# Patient Record
Sex: FEMALE | Race: WHITE | Employment: UNEMPLOYED
[De-identification: names, ages, dates, MRNs, and addresses within clinical notes are randomized per-mention and may not be internally consistent; named-entity substitution may affect disease eponyms.]

---

## 2017-01-20 RX ORDER — FLUCONAZOLE 100 MG/1
100 TABLET ORAL DAILY
Qty: 7 TABLET | Refills: 0 | Status: SHIPPED | OUTPATIENT
Start: 2017-01-20 | End: 2017-01-27

## 2017-01-20 RX ORDER — ESTRADIOL 1 MG/1
TABLET ORAL
Qty: 28 TABLET | Refills: 5 | Status: SHIPPED | OUTPATIENT
Start: 2017-01-20 | End: 2017-06-28 | Stop reason: SDUPTHER

## 2017-01-20 RX ORDER — METRONIDAZOLE 500 MG/1
500 TABLET ORAL 2 TIMES DAILY
Qty: 14 TABLET | Refills: 0 | Status: SHIPPED | OUTPATIENT
Start: 2017-01-20 | End: 2017-01-27

## 2017-02-02 ENCOUNTER — TELEPHONE (OUTPATIENT)
Dept: OBGYN | Facility: CLINIC | Age: 52
End: 2017-02-02

## 2017-02-15 RX ORDER — FLUCONAZOLE 100 MG/1
100 TABLET ORAL DAILY
Qty: 7 TABLET | Refills: 0 | Status: SHIPPED | OUTPATIENT
Start: 2017-02-15 | End: 2017-02-22

## 2017-02-15 RX ORDER — VALACYCLOVIR HYDROCHLORIDE 500 MG/1
500 TABLET, FILM COATED ORAL DAILY
Qty: 30 TABLET | Refills: 0 | Status: SHIPPED | OUTPATIENT
Start: 2017-02-15 | End: 2017-05-19 | Stop reason: SDUPTHER

## 2017-02-15 RX ORDER — METRONIDAZOLE 500 MG/1
500 TABLET ORAL 3 TIMES DAILY
Qty: 30 TABLET | Refills: 0 | Status: SHIPPED | OUTPATIENT
Start: 2017-02-15 | End: 2017-02-25

## 2017-05-05 RX ORDER — FLUCONAZOLE 100 MG/1
100 TABLET ORAL DAILY
Qty: 7 TABLET | Refills: 0 | Status: SHIPPED | OUTPATIENT
Start: 2017-05-05 | End: 2017-05-12

## 2017-05-05 RX ORDER — METRONIDAZOLE 500 MG/1
500 TABLET ORAL 2 TIMES DAILY
Qty: 14 TABLET | Refills: 0 | Status: SHIPPED | OUTPATIENT
Start: 2017-05-05 | End: 2017-05-12

## 2017-05-19 RX ORDER — FLUCONAZOLE 100 MG/1
100 TABLET ORAL DAILY
Qty: 7 TABLET | Refills: 0 | Status: SHIPPED | OUTPATIENT
Start: 2017-05-19 | End: 2017-05-26

## 2017-05-19 RX ORDER — VALACYCLOVIR HYDROCHLORIDE 500 MG/1
500 TABLET, FILM COATED ORAL DAILY
Qty: 30 TABLET | Refills: 0 | Status: SHIPPED | OUTPATIENT
Start: 2017-05-19 | End: 2017-08-24 | Stop reason: SDUPTHER

## 2017-06-07 ENCOUNTER — TELEPHONE (OUTPATIENT)
Dept: OBGYN CLINIC | Age: 52
End: 2017-06-07

## 2017-06-08 RX ORDER — FLUCONAZOLE 100 MG/1
100 TABLET ORAL DAILY
Qty: 7 TABLET | Refills: 0 | Status: SHIPPED | OUTPATIENT
Start: 2017-06-08 | End: 2017-06-15

## 2017-06-08 RX ORDER — METRONIDAZOLE 500 MG/1
500 TABLET ORAL 2 TIMES DAILY
Qty: 14 TABLET | Refills: 0 | Status: SHIPPED | OUTPATIENT
Start: 2017-06-08 | End: 2017-06-15

## 2017-06-09 ENCOUNTER — HOSPITAL ENCOUNTER (OUTPATIENT)
Age: 52
Setting detail: SPECIMEN
Discharge: HOME | End: 2017-06-09
Payer: COMMERCIAL

## 2017-06-09 ENCOUNTER — OFFICE VISIT (OUTPATIENT)
Dept: OBGYN CLINIC | Age: 52
End: 2017-06-09
Payer: COMMERCIAL

## 2017-06-09 VITALS
HEART RATE: 68 BPM | HEIGHT: 62 IN | SYSTOLIC BLOOD PRESSURE: 121 MMHG | RESPIRATION RATE: 18 BRPM | DIASTOLIC BLOOD PRESSURE: 73 MMHG | WEIGHT: 103 LBS | BODY MASS INDEX: 18.95 KG/M2

## 2017-06-09 DIAGNOSIS — N76.0 ACUTE VAGINITIS: ICD-10-CM

## 2017-06-09 DIAGNOSIS — Z12.31 VISIT FOR SCREENING MAMMOGRAM: ICD-10-CM

## 2017-06-09 DIAGNOSIS — Z01.419 WELL WOMAN EXAM: Primary | ICD-10-CM

## 2017-06-09 DIAGNOSIS — R35.0 FREQUENCY OF URINATION: ICD-10-CM

## 2017-06-09 DIAGNOSIS — Z12.4 PAP SMEAR FOR CERVICAL CANCER SCREENING: ICD-10-CM

## 2017-06-09 LAB
BILIRUBIN, POC: NEGATIVE
BLOOD URINE, POC: NEGATIVE
CLARITY, POC: CLEAR
COLOR, POC: ABNORMAL
GLUCOSE URINE, POC: NEGATIVE
KETONES, POC: NEGATIVE
LEUKOCYTE EST, POC: ABNORMAL
NITRITE, POC: NEGATIVE
PH, POC: 7
PROTEIN, POC: NEGATIVE
SPECIFIC GRAVITY, POC: 1
UROBILINOGEN, POC: 1

## 2017-06-09 PROCEDURE — G0101 CA SCREEN;PELVIC/BREAST EXAM: HCPCS | Performed by: SPECIALIST

## 2017-06-09 PROCEDURE — 81002 URINALYSIS NONAUTO W/O SCOPE: CPT | Performed by: SPECIALIST

## 2017-06-09 RX ORDER — PAROXETINE 30 MG/1
TABLET, FILM COATED ORAL
Refills: 0 | COMMUNITY
Start: 2017-05-18 | End: 2017-09-07

## 2017-06-09 RX ORDER — METRONIDAZOLE 500 MG/1
500 TABLET ORAL 2 TIMES DAILY
Qty: 14 TABLET | Refills: 0 | Status: SHIPPED | OUTPATIENT
Start: 2017-06-09 | End: 2017-06-16

## 2017-06-09 RX ORDER — FLUCONAZOLE 100 MG/1
100 TABLET ORAL DAILY
Qty: 7 TABLET | Refills: 0 | Status: SHIPPED | OUTPATIENT
Start: 2017-06-09 | End: 2017-06-16

## 2017-06-09 ASSESSMENT — ENCOUNTER SYMPTOMS
NAUSEA: 0
CONSTIPATION: 0
ABDOMINAL PAIN: 0
APNEA: 0
COUGH: 0
ABDOMINAL DISTENTION: 0
EYE PAIN: 0
VOMITING: 0
DIARRHEA: 0

## 2017-06-13 LAB — CYTOLOGY REPORT: NORMAL

## 2017-06-28 RX ORDER — ESTRADIOL 1 MG/1
TABLET ORAL
Qty: 28 TABLET | Refills: 5 | Status: SHIPPED | OUTPATIENT
Start: 2017-06-28 | End: 2017-12-13 | Stop reason: SDUPTHER

## 2017-07-11 ENCOUNTER — TELEPHONE (OUTPATIENT)
Dept: OBGYN CLINIC | Age: 52
End: 2017-07-11

## 2017-07-11 RX ORDER — METRONIDAZOLE 500 MG/1
500 TABLET ORAL 2 TIMES DAILY
Qty: 14 TABLET | Refills: 0 | Status: SHIPPED | OUTPATIENT
Start: 2017-07-11 | End: 2017-07-18

## 2017-07-11 RX ORDER — FLUCONAZOLE 100 MG/1
100 TABLET ORAL DAILY
Qty: 7 TABLET | Refills: 0 | Status: SHIPPED | OUTPATIENT
Start: 2017-07-11 | End: 2017-07-18

## 2017-08-14 RX ORDER — FLUCONAZOLE 100 MG/1
100 TABLET ORAL DAILY
Qty: 7 TABLET | Refills: 0 | Status: SHIPPED | OUTPATIENT
Start: 2017-08-14 | End: 2017-08-21

## 2017-08-14 RX ORDER — METRONIDAZOLE 500 MG/1
500 TABLET ORAL 2 TIMES DAILY
Qty: 14 TABLET | Refills: 0 | Status: SHIPPED | OUTPATIENT
Start: 2017-08-14 | End: 2017-08-21

## 2017-08-24 RX ORDER — VALACYCLOVIR HYDROCHLORIDE 500 MG/1
500 TABLET, FILM COATED ORAL DAILY
Qty: 30 TABLET | Refills: 0 | Status: SHIPPED | OUTPATIENT
Start: 2017-08-24 | End: 2017-09-07 | Stop reason: ALTCHOICE

## 2017-09-01 ENCOUNTER — APPOINTMENT (OUTPATIENT)
Dept: GENERAL RADIOLOGY | Age: 52
End: 2017-09-01
Payer: COMMERCIAL

## 2017-09-01 ENCOUNTER — HOSPITAL ENCOUNTER (EMERGENCY)
Age: 52
Discharge: HOME | End: 2017-09-01
Attending: EMERGENCY MEDICINE
Payer: COMMERCIAL

## 2017-09-01 VITALS
DIASTOLIC BLOOD PRESSURE: 86 MMHG | HEIGHT: 62 IN | OXYGEN SATURATION: 97 % | TEMPERATURE: 97.7 F | WEIGHT: 102 LBS | HEART RATE: 80 BPM | SYSTOLIC BLOOD PRESSURE: 124 MMHG | BODY MASS INDEX: 18.77 KG/M2 | RESPIRATION RATE: 16 BRPM

## 2017-09-01 DIAGNOSIS — M54.41 ACUTE RIGHT-SIDED LOW BACK PAIN WITH RIGHT-SIDED SCIATICA: Primary | ICD-10-CM

## 2017-09-01 LAB
-: ABNORMAL
AMORPHOUS: ABNORMAL
BACTERIA: ABNORMAL
BILIRUBIN URINE: NEGATIVE
CASTS UA: ABNORMAL /LPF
COLOR: YELLOW
COMMENT UA: ABNORMAL
CRYSTALS, UA: ABNORMAL /HPF
EPITHELIAL CELLS UA: ABNORMAL /HPF
GLUCOSE URINE: NEGATIVE
KETONES, URINE: NEGATIVE
LEUKOCYTE ESTERASE, URINE: NEGATIVE
MUCUS: ABNORMAL
NITRITE, URINE: NEGATIVE
OTHER OBSERVATIONS UA: ABNORMAL
PH UA: 8.5 (ref 5–8)
PROTEIN UA: NEGATIVE
RBC UA: ABNORMAL /HPF
RENAL EPITHELIAL, UA: ABNORMAL /HPF
SPECIFIC GRAVITY UA: 1.01 (ref 1–1.03)
TRICHOMONAS: ABNORMAL
TURBIDITY: ABNORMAL
URINE HGB: NEGATIVE
UROBILINOGEN, URINE: NORMAL
WBC UA: ABNORMAL /HPF
YEAST: ABNORMAL

## 2017-09-01 PROCEDURE — 72100 X-RAY EXAM L-S SPINE 2/3 VWS: CPT

## 2017-09-01 PROCEDURE — 81001 URINALYSIS AUTO W/SCOPE: CPT

## 2017-09-01 PROCEDURE — 99284 EMERGENCY DEPT VISIT MOD MDM: CPT

## 2017-09-01 PROCEDURE — 6370000000 HC RX 637 (ALT 250 FOR IP): Performed by: NURSE PRACTITIONER

## 2017-09-01 RX ORDER — OXYCODONE HYDROCHLORIDE AND ACETAMINOPHEN 5; 325 MG/1; MG/1
1 TABLET ORAL ONCE
Status: DISCONTINUED | OUTPATIENT
Start: 2017-09-01 | End: 2017-09-01

## 2017-09-01 RX ORDER — CYCLOBENZAPRINE HCL 10 MG
10 TABLET ORAL ONCE
Status: COMPLETED | OUTPATIENT
Start: 2017-09-01 | End: 2017-09-01

## 2017-09-01 RX ORDER — ACETAMINOPHEN AND CODEINE PHOSPHATE 300; 30 MG/1; MG/1
1 TABLET ORAL ONCE
Status: COMPLETED | OUTPATIENT
Start: 2017-09-01 | End: 2017-09-01

## 2017-09-01 RX ORDER — CYCLOBENZAPRINE HCL 10 MG
10 TABLET ORAL 3 TIMES DAILY PRN
Qty: 15 TABLET | Refills: 0 | Status: SHIPPED | OUTPATIENT
Start: 2017-09-01 | End: 2019-05-31 | Stop reason: ALTCHOICE

## 2017-09-01 RX ADMIN — CYCLOBENZAPRINE HYDROCHLORIDE 10 MG: 10 TABLET, FILM COATED ORAL at 15:12

## 2017-09-01 RX ADMIN — ACETAMINOPHEN AND CODEINE PHOSPHATE 1 TABLET: 300; 30 TABLET ORAL at 15:11

## 2017-09-01 ASSESSMENT — ENCOUNTER SYMPTOMS
SHORTNESS OF BREATH: 0
COUGH: 0
BOWEL INCONTINENCE: 0
VOMITING: 0
NAUSEA: 0
BACK PAIN: 1
ABDOMINAL PAIN: 0
TROUBLE SWALLOWING: 0

## 2017-09-01 ASSESSMENT — PAIN DESCRIPTION - LOCATION: LOCATION: BACK

## 2017-09-01 ASSESSMENT — PAIN DESCRIPTION - PAIN TYPE: TYPE: ACUTE PAIN

## 2017-09-01 ASSESSMENT — PAIN SCALES - GENERAL: PAINLEVEL_OUTOF10: 10

## 2017-09-07 ENCOUNTER — APPOINTMENT (OUTPATIENT)
Dept: GENERAL RADIOLOGY | Age: 52
End: 2017-09-07
Payer: COMMERCIAL

## 2017-09-07 ENCOUNTER — HOSPITAL ENCOUNTER (EMERGENCY)
Age: 52
Discharge: ANOTHER HOSPITAL | End: 2017-09-07
Attending: EMERGENCY MEDICINE
Payer: COMMERCIAL

## 2017-09-07 ENCOUNTER — APPOINTMENT (OUTPATIENT)
Dept: CT IMAGING | Age: 52
End: 2017-09-07
Payer: COMMERCIAL

## 2017-09-07 VITALS
TEMPERATURE: 98 F | HEIGHT: 62 IN | BODY MASS INDEX: 18.95 KG/M2 | RESPIRATION RATE: 16 BRPM | HEART RATE: 77 BPM | WEIGHT: 103 LBS | OXYGEN SATURATION: 99 % | DIASTOLIC BLOOD PRESSURE: 67 MMHG | SYSTOLIC BLOOD PRESSURE: 97 MMHG

## 2017-09-07 DIAGNOSIS — R29.898 BILATERAL LEG WEAKNESS: Primary | ICD-10-CM

## 2017-09-07 DIAGNOSIS — R47.1 DYSARTHRIA: ICD-10-CM

## 2017-09-07 LAB
% CKMB: 1.2 % (ref 0–3)
ABSOLUTE EOS #: 0.4 K/UL (ref 0–0.4)
ABSOLUTE LYMPH #: 1 K/UL (ref 1–4.8)
ABSOLUTE MONO #: 0.2 K/UL (ref 0.1–1.3)
AMMONIA: 35 UMOL/L (ref 11–51)
AMPHETAMINE SCREEN URINE: NEGATIVE
ANION GAP SERPL CALCULATED.3IONS-SCNC: 9 MMOL/L (ref 9–17)
BARBITURATE SCREEN URINE: NEGATIVE
BASOPHILS # BLD: 0 %
BASOPHILS ABSOLUTE: 0 K/UL (ref 0–0.2)
BENZODIAZEPINE SCREEN, URINE: NEGATIVE
BILIRUBIN URINE: NEGATIVE
BUN BLDV-MCNC: 11 MG/DL (ref 6–20)
BUN/CREAT BLD: ABNORMAL (ref 9–20)
BUPRENORPHINE URINE: ABNORMAL
CALCIUM SERPL-MCNC: 8.6 MG/DL (ref 8.6–10.4)
CANNABINOID SCREEN URINE: NEGATIVE
CHLORIDE BLD-SCNC: 108 MMOL/L (ref 98–107)
CK MB: 1.3 NG/ML
CKMB INTERPRETATION: ABNORMAL
CO2: 26 MMOL/L (ref 20–31)
COCAINE METABOLITE, URINE: NEGATIVE
COLOR: YELLOW
COMMENT UA: NORMAL
CREAT SERPL-MCNC: 0.69 MG/DL (ref 0.5–0.9)
DIFFERENTIAL TYPE: ABNORMAL
EOSINOPHILS RELATIVE PERCENT: 8 %
GFR AFRICAN AMERICAN: >60 ML/MIN
GFR NON-AFRICAN AMERICAN: >60 ML/MIN
GFR SERPL CREATININE-BSD FRML MDRD: ABNORMAL ML/MIN/{1.73_M2}
GFR SERPL CREATININE-BSD FRML MDRD: ABNORMAL ML/MIN/{1.73_M2}
GLUCOSE BLD-MCNC: 91 MG/DL (ref 70–99)
GLUCOSE URINE: NEGATIVE
HCT VFR BLD CALC: 32.5 % (ref 36–46)
HEMOGLOBIN: 10.8 G/DL (ref 12–16)
INR BLD: 1
KETONES, URINE: NEGATIVE
LAMOTRIGINE LEVEL: 10.5 UG/ML (ref 3–15)
LEUKOCYTE ESTERASE, URINE: NEGATIVE
LYMPHOCYTES # BLD: 21 %
MCH RBC QN AUTO: 34.9 PG (ref 26–34)
MCHC RBC AUTO-ENTMCNC: 33.3 G/DL (ref 31–37)
MCV RBC AUTO: 104.7 FL (ref 80–100)
MDMA URINE: ABNORMAL
METHADONE SCREEN, URINE: NEGATIVE
METHAMPHETAMINE, URINE: ABNORMAL
MONOCYTES # BLD: 4 %
MYOGLOBIN: 34 NG/ML (ref 25–58)
NITRITE, URINE: NEGATIVE
OPIATES, URINE: POSITIVE
OXYCODONE SCREEN URINE: POSITIVE
PARTIAL THROMBOPLASTIN TIME: 28.5 SEC (ref 23–31)
PDW BLD-RTO: 14 % (ref 11.5–14.9)
PH UA: 7.5 (ref 5–8)
PHENCYCLIDINE, URINE: NEGATIVE
PLATELET # BLD: 155 K/UL (ref 150–450)
PLATELET ESTIMATE: ABNORMAL
PMV BLD AUTO: 8.2 FL (ref 6–12)
POTASSIUM SERPL-SCNC: 4 MMOL/L (ref 3.7–5.3)
PROPOXYPHENE, URINE: ABNORMAL
PROTEIN UA: NEGATIVE
PROTHROMBIN TIME: 10.3 SEC (ref 9.7–12)
RBC # BLD: 3.1 M/UL (ref 4–5.2)
RBC # BLD: ABNORMAL 10*6/UL
SEG NEUTROPHILS: 67 %
SEGMENTED NEUTROPHILS ABSOLUTE COUNT: 3.2 K/UL (ref 1.3–9.1)
SODIUM BLD-SCNC: 143 MMOL/L (ref 135–144)
SPECIFIC GRAVITY UA: 1.01 (ref 1–1.03)
TEST INFORMATION: ABNORMAL
TOTAL CK: 107 U/L (ref 26–192)
TRICYCLIC ANTIDEPRESSANTS, UR: ABNORMAL
TROPONIN INTERP: ABNORMAL
TROPONIN T: <0.03 NG/ML
TURBIDITY: CLEAR
URINE HGB: NEGATIVE
UROBILINOGEN, URINE: NORMAL
WBC # BLD: 4.8 K/UL (ref 3.5–11)
WBC # BLD: ABNORMAL 10*3/UL

## 2017-09-07 PROCEDURE — 80201 ASSAY OF TOPIRAMATE: CPT

## 2017-09-07 PROCEDURE — 70450 CT HEAD/BRAIN W/O DYE: CPT

## 2017-09-07 PROCEDURE — 84484 ASSAY OF TROPONIN QUANT: CPT

## 2017-09-07 PROCEDURE — 85025 COMPLETE CBC W/AUTO DIFF WBC: CPT

## 2017-09-07 PROCEDURE — 83874 ASSAY OF MYOGLOBIN: CPT

## 2017-09-07 PROCEDURE — 82550 ASSAY OF CK (CPK): CPT

## 2017-09-07 PROCEDURE — 80175 DRUG SCREEN QUAN LAMOTRIGINE: CPT

## 2017-09-07 PROCEDURE — 85730 THROMBOPLASTIN TIME PARTIAL: CPT

## 2017-09-07 PROCEDURE — 80048 BASIC METABOLIC PNL TOTAL CA: CPT

## 2017-09-07 PROCEDURE — 82553 CREATINE MB FRACTION: CPT

## 2017-09-07 PROCEDURE — 93005 ELECTROCARDIOGRAM TRACING: CPT

## 2017-09-07 PROCEDURE — 36415 COLL VENOUS BLD VENIPUNCTURE: CPT

## 2017-09-07 PROCEDURE — 81003 URINALYSIS AUTO W/O SCOPE: CPT

## 2017-09-07 PROCEDURE — 80307 DRUG TEST PRSMV CHEM ANLYZR: CPT

## 2017-09-07 PROCEDURE — 85610 PROTHROMBIN TIME: CPT

## 2017-09-07 PROCEDURE — 71020 XR CHEST STANDARD TWO VW: CPT

## 2017-09-07 PROCEDURE — 99285 EMERGENCY DEPT VISIT HI MDM: CPT

## 2017-09-07 PROCEDURE — 82140 ASSAY OF AMMONIA: CPT

## 2017-09-07 ASSESSMENT — PAIN DESCRIPTION - PAIN TYPE
TYPE: CHRONIC PAIN
TYPE: ACUTE PAIN

## 2017-09-07 ASSESSMENT — PAIN SCALES - GENERAL
PAINLEVEL_OUTOF10: 8
PAINLEVEL_OUTOF10: 8

## 2017-09-07 ASSESSMENT — ENCOUNTER SYMPTOMS
BACK PAIN: 0
NAUSEA: 0
DIARRHEA: 0
ABDOMINAL PAIN: 0
SHORTNESS OF BREATH: 0
EYE PAIN: 0
VOMITING: 0
SORE THROAT: 0
COUGH: 0

## 2017-09-07 ASSESSMENT — PAIN DESCRIPTION - LOCATION: LOCATION: HEAD

## 2017-09-09 LAB — TOPIRAMATE LEVEL: 4.3 UG/ML (ref 5–20)

## 2017-09-13 LAB
EKG ATRIAL RATE: 83 BPM
EKG P AXIS: 59 DEGREES
EKG P-R INTERVAL: 170 MS
EKG Q-T INTERVAL: 362 MS
EKG QRS DURATION: 94 MS
EKG QTC CALCULATION (BAZETT): 425 MS
EKG R AXIS: -23 DEGREES
EKG T AXIS: 27 DEGREES
EKG VENTRICULAR RATE: 83 BPM

## 2017-09-28 ENCOUNTER — TELEPHONE (OUTPATIENT)
Dept: OBGYN CLINIC | Age: 52
End: 2017-09-28

## 2017-09-28 DIAGNOSIS — N76.0 ACUTE VAGINITIS: Primary | ICD-10-CM

## 2017-09-28 RX ORDER — FLUCONAZOLE 100 MG/1
100 TABLET ORAL DAILY
Qty: 7 TABLET | Refills: 0 | Status: SHIPPED | OUTPATIENT
Start: 2017-09-28 | End: 2017-10-05

## 2017-09-28 RX ORDER — METRONIDAZOLE 500 MG/1
500 TABLET ORAL 2 TIMES DAILY
Qty: 14 TABLET | Refills: 0 | Status: SHIPPED | OUTPATIENT
Start: 2017-09-28 | End: 2017-10-05

## 2017-10-11 ENCOUNTER — OFFICE VISIT (OUTPATIENT)
Dept: OBGYN CLINIC | Age: 52
End: 2017-10-11
Payer: COMMERCIAL

## 2017-10-11 VITALS
SYSTOLIC BLOOD PRESSURE: 123 MMHG | DIASTOLIC BLOOD PRESSURE: 83 MMHG | BODY MASS INDEX: 20.57 KG/M2 | RESPIRATION RATE: 18 BRPM | HEART RATE: 88 BPM | HEIGHT: 62 IN | WEIGHT: 111.8 LBS

## 2017-10-11 DIAGNOSIS — N76.0 ACUTE VAGINITIS: Primary | ICD-10-CM

## 2017-10-11 PROCEDURE — 99213 OFFICE O/P EST LOW 20 MIN: CPT | Performed by: SPECIALIST

## 2017-10-11 RX ORDER — DIPHENHYDRAMINE HYDROCHLORIDE 25 MG/1
CAPSULE ORAL
Refills: 0 | COMMUNITY
Start: 2017-09-29

## 2017-10-11 RX ORDER — PSYLLIUM HUSK 3.4 G/7G
POWDER ORAL
Refills: 0 | COMMUNITY
Start: 2017-09-29

## 2017-10-11 RX ORDER — MEMANTINE HYDROCHLORIDE 10 MG/1
TABLET ORAL
Refills: 0 | COMMUNITY
Start: 2017-09-20 | End: 2019-05-31 | Stop reason: SINTOL

## 2017-10-11 RX ORDER — METRONIDAZOLE 500 MG/1
500 TABLET ORAL 2 TIMES DAILY
Qty: 14 TABLET | Refills: 0 | Status: SHIPPED | OUTPATIENT
Start: 2017-10-11 | End: 2017-10-18

## 2017-10-11 RX ORDER — FLUCONAZOLE 100 MG/1
100 TABLET ORAL DAILY
Qty: 7 TABLET | Refills: 0 | Status: SHIPPED | OUTPATIENT
Start: 2017-10-11 | End: 2017-10-18

## 2017-10-11 ASSESSMENT — ENCOUNTER SYMPTOMS
EYE PAIN: 0
NAUSEA: 0
APNEA: 0
COUGH: 0
ABDOMINAL PAIN: 1
DIARRHEA: 0
ABDOMINAL DISTENTION: 0
CONSTIPATION: 0
VOMITING: 0

## 2017-10-11 NOTE — PROGRESS NOTES
(90 BASE) MCG/ACT inhaler Inhale 2 puffs into the lungs every 4 hours as needed  0    ORENCIA 125 MG/ML SOSY Inject 1 Dose into the skin once a week Indications: every Tuesday every Tuesday  0    lamoTRIgine (LAMICTAL) 200 MG tablet Take 1 tablet by mouth 2 times daily   1    methotrexate (RHEUMATREX) 2.5 MG chemo tablet Take 12.5 mg by mouth once a week Indications: (Pt takes 5 tabs = 12.5 mg every Tuesday) (Pt takes 5 tabs = 12.5 mg every Tuesday)  0    gabapentin (NEURONTIN) 800 MG tablet Take 1 tablet by mouth three times daily.  pantoprazole (PROTONIX) 20 MG tablet Take 1 tablet by mouth daily.  oxyCODONE-acetaminophen (PERCOCET) 5-325 MG per tablet Take 1 tablet by mouth every 8 hours as needed for Pain       hydrochlorothiazide (HYDRODIURIL) 25 MG tablet Take 25 mg by mouth daily.  levothyroxine (SYNTHROID) 75 MCG tablet Take 1 tablet by mouth daily. On empty stomach 30 tablet 5    tizanidine (ZANAFLEX) 4 MG tablet Take 4 mg by mouth every 8 hours as needed.  folic acid (FOLVITE) 1 MG tablet Take 1 mg by mouth daily. No current Epic-ordered facility-administered medications on file. Problem List Items Addressed This Visit     Vaginitis - Primary      Other Visit Diagnoses    None. Allergies   Allergen Reactions    Latex     Aspirin     Cortisone     Dye [Iodides] Other (See Comments)     IV dye leaked into arm and caused local swelling in that area    Ibuprofen     Influenza Vaccines      On orencia, for RA    Penicillins      No orders of the defined types were placed in this encounter. Patient is here today complaining of a vaginal discharge with pain and itching. She states that it started a couplle of days ago. The vaginal pain kept her up all night. Patient denies having diabetes. She also complains of pain in her stomach that has been bothering her a lot. She denies nausea, vomiting and fever.          Review of Systems   Constitutional:

## 2017-11-20 ENCOUNTER — TELEPHONE (OUTPATIENT)
Dept: OBGYN CLINIC | Age: 52
End: 2017-11-20

## 2017-11-20 DIAGNOSIS — N76.0 ACUTE VAGINITIS: Primary | ICD-10-CM

## 2017-11-21 RX ORDER — METRONIDAZOLE 500 MG/1
500 TABLET ORAL 2 TIMES DAILY
Qty: 14 TABLET | Refills: 0 | Status: SHIPPED | OUTPATIENT
Start: 2017-11-21 | End: 2017-11-28

## 2017-11-21 RX ORDER — FLUCONAZOLE 100 MG/1
100 TABLET ORAL DAILY
Qty: 7 TABLET | Refills: 0 | Status: SHIPPED | OUTPATIENT
Start: 2017-11-21 | End: 2017-11-28

## 2017-12-06 RX ORDER — VALACYCLOVIR HYDROCHLORIDE 500 MG/1
500 TABLET, FILM COATED ORAL DAILY
Qty: 30 TABLET | Refills: 0 | Status: SHIPPED | OUTPATIENT
Start: 2017-12-06 | End: 2018-02-19 | Stop reason: SDUPTHER

## 2017-12-06 NOTE — TELEPHONE ENCOUNTER
LOV 10/11/17    Next Visit Date:  No future appointments.     Health Maintenance   Topic Date Due    Hepatitis C screen  1965    HIV screen  10/20/1980    DTaP/Tdap/Td vaccine (1 - Tdap) 10/20/1984    Lipid screen  10/20/2005    Colon cancer screen colonoscopy  04/26/2017    Breast cancer screen  01/07/2018    Cervical cancer screen  06/09/2020       No results found for: LABA1C          ( goal A1C is < 7)   No results found for: LABMICR  No results found for: LDLCHOLESTEROL, LDLCALC    (goal LDL is <100)   AST (U/L)   Date Value   09/19/2016 21     ALT (U/L)   Date Value   09/19/2016 14     BUN (mg/dL)   Date Value   09/07/2017 11     BP Readings from Last 3 Encounters:   10/11/17 123/83   09/07/17 97/67   09/01/17 124/86          (goal 120/80)    All Future Testing planned in [facility]  Lab Frequency Next Occurrence               Patient Active Problem List:     Vaginitis     Rectal bleeding     IBS (irritable bowel syndrome)     Tubulovillous adenoma     Internal hemorrhoids     Weight loss     Seizures (HCC)     Macrocytic anemia     Acquired hypothyroidism     Vertigo     Rheumatoid arthritis involving multiple sites ([facility] [address]

## 2017-12-13 RX ORDER — ESTRADIOL 1 MG/1
TABLET ORAL
Qty: 28 TABLET | Refills: 5 | Status: SHIPPED | OUTPATIENT
Start: 2017-12-13 | End: 2018-05-18 | Stop reason: SDUPTHER

## 2018-01-04 ENCOUNTER — TELEPHONE (OUTPATIENT)
Dept: OBGYN CLINIC | Age: 53
End: 2018-01-04

## 2018-01-10 RX ORDER — FLUCONAZOLE 100 MG/1
100 TABLET ORAL DAILY
Qty: 7 TABLET | Refills: 0 | Status: SHIPPED | OUTPATIENT
Start: 2018-01-10 | End: 2018-01-17

## 2018-01-10 RX ORDER — METRONIDAZOLE 500 MG/1
500 TABLET ORAL 2 TIMES DAILY
Qty: 14 TABLET | Refills: 0 | Status: SHIPPED | OUTPATIENT
Start: 2018-01-10 | End: 2018-01-17

## 2018-02-19 ENCOUNTER — OFFICE VISIT (OUTPATIENT)
Dept: OBGYN CLINIC | Age: 53
End: 2018-02-19
Payer: COMMERCIAL

## 2018-02-19 ENCOUNTER — HOSPITAL ENCOUNTER (OUTPATIENT)
Age: 53
Setting detail: SPECIMEN
Discharge: HOME | End: 2018-02-19
Payer: COMMERCIAL

## 2018-02-19 VITALS
HEART RATE: 86 BPM | BODY MASS INDEX: 21.03 KG/M2 | RESPIRATION RATE: 20 BRPM | WEIGHT: 115 LBS | SYSTOLIC BLOOD PRESSURE: 111 MMHG | DIASTOLIC BLOOD PRESSURE: 76 MMHG

## 2018-02-19 DIAGNOSIS — R30.0 DYSURIA: ICD-10-CM

## 2018-02-19 DIAGNOSIS — N89.8 VAGINAL ODOR: ICD-10-CM

## 2018-02-19 DIAGNOSIS — R35.0 URINARY FREQUENCY: Primary | ICD-10-CM

## 2018-02-19 DIAGNOSIS — A60.00 GENITAL HERPES SIMPLEX, UNSPECIFIED SITE: ICD-10-CM

## 2018-02-19 LAB
BILIRUBIN, POC: NORMAL
BLOOD URINE, POC: NORMAL
CLARITY, POC: CLEAR
COLOR, POC: NORMAL
DIRECT EXAM: NORMAL
GLUCOSE URINE, POC: NORMAL
KETONES, POC: NORMAL
LEUKOCYTE EST, POC: NORMAL
Lab: NORMAL
NITRITE, POC: NORMAL
PH, POC: NORMAL
PROTEIN, POC: NORMAL
SPECIFIC GRAVITY, POC: NORMAL
SPECIMEN DESCRIPTION: NORMAL
STATUS: NORMAL
UROBILINOGEN, POC: NORMAL

## 2018-02-19 PROCEDURE — G8484 FLU IMMUNIZE NO ADMIN: HCPCS | Performed by: CLINICAL NURSE SPECIALIST

## 2018-02-19 PROCEDURE — 1036F TOBACCO NON-USER: CPT | Performed by: CLINICAL NURSE SPECIALIST

## 2018-02-19 PROCEDURE — G8428 CUR MEDS NOT DOCUMENT: HCPCS | Performed by: CLINICAL NURSE SPECIALIST

## 2018-02-19 PROCEDURE — 81002 URINALYSIS NONAUTO W/O SCOPE: CPT | Performed by: CLINICAL NURSE SPECIALIST

## 2018-02-19 PROCEDURE — 99213 OFFICE O/P EST LOW 20 MIN: CPT | Performed by: CLINICAL NURSE SPECIALIST

## 2018-02-19 PROCEDURE — G8420 CALC BMI NORM PARAMETERS: HCPCS | Performed by: CLINICAL NURSE SPECIALIST

## 2018-02-19 PROCEDURE — 3017F COLORECTAL CA SCREEN DOC REV: CPT | Performed by: CLINICAL NURSE SPECIALIST

## 2018-02-19 PROCEDURE — 3014F SCREEN MAMMO DOC REV: CPT | Performed by: CLINICAL NURSE SPECIALIST

## 2018-02-19 RX ORDER — VALACYCLOVIR HYDROCHLORIDE 500 MG/1
500 TABLET, FILM COATED ORAL DAILY
Qty: 30 TABLET | Refills: 2 | Status: SHIPPED | OUTPATIENT
Start: 2018-02-19 | End: 2018-10-10 | Stop reason: SDUPTHER

## 2018-02-19 ASSESSMENT — ENCOUNTER SYMPTOMS
RESPIRATORY NEGATIVE: 1
EYES NEGATIVE: 1
GASTROINTESTINAL NEGATIVE: 1
ALLERGIC/IMMUNOLOGIC NEGATIVE: 1

## 2018-02-19 NOTE — PROGRESS NOTES
Subjective:      Patient ID:  Ana Bhatia is a 46 y.o. female who presents for   Chief Complaint   Patient presents with    Urinary Frequency     Patient is here today for urinary frequency and vaginal odor. She states that she thinks she has a UTI. This has been going on for awhile. HPI     Patient is a 45 yo female who presents for urinary frequency and burning , vaginal pressure and odor which has been ongoing for 1 week. Patient states \"it does not feel right down there. \"    Review of Systems   Constitutional: Negative. HENT: Negative. Eyes: Negative. Respiratory: Negative. Cardiovascular: Negative. Gastrointestinal: Negative. Endocrine: Negative. Genitourinary: Positive for dysuria (for approx. 1 week) and frequency (for approx. 1 week). Vaginal pressure and odor for approx. 1 week   Musculoskeletal: Negative. Skin: Negative. Allergic/Immunologic: Negative. Neurological: Negative. Hematological: Negative. Psychiatric/Behavioral: Negative. /76 (Site: Left Arm, Position: Sitting, Cuff Size: Small Adult)   Pulse 86   Resp 20   Wt 115 lb (52.2 kg)   LMP 08/05/2007 (Within Years)   BMI 21.03 kg/m²    Patient's last menstrual period was 08/05/2007 (within years). Objective:   Physical Exam   Constitutional: She is oriented to person, place, and time. She appears well-developed and well-nourished. HENT:   Head: Normocephalic and atraumatic. Eyes: Conjunctivae are normal.   Neck: Normal range of motion. Neck supple. Cardiovascular: Normal rate and regular rhythm. Pulmonary/Chest: Effort normal and breath sounds normal.   Abdominal: Bowel sounds are normal.   Genitourinary: Vaginal discharge (white discharge) found. Genitourinary Comments: Small spec used and patient is very painful with insertion could not open spec very much due to patient comfort   Musculoskeletal: Normal range of motion.    Neurological: She is oriented to person, place, and time. Skin: Skin is warm and dry. Psychiatric: She has a normal mood and affect. Her behavior is normal. Judgment and thought content normal.   Vitals reviewed. Assessment:     1. Urinary frequency  POCT Urinalysis Dipstick (No Micro)   2. Vaginal odor  VAGINITIS DNA PROBE   3. Genital herpes simplex, unspecified site  valACYclovir (VALTREX) 500 MG tablet   4. Dysuria   VAGINITIS DNA PROBE         Plan:      Return for as needed. Patient was seen with total face to face time of 15 minutes. More than 50% of this visit was on counseling and education regarding the problems listed below and her options. She was also counseled on her preventative health maintenance recommendations and follow-up.     Electronically signed by: Graciela Mandel CNP

## 2018-04-12 ENCOUNTER — OFFICE VISIT (OUTPATIENT)
Dept: OBGYN CLINIC | Age: 53
End: 2018-04-12
Payer: COMMERCIAL

## 2018-04-12 ENCOUNTER — HOSPITAL ENCOUNTER (OUTPATIENT)
Age: 53
Setting detail: SPECIMEN
Discharge: HOME | End: 2018-04-12
Payer: COMMERCIAL

## 2018-04-12 VITALS
HEART RATE: 77 BPM | WEIGHT: 112 LBS | RESPIRATION RATE: 16 BRPM | SYSTOLIC BLOOD PRESSURE: 109 MMHG | BODY MASS INDEX: 20.49 KG/M2 | DIASTOLIC BLOOD PRESSURE: 66 MMHG

## 2018-04-12 DIAGNOSIS — R10.2 PELVIC PAIN IN FEMALE: ICD-10-CM

## 2018-04-12 DIAGNOSIS — N89.8 VAGINAL ODOR: Primary | ICD-10-CM

## 2018-04-12 DIAGNOSIS — N89.8 VAGINA ITCHING: ICD-10-CM

## 2018-04-12 DIAGNOSIS — R35.0 URINARY FREQUENCY: ICD-10-CM

## 2018-04-12 LAB
BILIRUBIN, POC: NORMAL
BLOOD URINE, POC: NORMAL
CLARITY, POC: NORMAL
COLOR, POC: NORMAL
DIRECT EXAM: ABNORMAL
GLUCOSE URINE, POC: NORMAL
KETONES, POC: NORMAL
LEUKOCYTE EST, POC: NORMAL
Lab: ABNORMAL
NITRITE, POC: NORMAL
PH, POC: NORMAL
PROTEIN, POC: NORMAL
SPECIFIC GRAVITY, POC: NORMAL
SPECIMEN DESCRIPTION: ABNORMAL
STATUS: ABNORMAL
UROBILINOGEN, POC: NORMAL

## 2018-04-12 PROCEDURE — 3014F SCREEN MAMMO DOC REV: CPT | Performed by: CLINICAL NURSE SPECIALIST

## 2018-04-12 PROCEDURE — G8427 DOCREV CUR MEDS BY ELIG CLIN: HCPCS | Performed by: CLINICAL NURSE SPECIALIST

## 2018-04-12 PROCEDURE — 99213 OFFICE O/P EST LOW 20 MIN: CPT | Performed by: CLINICAL NURSE SPECIALIST

## 2018-04-12 PROCEDURE — 81002 URINALYSIS NONAUTO W/O SCOPE: CPT | Performed by: CLINICAL NURSE SPECIALIST

## 2018-04-12 PROCEDURE — 3017F COLORECTAL CA SCREEN DOC REV: CPT | Performed by: CLINICAL NURSE SPECIALIST

## 2018-04-12 PROCEDURE — G8420 CALC BMI NORM PARAMETERS: HCPCS | Performed by: CLINICAL NURSE SPECIALIST

## 2018-04-12 PROCEDURE — 1036F TOBACCO NON-USER: CPT | Performed by: CLINICAL NURSE SPECIALIST

## 2018-04-12 RX ORDER — NITROFURANTOIN 25; 75 MG/1; MG/1
100 CAPSULE ORAL 2 TIMES DAILY
Qty: 14 CAPSULE | Refills: 0 | Status: SHIPPED | OUTPATIENT
Start: 2018-04-12 | End: 2018-04-19

## 2018-04-12 RX ORDER — FLUCONAZOLE 150 MG/1
TABLET ORAL
Qty: 2 TABLET | Refills: 0 | Status: SHIPPED | OUTPATIENT
Start: 2018-04-12 | End: 2018-08-20 | Stop reason: SDUPTHER

## 2018-04-12 RX ORDER — NYSTATIN 100000 U/G
CREAM TOPICAL
Qty: 1 TUBE | Refills: 1 | Status: SHIPPED | OUTPATIENT
Start: 2018-04-12 | End: 2019-05-31 | Stop reason: ALTCHOICE

## 2018-04-12 ASSESSMENT — ENCOUNTER SYMPTOMS
RESPIRATORY NEGATIVE: 1
GASTROINTESTINAL NEGATIVE: 1
ALLERGIC/IMMUNOLOGIC NEGATIVE: 1
EYES NEGATIVE: 1

## 2018-05-18 RX ORDER — ESTRADIOL 1 MG/1
TABLET ORAL
Qty: 28 TABLET | Refills: 5 | Status: SHIPPED | OUTPATIENT
Start: 2018-05-18 | End: 2018-10-15 | Stop reason: SDUPTHER

## 2018-05-25 ENCOUNTER — TELEPHONE (OUTPATIENT)
Dept: OBGYN CLINIC | Age: 53
End: 2018-05-25

## 2018-06-01 ENCOUNTER — OFFICE VISIT (OUTPATIENT)
Dept: OBGYN CLINIC | Age: 53
End: 2018-06-01
Payer: COMMERCIAL

## 2018-06-01 VITALS
WEIGHT: 112.4 LBS | HEIGHT: 61 IN | HEART RATE: 78 BPM | DIASTOLIC BLOOD PRESSURE: 69 MMHG | SYSTOLIC BLOOD PRESSURE: 113 MMHG | BODY MASS INDEX: 21.22 KG/M2

## 2018-06-01 DIAGNOSIS — N95.2 ATROPHIC VAGINITIS: Primary | ICD-10-CM

## 2018-06-01 DIAGNOSIS — N76.0 ACUTE VAGINITIS: ICD-10-CM

## 2018-06-01 DIAGNOSIS — R35.0 FREQUENT URINATION: ICD-10-CM

## 2018-06-01 LAB
BILIRUBIN, POC: NEGATIVE
BLOOD URINE, POC: ABNORMAL
CLARITY, POC: CLEAR
COLOR, POC: YELLOW
GLUCOSE URINE, POC: NEGATIVE
KETONES, POC: ABNORMAL
LEUKOCYTE EST, POC: NEGATIVE
NITRITE, POC: NEGATIVE
PH, POC: 5
PROTEIN, POC: NEGATIVE
SPECIFIC GRAVITY, POC: 1.01
UROBILINOGEN, POC: NORMAL

## 2018-06-01 PROCEDURE — G8427 DOCREV CUR MEDS BY ELIG CLIN: HCPCS | Performed by: SPECIALIST

## 2018-06-01 PROCEDURE — 1036F TOBACCO NON-USER: CPT | Performed by: SPECIALIST

## 2018-06-01 PROCEDURE — 3017F COLORECTAL CA SCREEN DOC REV: CPT | Performed by: SPECIALIST

## 2018-06-01 PROCEDURE — 81002 URINALYSIS NONAUTO W/O SCOPE: CPT | Performed by: SPECIALIST

## 2018-06-01 PROCEDURE — 99213 OFFICE O/P EST LOW 20 MIN: CPT | Performed by: SPECIALIST

## 2018-06-01 PROCEDURE — G8420 CALC BMI NORM PARAMETERS: HCPCS | Performed by: SPECIALIST

## 2018-06-01 RX ORDER — VALACYCLOVIR HYDROCHLORIDE 500 MG/1
500 TABLET, FILM COATED ORAL 2 TIMES DAILY
Qty: 30 TABLET | Refills: 1 | Status: SHIPPED | OUTPATIENT
Start: 2018-06-01 | End: 2019-01-14 | Stop reason: SDUPTHER

## 2018-06-01 RX ORDER — FLUCONAZOLE 100 MG/1
100 TABLET ORAL DAILY
Qty: 7 TABLET | Refills: 0 | Status: SHIPPED | OUTPATIENT
Start: 2018-06-01 | End: 2018-06-08

## 2018-06-01 RX ORDER — ESTRADIOL 0.1 MG/G
1 CREAM VAGINAL DAILY
Qty: 1 TUBE | Refills: 3 | Status: SHIPPED | OUTPATIENT
Start: 2018-06-01 | End: 2022-06-02

## 2018-06-01 RX ORDER — METRONIDAZOLE 500 MG/1
500 TABLET ORAL 2 TIMES DAILY
Qty: 14 TABLET | Refills: 0 | Status: SHIPPED | OUTPATIENT
Start: 2018-06-01 | End: 2018-06-08

## 2018-06-01 ASSESSMENT — ENCOUNTER SYMPTOMS
VOMITING: 0
ABDOMINAL DISTENTION: 0
APNEA: 0
EYE PAIN: 0
COUGH: 0
NAUSEA: 0
ABDOMINAL PAIN: 0
DIARRHEA: 0
CONSTIPATION: 0

## 2018-07-03 ENCOUNTER — OFFICE VISIT (OUTPATIENT)
Dept: OBGYN CLINIC | Age: 53
End: 2018-07-03
Payer: COMMERCIAL

## 2018-07-03 VITALS
WEIGHT: 104.2 LBS | HEART RATE: 83 BPM | DIASTOLIC BLOOD PRESSURE: 78 MMHG | SYSTOLIC BLOOD PRESSURE: 113 MMHG | HEIGHT: 61 IN | BODY MASS INDEX: 19.67 KG/M2

## 2018-07-03 DIAGNOSIS — R35.0 FREQUENT URINATION: ICD-10-CM

## 2018-07-03 DIAGNOSIS — N76.0 ACUTE VAGINITIS: ICD-10-CM

## 2018-07-03 DIAGNOSIS — N39.0 URINARY TRACT INFECTION WITHOUT HEMATURIA, SITE UNSPECIFIED: ICD-10-CM

## 2018-07-03 DIAGNOSIS — N89.8 VAGINAL DISCHARGE: ICD-10-CM

## 2018-07-03 DIAGNOSIS — N95.2 ATROPHIC VAGINITIS: Primary | ICD-10-CM

## 2018-07-03 PROCEDURE — G8420 CALC BMI NORM PARAMETERS: HCPCS | Performed by: SPECIALIST

## 2018-07-03 PROCEDURE — 99213 OFFICE O/P EST LOW 20 MIN: CPT | Performed by: SPECIALIST

## 2018-07-03 PROCEDURE — 1036F TOBACCO NON-USER: CPT | Performed by: SPECIALIST

## 2018-07-03 PROCEDURE — G8427 DOCREV CUR MEDS BY ELIG CLIN: HCPCS | Performed by: SPECIALIST

## 2018-07-03 PROCEDURE — 3017F COLORECTAL CA SCREEN DOC REV: CPT | Performed by: SPECIALIST

## 2018-07-03 RX ORDER — FLUCONAZOLE 100 MG/1
100 TABLET ORAL DAILY
Qty: 7 TABLET | Refills: 0 | Status: SHIPPED | OUTPATIENT
Start: 2018-07-03 | End: 2018-07-10

## 2018-07-03 RX ORDER — ESTRADIOL 0.1 MG/G
1 CREAM VAGINAL SEE ADMIN INSTRUCTIONS
Qty: 1 TUBE | Refills: 3 | Status: SHIPPED | OUTPATIENT
Start: 2018-07-03 | End: 2020-12-03 | Stop reason: SDUPTHER

## 2018-07-03 RX ORDER — NITROFURANTOIN 25; 75 MG/1; MG/1
100 CAPSULE ORAL 2 TIMES DAILY
Qty: 20 CAPSULE | Refills: 0 | Status: SHIPPED | OUTPATIENT
Start: 2018-07-03 | End: 2018-07-13

## 2018-07-03 RX ORDER — METRONIDAZOLE 500 MG/1
500 TABLET ORAL 2 TIMES DAILY
Qty: 14 TABLET | Refills: 0 | Status: SHIPPED | OUTPATIENT
Start: 2018-07-03 | End: 2018-07-10

## 2018-07-03 ASSESSMENT — ENCOUNTER SYMPTOMS
APNEA: 0
VOMITING: 0
EYE PAIN: 0
DIARRHEA: 0
ABDOMINAL PAIN: 0
NAUSEA: 0
ABDOMINAL DISTENTION: 0
CONSTIPATION: 0
COUGH: 0

## 2018-07-03 NOTE — PROGRESS NOTES
Subjective:      Patient ID: Teri Ho is a 46 y.o. female. Chief Complaint   Patient presents with    Urinary Frequency     Patient is here today for frequent urination. Patient complains of vaginal itching, odor, white discharge, frequent urination, pelvic pain.  Vaginal Odor    Vaginal Itching    Vaginal Discharge    Pelvic Pain     /78 (Site: Right Arm, Position: Sitting, Cuff Size: Large Adult)   Pulse 83   Ht 5' 1\" (1.549 m)   Wt 104 lb 3.2 oz (47.3 kg)   LMP 08/05/2007 (Within Years)   BMI 19.69 kg/m²   Patient's last menstrual period was 08/05/2007 (within years).     [id number]    Past Medical History:   Diagnosis Date    Anxiety     Asthma     Cervical pain (neck)     Depression     Epilepsy ([facility] [address]     History of herpes genitalis 4/10/2007    see lab work scanned    Hypothyroidism     Insomnia     Internal hemorrhoids     Irritable bowel syndrome     Lupus     Menarche @ 14 y/o    MVP (mitral valve prolapse)     Parity     G 4 P 3  -  3 c-sections,1 ectopic    Rheumatoid arthritis ([facility] [address]     Seizures ([facility] [address] 9/20/2016    Tubulovillous adenoma     Vitamin D deficiency      Current Outpatient Prescriptions Ordered in [facility]   Medication Sig Dispense Refill    fluconazole (DIFLUCAN) 100 MG tablet Take 1 tablet by mouth daily for 7 days 7 tablet 0    metroNIDAZOLE (FLAGYL) 500 MG tablet Take 1 tablet by mouth 2 times daily for 7 days 14 tablet 0    nitrofurantoin, macrocrystal-monohydrate, (MACROBID) 100 MG capsule Take 1 capsule by mouth 2 times daily for 10 days 20 capsule 0    estradiol (ESTRACE VAGINAL) 0.1 MG/GM vaginal cream Place 1 g vaginally See Admin Instructions I gram vaginally twice per week 1 Tube 3    valACYclovir (VALTREX) 500 MG tablet Take 1 tablet by mouth 2 times daily 30 tablet 1    estradiol (ESTRACE VAGINAL) 0.1 MG/GM vaginal cream Place 1 g vaginally daily 1 Tube 3    estradiol (ESTRACE) 1 MG tablet take 1 tablet by mouth once daily 28 tablet 5  nystatin (MYCOSTATIN) 177993 UNIT/GM cream Apply topically 2 times daily. 1 Tube 1    valACYclovir (VALTREX) 500 MG tablet Take 1 tablet by mouth daily 30 tablet 2    RA VITAMIN B-12 TR 1000 MCG TBCR   0    Pyridoxine HCl (VITAMIN B-6) 25 MG tablet   0    cyclobenzaprine (FLEXERIL) 10 MG tablet Take 1 tablet by mouth 3 times daily as needed for Muscle spasms 15 tablet 0    ranitidine (ZANTAC) 150 MG tablet   0    topiramate (TOPAMAX) 50 MG tablet Take 1 tablet by mouth 2 times daily 60 tablet 3    VENTOLIN  (90 BASE) MCG/ACT inhaler Inhale 2 puffs into the lungs every 4 hours as needed  0    ORENCIA 125 MG/ML SOSY Inject 1 Dose into the skin once a week Indications: every Tuesday every Tuesday  0    lamoTRIgine (LAMICTAL) 200 MG tablet Take 1 tablet by mouth 2 times daily   1    methotrexate (RHEUMATREX) 2.5 MG chemo tablet Take 12.5 mg by mouth once a week Indications: (Pt takes 5 tabs = 12.5 mg every Tuesday) (Pt takes 5 tabs = 12.5 mg every Tuesday)  0    gabapentin (NEURONTIN) 800 MG tablet Take 1 tablet by mouth three times daily.  pantoprazole (PROTONIX) 20 MG tablet Take 1 tablet by mouth daily.  oxyCODONE-acetaminophen (PERCOCET) 5-325 MG per tablet Take 1 tablet by mouth every 8 hours as needed for Pain       hydrochlorothiazide (HYDRODIURIL) 25 MG tablet Take 25 mg by mouth daily.  levothyroxine (SYNTHROID) 75 MCG tablet Take 1 tablet by mouth daily. On empty stomach 30 tablet 5    tizanidine (ZANAFLEX) 4 MG tablet Take 4 mg by mouth every 8 hours as needed.  folic acid (FOLVITE) 1 MG tablet Take 1 mg by mouth daily.  fluconazole (DIFLUCAN) 150 MG tablet Take one tablet today and repeat one tablet in 3 days. 2 tablet 0    memantine (NAMENDA) 10 MG tablet take 1 tablet by mouth twice a day  0    ondansetron (ZOFRAN) 4 MG tablet take 1 tablet by mouth every 8 hours  0     No current Epic-ordered facility-administered medications on file.       Problem List Items Addressed This Visit     Vaginitis      Other Visit Diagnoses     Atrophic vaginitis    -  Primary    Frequent urination        Relevant Orders    POCT Urinalysis no Micro    Vaginal discharge        Relevant Orders    VAGINITIS DNA PROBE    Urinary tract infection without hematuria, site unspecified        Relevant Medications    nitrofurantoin, macrocrystal-monohydrate, (MACROBID) 100 MG capsule        Allergies   Allergen Reactions    Latex     Aspirin     Cortisone     Dye [Iodides] Other (See Comments)     IV dye leaked into arm and caused local swelling in that area    Ibuprofen     Influenza Vaccines      On orencia, for RA    Penicillins      Orders Placed This Encounter   Procedures    VAGINITIS DNA PROBE     Standing Status:   Future     Standing Expiration Date:   9/3/2018    POCT Urinalysis no Micro        Patient is here today complaining of vaginal itching. She states that she does not think that she has much of an odor. She has been using Azo without any relief. She states that she also feels like she always has to go to the bathroom, but nothing comes out. She also states that she pees a lot and she feels like she will never make it to the bathroom. She tried to have sex last night, but it hurt and she had to stop. Review of Systems   Constitutional: Negative for activity change, appetite change and fever. HENT: Negative for ear discharge and ear pain. Eyes: Negative for pain and visual disturbance. Respiratory: Negative for apnea and cough. Cardiovascular: Negative for chest pain, palpitations and leg swelling. Gastrointestinal: Negative for abdominal distention, abdominal pain, constipation, diarrhea, nausea and vomiting. Endocrine: Negative. Genitourinary: Positive for difficulty urinating, dyspareunia and vaginal discharge. Negative for dysuria, menstrual problem and pelvic pain. Musculoskeletal: Negative for neck pain and neck stiffness. Skin: Negative. Neurological: Negative for light-headedness and numbness. Hematological: Negative. Does not bruise/bleed easily. Objective:   Physical Exam   Constitutional: She is oriented to person, place, and time. Vital signs are normal. She appears well-developed and well-nourished. HENT:   Head: Normocephalic and atraumatic. Neck: Normal range of motion. Neck supple. No thyromegaly present. Cardiovascular: Normal rate and regular rhythm. Pulmonary/Chest: Effort normal and breath sounds normal. She has no wheezes. Abdominal: Soft. Bowel sounds are normal. She exhibits no distension and no mass. There is no tenderness. There is no guarding. Genitourinary: Vaginal discharge found. Genitourinary Comments: Thin vaginal mucosa compatible with atrophic vaginitis with trauma (small laceration at the introitus) most likely as the result of attempted intercourse     Musculoskeletal: Normal range of motion. Neurological: She is alert and oriented to person, place, and time. Skin: Skin is dry. Psychiatric: She has a normal mood and affect. Her behavior is normal. Thought content normal.   Nursing note and vitals reviewed. Assessment:      Patient with atrophic vaginitis. Will treat with estrogen vaginal cream.  Patient also found to have vaginitis. Will treat with Flagyl and Diflucan. Patient with clinical UTI, unable to void today. Will treat with Macrobid. Patient advised to continue Ensure and follow up with her family physician for continued weight loss.       Plan:      Orders Placed This Encounter   Medications    fluconazole (DIFLUCAN) 100 MG tablet     Sig: Take 1 tablet by mouth daily for 7 days     Dispense:  7 tablet     Refill:  0    metroNIDAZOLE (FLAGYL) 500 MG tablet     Sig: Take 1 tablet by mouth 2 times daily for 7 days     Dispense:  14 tablet     Refill:  0    nitrofurantoin, macrocrystal-monohydrate, (MACROBID) 100 MG capsule     Sig: Take 1 capsule by mouth 2 times daily for 10 days     Dispense:  20 capsule     Refill:  0    estradiol (ESTRACE VAGINAL) 0.1 MG/GM vaginal cream     Sig: Place 1 g vaginally See Admin Instructions I gram vaginally twice per week     Dispense:  1 Tube     Refill:  3     Appointment in 3 months. Santiago Jose am scribing for, and in the presence of Dr. Lyudmila Morrison. Electronically signed by: Judd January 7/3/18 1:54 PM       I agree to the above documentation placed by my scribe Judd January. I reviewed the scribe's note and agree with the documented findings and plan of care. Any areas of disagreement are noted on the chart. I have personally evaluated this patient. Additional findings are as noted. I agree with the chief complaint, past medical history, past surgical history, allergies, medications, social and family history as documented unless otherwise noted below.      Electronically signed by Lyudmila Morrison MD on 7/4/2018 at 7:16 PM

## 2018-08-20 ENCOUNTER — TELEPHONE (OUTPATIENT)
Dept: OBGYN CLINIC | Age: 53
End: 2018-08-20

## 2018-08-20 DIAGNOSIS — N89.8 VAGINAL DISCHARGE: Primary | ICD-10-CM

## 2018-08-20 RX ORDER — METRONIDAZOLE 500 MG/1
500 TABLET ORAL 2 TIMES DAILY
Qty: 14 TABLET | Refills: 0 | Status: SHIPPED | OUTPATIENT
Start: 2018-08-20 | End: 2018-08-27

## 2018-08-20 RX ORDER — FLUCONAZOLE 100 MG/1
100 TABLET ORAL DAILY
Qty: 7 TABLET | Refills: 0 | Status: SHIPPED | OUTPATIENT
Start: 2018-08-20 | End: 2018-08-27

## 2018-10-04 ENCOUNTER — TELEPHONE (OUTPATIENT)
Dept: OBGYN CLINIC | Age: 53
End: 2018-10-04

## 2018-10-05 RX ORDER — NITROFURANTOIN 25; 75 MG/1; MG/1
100 CAPSULE ORAL 2 TIMES DAILY
Qty: 14 CAPSULE | Refills: 0 | Status: SHIPPED | OUTPATIENT
Start: 2018-10-05 | End: 2018-10-15

## 2018-10-10 ENCOUNTER — OFFICE VISIT (OUTPATIENT)
Dept: OBGYN CLINIC | Age: 53
End: 2018-10-10
Payer: COMMERCIAL

## 2018-10-10 VITALS
SYSTOLIC BLOOD PRESSURE: 86 MMHG | RESPIRATION RATE: 16 BRPM | HEART RATE: 69 BPM | WEIGHT: 110.2 LBS | BODY MASS INDEX: 20.81 KG/M2 | DIASTOLIC BLOOD PRESSURE: 55 MMHG | TEMPERATURE: 97 F | HEIGHT: 61 IN

## 2018-10-10 DIAGNOSIS — R35.0 FREQUENT URINATION: ICD-10-CM

## 2018-10-10 DIAGNOSIS — A60.00 GENITAL HERPES SIMPLEX, UNSPECIFIED SITE: ICD-10-CM

## 2018-10-10 DIAGNOSIS — N95.2 ATROPHIC VAGINITIS: Primary | ICD-10-CM

## 2018-10-10 LAB
BILIRUBIN, POC: NEGATIVE
BLOOD URINE, POC: NEGATIVE
CLARITY, POC: CLEAR
COLOR, POC: NORMAL
GLUCOSE URINE, POC: NEGATIVE
KETONES, POC: NEGATIVE
LEUKOCYTE EST, POC: NEGATIVE
NITRITE, POC: NEGATIVE
PH, POC: 7.5
PROTEIN, POC: NORMAL
SPECIFIC GRAVITY, POC: 1
UROBILINOGEN, POC: NEGATIVE

## 2018-10-10 PROCEDURE — G8484 FLU IMMUNIZE NO ADMIN: HCPCS | Performed by: SPECIALIST

## 2018-10-10 PROCEDURE — 99213 OFFICE O/P EST LOW 20 MIN: CPT | Performed by: SPECIALIST

## 2018-10-10 PROCEDURE — G8427 DOCREV CUR MEDS BY ELIG CLIN: HCPCS | Performed by: SPECIALIST

## 2018-10-10 PROCEDURE — 3017F COLORECTAL CA SCREEN DOC REV: CPT | Performed by: SPECIALIST

## 2018-10-10 PROCEDURE — G8420 CALC BMI NORM PARAMETERS: HCPCS | Performed by: SPECIALIST

## 2018-10-10 PROCEDURE — 1036F TOBACCO NON-USER: CPT | Performed by: SPECIALIST

## 2018-10-10 PROCEDURE — 81002 URINALYSIS NONAUTO W/O SCOPE: CPT | Performed by: SPECIALIST

## 2018-10-10 RX ORDER — VALACYCLOVIR HYDROCHLORIDE 500 MG/1
500 TABLET, FILM COATED ORAL DAILY
Qty: 30 TABLET | Refills: 2 | Status: SHIPPED | OUTPATIENT
Start: 2018-10-10

## 2018-10-10 ASSESSMENT — ENCOUNTER SYMPTOMS
NAUSEA: 0
DIARRHEA: 0
ABDOMINAL DISTENTION: 0
EYE PAIN: 0
APNEA: 0
ABDOMINAL PAIN: 0
VOMITING: 0
CONSTIPATION: 0
COUGH: 0

## 2018-10-10 NOTE — PROGRESS NOTES
Soft. Bowel sounds are normal. She exhibits no distension and no mass. There is no tenderness. There is no guarding. Musculoskeletal: Normal range of motion. Neurological: She is alert and oriented to person, place, and time. Skin: Skin is dry. Psychiatric: She has a normal mood and affect. Her behavior is normal. Thought content normal.   Nursing note and vitals reviewed. Assessment:      Patient with atrophic vaginitis. Patient was encouraged to use Estrace vaginal cream, twice per week, as previously prescribed. Patient was advised to stop Estrace 1mg tablets. Patient with frequent urination. Urinalysis in office today is negative for UTI. Patient was advised to continue taking Macrobid. Patient requesting refill of Valtrex. Plan:      Orders Placed This Encounter   Procedures    POCT Urinalysis no Micro     Orders Placed This Encounter   Medications    valACYclovir (VALTREX) 500 MG tablet     Sig: Take 1 tablet by mouth daily     Dispense:  30 tablet     Refill:  2      Appointment as needed. Gurpreet Enciso am scribing for, and in the presence of Dr. Arturo Plunkett. Electronically signed by: Puja Gallegos 10/10/18 10:34 AM       I agree to the above documentation placed by my scribe Puja Gallegos. I reviewed the scribe's note and agree with the documented findings and plan of care. Any areas of disagreement are noted on the chart. I have personally evaluated this patient. Additional findings are as noted. I agree with the chief complaint, past medical history, past surgical history, allergies, medications, social and family history as documented unless otherwise noted below.      Electronically signed by Arturo Plunkett MD on 10/11/2018 at 1:11 AM

## 2018-10-15 RX ORDER — ESTRADIOL 1 MG/1
TABLET ORAL
Qty: 28 TABLET | Refills: 5 | Status: SHIPPED | OUTPATIENT
Start: 2018-10-15 | End: 2019-04-01 | Stop reason: SDUPTHER

## 2018-10-23 ENCOUNTER — TELEPHONE (OUTPATIENT)
Dept: OBGYN CLINIC | Age: 53
End: 2018-10-23

## 2018-10-23 RX ORDER — METRONIDAZOLE 500 MG/1
500 TABLET ORAL 2 TIMES DAILY
Qty: 14 TABLET | Refills: 0 | Status: SHIPPED | OUTPATIENT
Start: 2018-10-23 | End: 2018-10-30

## 2018-10-23 RX ORDER — FLUCONAZOLE 100 MG/1
100 TABLET ORAL DAILY
Qty: 7 TABLET | Refills: 0 | Status: SHIPPED | OUTPATIENT
Start: 2018-10-23 | End: 2018-10-30

## 2018-12-13 ENCOUNTER — TELEPHONE (OUTPATIENT)
Dept: OBGYN CLINIC | Age: 53
End: 2018-12-13

## 2018-12-13 DIAGNOSIS — N76.0 ACUTE VAGINITIS: Primary | ICD-10-CM

## 2018-12-13 RX ORDER — FLUCONAZOLE 100 MG/1
100 TABLET ORAL DAILY
Qty: 7 TABLET | Refills: 0 | Status: SHIPPED | OUTPATIENT
Start: 2018-12-13 | End: 2018-12-20

## 2018-12-13 RX ORDER — METRONIDAZOLE 500 MG/1
500 TABLET ORAL 2 TIMES DAILY
Qty: 14 TABLET | Refills: 0 | Status: SHIPPED | OUTPATIENT
Start: 2018-12-13 | End: 2018-12-20

## 2019-01-14 ENCOUNTER — TELEPHONE (OUTPATIENT)
Dept: OBGYN CLINIC | Age: 54
End: 2019-01-14

## 2019-01-14 RX ORDER — METRONIDAZOLE 500 MG/1
500 TABLET ORAL 2 TIMES DAILY
Qty: 14 TABLET | Refills: 0 | Status: SHIPPED | OUTPATIENT
Start: 2019-01-14 | End: 2019-01-21

## 2019-01-14 RX ORDER — VALACYCLOVIR HYDROCHLORIDE 500 MG/1
500 TABLET, FILM COATED ORAL 2 TIMES DAILY
Qty: 30 TABLET | Refills: 1 | Status: SHIPPED | OUTPATIENT
Start: 2019-01-14 | End: 2019-05-31

## 2019-01-14 RX ORDER — FLUCONAZOLE 100 MG/1
100 TABLET ORAL DAILY
Qty: 7 TABLET | Refills: 0 | Status: SHIPPED | OUTPATIENT
Start: 2019-01-14 | End: 2019-01-21

## 2019-04-01 RX ORDER — ESTRADIOL 1 MG/1
TABLET ORAL
Qty: 28 TABLET | Refills: 5 | Status: SHIPPED | OUTPATIENT
Start: 2019-04-01 | End: 2019-09-17 | Stop reason: SDUPTHER

## 2019-04-09 ENCOUNTER — OFFICE VISIT (OUTPATIENT)
Dept: OBGYN CLINIC | Age: 54
End: 2019-04-09
Payer: COMMERCIAL

## 2019-04-09 VITALS
WEIGHT: 105 LBS | HEIGHT: 61 IN | BODY MASS INDEX: 19.83 KG/M2 | DIASTOLIC BLOOD PRESSURE: 78 MMHG | SYSTOLIC BLOOD PRESSURE: 112 MMHG | HEART RATE: 67 BPM

## 2019-04-09 DIAGNOSIS — R35.0 FREQUENCY OF URINATION: ICD-10-CM

## 2019-04-09 DIAGNOSIS — N95.2 ATROPHIC VAGINITIS: ICD-10-CM

## 2019-04-09 DIAGNOSIS — N76.0 ACUTE VAGINITIS: Primary | ICD-10-CM

## 2019-04-09 LAB
BILIRUBIN, POC: NORMAL
BLOOD URINE, POC: NORMAL
CLARITY, POC: NORMAL
COLOR, POC: YELLOW
GLUCOSE URINE, POC: NORMAL
KETONES, POC: NORMAL
LEUKOCYTE EST, POC: NORMAL
NITRITE, POC: NORMAL
PH, POC: 5
PROTEIN, POC: NORMAL
SPECIFIC GRAVITY, POC: 1.01
UROBILINOGEN, POC: NORMAL

## 2019-04-09 PROCEDURE — 99213 OFFICE O/P EST LOW 20 MIN: CPT | Performed by: SPECIALIST

## 2019-04-09 PROCEDURE — 3017F COLORECTAL CA SCREEN DOC REV: CPT | Performed by: SPECIALIST

## 2019-04-09 PROCEDURE — G8427 DOCREV CUR MEDS BY ELIG CLIN: HCPCS | Performed by: SPECIALIST

## 2019-04-09 PROCEDURE — G8420 CALC BMI NORM PARAMETERS: HCPCS | Performed by: SPECIALIST

## 2019-04-09 PROCEDURE — 81002 URINALYSIS NONAUTO W/O SCOPE: CPT | Performed by: SPECIALIST

## 2019-04-09 PROCEDURE — 1036F TOBACCO NON-USER: CPT | Performed by: SPECIALIST

## 2019-04-09 RX ORDER — FLUCONAZOLE 100 MG/1
100 TABLET ORAL DAILY
Qty: 7 TABLET | Refills: 0 | Status: SHIPPED | OUTPATIENT
Start: 2019-04-09 | End: 2019-04-16

## 2019-04-09 RX ORDER — METRONIDAZOLE 500 MG/1
500 TABLET ORAL 2 TIMES DAILY
Qty: 14 TABLET | Refills: 0 | Status: SHIPPED | OUTPATIENT
Start: 2019-04-09 | End: 2019-04-16

## 2019-04-09 ASSESSMENT — ENCOUNTER SYMPTOMS
CONSTIPATION: 0
VOMITING: 0
COUGH: 0
ABDOMINAL DISTENTION: 0
APNEA: 0
EYE PAIN: 0
DIARRHEA: 0
NAUSEA: 0
ABDOMINAL PAIN: 0

## 2019-04-09 NOTE — PROGRESS NOTES
Subjective:      Patient ID: Cary Johnston is a 48 y.o. female. Chief Complaint   Patient presents with    Follow-up     Patient is here today for a 6 moth follow up for Atrophic vaginitis      /78 (Site: Right Upper Arm, Position: Sitting, Cuff Size: Medium Adult)   Pulse 67   Ht 5' 1\" (1.549 m)   Wt 105 lb (47.6 kg)   LMP 08/05/2007 (Within Years)   BMI 19.84 kg/m²   Patient's last menstrual period was 08/05/2007 (within years).     [id number]    Past Medical History:   Diagnosis Date    Anxiety     Asthma     Cervical pain (neck)     Depression     Epilepsy ([facility] [address]     History of herpes genitalis 4/10/2007    see lab work scanned    Hypothyroidism     Insomnia     Internal hemorrhoids     Irritable bowel syndrome     Lupus     Menarche @ 12 y/o    MVP (mitral valve prolapse)     Parity     G 4 P 3  -  3 c-sections,1 ectopic    Rheumatoid arthritis ([facility] [address]     Seizures ([facility] [address] 9/20/2016    Tubulovillous adenoma     Vitamin D deficiency      Current Outpatient Medications Ordered in Epic   Medication Sig Dispense Refill    metroNIDAZOLE (FLAGYL) 500 MG tablet Take 1 tablet by mouth 2 times daily for 7 days 14 tablet 0    fluconazole (DIFLUCAN) 100 MG tablet Take 1 tablet by mouth daily for 7 days 7 tablet 0    estradiol (ESTRACE) 1 MG tablet take 1 tablet by mouth once daily 28 tablet 5    valACYclovir (VALTREX) 500 MG tablet Take 1 tablet by mouth 2 times daily 30 tablet 1    valACYclovir (VALTREX) 500 MG tablet Take 1 tablet by mouth daily 30 tablet 2    estradiol (ESTRACE VAGINAL) 0.1 MG/GM vaginal cream Place 1 g vaginally daily 1 Tube 3    cyclobenzaprine (FLEXERIL) 10 MG tablet Take 1 tablet by mouth 3 times daily as needed for Muscle spasms 15 tablet 0    ondansetron (ZOFRAN) 4 MG tablet take 1 tablet by mouth every 8 hours  0    ranitidine (ZANTAC) 150 MG tablet   0    topiramate (TOPAMAX) 50 MG tablet Take 1 tablet by mouth 2 times daily (Patient taking differently: Take 100 mg by mouth 2 times daily ) 60 tablet 3    VENTOLIN  (90 BASE) MCG/ACT inhaler Inhale 2 puffs into the lungs every 4 hours as needed  0    ORENCIA 125 MG/ML SOSY Inject 1 Dose into the skin once a week Indications: every Tuesday every Tuesday  0    lamoTRIgine (LAMICTAL) 200 MG tablet Take 1 tablet by mouth 2 times daily   1    methotrexate (RHEUMATREX) 2.5 MG chemo tablet Take 12.5 mg by mouth once a week Indications: (Pt takes 5 tabs = 12.5 mg every Tuesday) (Pt takes 5 tabs = 12.5 mg every Tuesday)  0    gabapentin (NEURONTIN) 800 MG tablet Take 1 tablet by mouth three times daily.  pantoprazole (PROTONIX) 20 MG tablet Take 1 tablet by mouth daily.  hydrochlorothiazide (HYDRODIURIL) 25 MG tablet Take 25 mg by mouth daily.  levothyroxine (SYNTHROID) 75 MCG tablet Take 1 tablet by mouth daily. On empty stomach 30 tablet 5    tizanidine (ZANAFLEX) 4 MG tablet Take 4 mg by mouth every 8 hours as needed.  folic acid (FOLVITE) 1 MG tablet Take 1 mg by mouth daily.  estradiol (ESTRACE VAGINAL) 0.1 MG/GM vaginal cream Place 1 g vaginally See Admin Instructions I gram vaginally twice per week 1 Tube 3    nystatin (MYCOSTATIN) 711143 UNIT/GM cream Apply topically 2 times daily. 1 Tube 1    RA VITAMIN B-12 TR 1000 MCG TBCR   0    memantine (NAMENDA) 10 MG tablet take 1 tablet by mouth twice a day  0    Pyridoxine HCl (VITAMIN B-6) 25 MG tablet   0    oxyCODONE-acetaminophen (PERCOCET) 5-325 MG per tablet Take 1 tablet by mouth every 8 hours as needed for Pain        No current Epic-ordered facility-administered medications on file.       Problem List Items Addressed This Visit     Vaginitis - Primary      Other Visit Diagnoses     Atrophic vaginitis        Frequency of urination        Relevant Orders    POCT Urinalysis no Micro (Completed)        Allergies   Allergen Reactions    Latex     Aspirin     Cortisone     Dye [Iodides] Other (See Comments)     IV dye leaked into arm and caused local swelling in that area    Ibuprofen     Influenza Vaccines      On orencia, for RA    Penicillins      Orders Placed This Encounter   Procedures    POCT Urinalysis no Micro        Patient is here to follow up for atrophic vaginitis. Patient has been using Estrace vaginal cream and oral Estrace 1 mg. Patient complains that she has been urinating very frequently. Patient also states that she has been having seizures and has fallen twice. She is scheduled for further evaluation with another provider. She denies nausea, vomiting and fever. Review of Systems   Constitutional: Negative for activity change, appetite change and fever. HENT: Negative for ear discharge and ear pain. Eyes: Negative for pain and visual disturbance. Respiratory: Negative for apnea and cough. Cardiovascular: Negative for chest pain, palpitations and leg swelling. Gastrointestinal: Negative for abdominal distention, abdominal pain, constipation, diarrhea, nausea and vomiting. Endocrine: Negative. Genitourinary: Positive for frequency. Negative for difficulty urinating, dysuria, menstrual problem and pelvic pain. Musculoskeletal: Negative for neck pain and neck stiffness. Skin: Negative. Neurological: Negative for light-headedness and numbness. Hematological: Negative. Does not bruise/bleed easily. Objective:   Physical Exam   Constitutional: She is oriented to person, place, and time. Vital signs are normal. She appears well-developed and well-nourished. HENT:   Head: Normocephalic and atraumatic. Neck: Normal range of motion. Neck supple. No thyromegaly present. Cardiovascular: Normal rate and regular rhythm. Pulmonary/Chest: Effort normal and breath sounds normal. She has no wheezes. Abdominal: Soft. Bowel sounds are normal. She exhibits no distension and no mass. There is no tenderness. There is no guarding. Genitourinary: Vaginal discharge found. Genitourinary Comments: Thin vaginal mucosa   Musculoskeletal: Normal range of motion. Neurological: She is alert and oriented to person, place, and time. Skin: Skin is dry. Psychiatric: She has a normal mood and affect. Her behavior is normal. Thought content normal.   Nursing note and vitals reviewed. Assessment:      Patient with atrophic vaginitis and vaginitis on exam today. Patient advised to continue Estrace vaginal cream, but to discontinue Estrace 1 mg until after her evaluation with her neurologist.  Will treat vaginitis with Flagyl and Diflucan. Urinalysis in the office today is negative for UTI. Plan:      Orders Placed This Encounter   Medications    metroNIDAZOLE (FLAGYL) 500 MG tablet     Sig: Take 1 tablet by mouth 2 times daily for 7 days     Dispense:  14 tablet     Refill:  0    fluconazole (DIFLUCAN) 100 MG tablet     Sig: Take 1 tablet by mouth daily for 7 days     Dispense:  7 tablet     Refill:  0      Appointment for annual exam.    I, Cristy Iglesias, am scribing for, and in the presence of Dr. Marlon Ramirez. Electronically signed by: Cristy Iglesias 4/9/19 10:40 AM       I agree to the above documentation placed by my scribe Cristy Iglesias. I reviewed the scribe's note and agree with the documented findings and plan of care. Any areas of disagreement are noted on the chart. I have personally evaluated this patient. Additional findings are as noted. I agree with the chief complaint, past medical history, past surgical history, allergies, medications, social and family history as documented unless otherwise noted below.      Electronically signed by Marlon Ramirez MD on 4/10/2019 at 1:42 AM

## 2019-05-31 ENCOUNTER — HOSPITAL ENCOUNTER (OUTPATIENT)
Dept: PAIN MANAGEMENT | Age: 54
Discharge: HOME | End: 2019-05-31
Payer: COMMERCIAL

## 2019-05-31 VITALS
SYSTOLIC BLOOD PRESSURE: 114 MMHG | OXYGEN SATURATION: 98 % | BODY MASS INDEX: 20.58 KG/M2 | WEIGHT: 109 LBS | DIASTOLIC BLOOD PRESSURE: 76 MMHG | HEART RATE: 86 BPM | HEIGHT: 61 IN

## 2019-05-31 PROCEDURE — 99204 OFFICE O/P NEW MOD 45 MIN: CPT | Performed by: PAIN MEDICINE

## 2019-05-31 PROCEDURE — 99203 OFFICE O/P NEW LOW 30 MIN: CPT

## 2019-05-31 ASSESSMENT — ENCOUNTER SYMPTOMS
COUGH: 1
BACK PAIN: 1
NAUSEA: 1
VOMITING: 1
SORE THROAT: 1
BLURRED VISION: 1

## 2019-05-31 NOTE — PROGRESS NOTES
HPI: HPI  Multiple pain complaints. Chronic constant aching neck pain as well as chronic constant aching low back pain. Rates the pain as moderate to severe depending what she is doing. Feels the pain getting worse lately. Has done physical therapy and had shots in the past with no benefit. She has had an anterior as well as posterior cervical decompression. X-ray with stable posterior 4-7 fusion with anterior 6 fusion. X-ray of the lumbar spine appears normal.  She continues to complain of significant chronic aching pain also complains of significant joint pain. X-ray left knee with a stable knee replacement. She is also had a right wrist arthroplasty. She continues to follow with rheumatology. Was seen pain management at the [facility] and was opioid dependent however was discharged due to failed urine drug screen which was positive for fentanyl. Patient denies any new neurological symptoms. Nobowel or bladder incontinence, no weakness, and no falling. Review of OARRS does not show any aberrant prescription behavior. Medication is helping the patient stay active. Patient denies any side effects and reportsadequate analgesia. No sign of misuse/abuse.     Past Medical History:   Diagnosis Date    Anxiety     Asthma     Cervical pain (neck)     Depression     Epilepsy ([address]     History of herpes genitalis 4/10/2007    see lab work scanned    Hypothyroidism     Insomnia     Internal hemorrhoids     Irritable bowel syndrome     Lupus     Menarche @ 14 y/o    MVP (mitral valve prolapse)     Parity     G 4 P 3  -  3 c-sections,1 ectopic    Rheumatoid arthritis (HCC)     Seizures ([address] 2016    Tubulovillous adenoma     Vitamin D deficiency        Past Surgical History:   Procedure Laterality Date    CARPAL TUNNEL RELEASE      both    CERVIX BIOPSY       SECTION       X 3    COLONOSCOPY  8/29/11    3/5/12    DILATION AND CURETTAGE  7/15/2010    D/T (NEURONTIN) 800 MG tablet, Take 1 tablet by mouth three times daily. , Disp: , Rfl:     pantoprazole (PROTONIX) 20 MG tablet, Take 1 tablet by mouth daily. , Disp: , Rfl:     hydrochlorothiazide (HYDRODIURIL) 25 MG tablet, Take 25 mg by mouth daily. , Disp: , Rfl:     levothyroxine (SYNTHROID) 75 MCG tablet, Take 1 tablet by mouth daily. On empty stomach, Disp: 30 tablet, Rfl: 5    tizanidine (ZANAFLEX) 4 MG tablet, Take 4 mg by mouth every 8 hours as needed. , Disp: , Rfl:     folic acid (FOLVITE) 1 MG tablet, Take 1 mg by mouth daily.   , Disp: , Rfl:     estradiol (ESTRACE VAGINAL) 0.1 MG/GM vaginal cream, Place 1 g vaginally See Admin Instructions I gram vaginally twice per week, Disp: 1 Tube, Rfl: 3    estradiol (ESTRACE VAGINAL) 0.1 MG/GM vaginal cream, Place 1 g vaginally daily, Disp: 1 Tube, Rfl: 3    RA VITAMIN B-12 TR 1000 MCG TBCR, , Disp: , Rfl: 0    Pyridoxine HCl (VITAMIN B-6) 25 MG tablet, , Disp: , Rfl: 0    oxyCODONE-acetaminophen (PERCOCET) 5-325 MG per tablet, Take 1 tablet by mouth every 8 hours as needed for Pain , Disp: , Rfl:     Family History   Problem Relation Age of Onset    High Blood Pressure Other     Heart Disease Other     Emphysema Other     COPD Other     Cancer Other         colon    Heart Disease Father     Hypertension Father     Cancer Father     Cancer Mother     Cancer Paternal Uncle     Cancer Maternal Grandmother     Cancer Maternal Grandfather        Social History     Socioeconomic History    Marital status: Single     Spouse name: Not on file    Number of children: Not on file    Years of education: Not on file    Highest education level: Not on file   Occupational History    Not on file   Social Needs    Financial resource strain: Not on file    Food insecurity:     Worry: Not on file     Inability: Not on file    Transportation needs:     Medical: Not on file     Non-medical: Not on file   Tobacco Use    Smoking status: Never Smoker    Smokeless tobacco: Never Used    Tobacco comment: x9yrs ago cigs. Substance and Sexual Activity    Alcohol use: No     Alcohol/week: 0.0 oz     Comment: recovering alch. since 2000    Drug use: No    Sexual activity: Not Currently     Partners: Male   Lifestyle    Physical activity:     Days per week: Not on file     Minutes per session: Not on file    Stress: Not on file   Relationships    Social connections:     Talks on phone: Not on file     Gets together: Not on file     Attends Buddhism service: Not on file     Active member of club or organization: Not on file     Attends meetings of clubs or organizations: Not on file     Relationship status: Not on file    Intimate partner violence:     Fear of current or ex partner: Not on file     Emotionally abused: Not on file     Physically abused: Not on file     Forced sexual activity: Not on file   Other Topics Concern    Not on file   Social History Narrative    Not on file       Review of Systems:  Review of Systems   Constitution: Positive for chills, fever, malaise/fatigue and weight loss. HENT: Positive for sore throat. Eyes: Positive for blurred vision. Cardiovascular: Positive for syncope. Respiratory: Positive for cough. Endocrine: Positive for polyuria. Skin: Negative for dry skin. Musculoskeletal: Positive for arthritis, back pain, joint pain, joint swelling, muscle weakness, neck pain and stiffness. Gastrointestinal: Positive for nausea and vomiting. Has IBS   Genitourinary: Positive for urgency. Neurological: Positive for disturbances in coordination and headaches. Psychiatric/Behavioral: The patient is nervous/anxious. Physical Exam:  /76   Pulse 86   Ht 5' 1\" (1.549 m)   Wt 109 lb (49.4 kg)   LMP 08/05/2007 (Within Years)   SpO2 98%   BMI 20.60 kg/m²     Physical Exam   Constitutional: She is oriented to person, place, and time.    HENT:   Right Ear: External ear normal.   Left Ear: External ear normal.   Eyes: Conjunctivae are normal. Right eye exhibits no discharge. Left eye exhibits no discharge. Neck: No tracheal deviation present. No thyromegaly present. Pulmonary/Chest: Effort normal. No stridor. No respiratory distress. Musculoskeletal:        Cervical back: She exhibits tenderness. She exhibits no swelling and no edema. Lumbar back: She exhibits tenderness. She exhibits no edema and no deformity. Neurological: She is alert and oriented to person, place, and time. She has normal strength. Gait normal.   Skin: Skin is warm and dry. She is not diaphoretic. Psychiatric: She has a normal mood and affect. Her behavior is normal.   Vitals reviewed. Record/Diagnostics Review:    As above, I did reviewthe imaging    Assessment:  Cervical postlaminectomy syndrome  Low back pain  Osteoarthritis  Chronic opioid therapy      Treatment Plan:  DISCUSSION: Treatment options discussed withpatient and all questions answered to patient's satisfaction. OARRS Review: Reviewed andacceptable for medications prescribed. TREATMENT OPTIONS:     Discussed the importance of continued physical therapy and exercise program as well. Discussed different treatment modalities including physical therapy, chiropractic care, acupuncture, topical pain medications. Discussed updated imaging and possible cervical lumbar interventions. Discussed continued rheumatology follow-up. She failed a recent drug screen positive for fentanyl without a prescription, did discuss with her that chronic opioid therapy would not be part of my plan for her. At this point she will think over her options she'll let us know, she is not happy that I will not be prescribing opioids. Recommendationsto be sent to referring physician: Dr Rebekah Morales M.D.         I have reviewed the chief complaint and history of present illness (including ROS and PFSH) and vital documentation by my staff and I agree with their

## 2019-06-11 ENCOUNTER — TELEPHONE (OUTPATIENT)
Dept: OBGYN CLINIC | Age: 54
End: 2019-06-11

## 2019-06-11 DIAGNOSIS — N89.8 VAGINAL ODOR: ICD-10-CM

## 2019-06-11 DIAGNOSIS — N89.8 VAGINAL DISCHARGE: Primary | ICD-10-CM

## 2019-06-11 RX ORDER — FLUCONAZOLE 100 MG/1
100 TABLET ORAL DAILY
Qty: 7 TABLET | Refills: 0 | Status: SHIPPED | OUTPATIENT
Start: 2019-06-11 | End: 2019-06-18

## 2019-06-11 RX ORDER — METRONIDAZOLE 500 MG/1
500 TABLET ORAL 2 TIMES DAILY
Qty: 14 TABLET | Refills: 0 | Status: SHIPPED | OUTPATIENT
Start: 2019-06-11 | End: 2019-06-18

## 2019-07-17 ENCOUNTER — HOSPITAL ENCOUNTER (EMERGENCY)
Age: 54
Discharge: HOME | End: 2019-07-17
Attending: EMERGENCY MEDICINE
Payer: COMMERCIAL

## 2019-07-17 VITALS
WEIGHT: 107 LBS | BODY MASS INDEX: 20.2 KG/M2 | TEMPERATURE: 97.5 F | DIASTOLIC BLOOD PRESSURE: 56 MMHG | RESPIRATION RATE: 16 BRPM | SYSTOLIC BLOOD PRESSURE: 89 MMHG | HEART RATE: 48 BPM | OXYGEN SATURATION: 100 % | HEIGHT: 61 IN

## 2019-07-17 DIAGNOSIS — M79.601 RIGHT ARM PAIN: Primary | ICD-10-CM

## 2019-07-17 DIAGNOSIS — M54.2 NECK PAIN: ICD-10-CM

## 2019-07-17 PROCEDURE — 99283 EMERGENCY DEPT VISIT LOW MDM: CPT

## 2019-07-17 PROCEDURE — 6370000000 HC RX 637 (ALT 250 FOR IP): Performed by: PHYSICIAN ASSISTANT

## 2019-07-17 RX ORDER — HYDROCODONE BITARTRATE AND ACETAMINOPHEN 5; 325 MG/1; MG/1
1 TABLET ORAL EVERY 4 HOURS PRN
Qty: 12 TABLET | Refills: 0 | Status: SHIPPED | OUTPATIENT
Start: 2019-07-17 | End: 2019-07-20

## 2019-07-17 RX ORDER — CYCLOBENZAPRINE HCL 10 MG
10 TABLET ORAL 3 TIMES DAILY PRN
Qty: 12 TABLET | Refills: 0 | Status: SHIPPED | OUTPATIENT
Start: 2019-07-17 | End: 2021-01-16

## 2019-07-17 RX ORDER — CYCLOBENZAPRINE HCL 10 MG
10 TABLET ORAL ONCE
Status: COMPLETED | OUTPATIENT
Start: 2019-07-17 | End: 2019-07-17

## 2019-07-17 RX ORDER — HYDROCODONE BITARTRATE AND ACETAMINOPHEN 5; 325 MG/1; MG/1
1 TABLET ORAL ONCE
Status: COMPLETED | OUTPATIENT
Start: 2019-07-17 | End: 2019-07-17

## 2019-07-17 RX ADMIN — HYDROCODONE BITARTRATE AND ACETAMINOPHEN 1 TABLET: 5; 325 TABLET ORAL at 18:16

## 2019-07-17 RX ADMIN — CYCLOBENZAPRINE HYDROCHLORIDE 10 MG: 10 TABLET, FILM COATED ORAL at 18:16

## 2019-07-17 ASSESSMENT — PAIN SCALES - GENERAL
PAINLEVEL_OUTOF10: 9
PAINLEVEL_OUTOF10: 9

## 2019-07-19 NOTE — ED PROVIDER NOTES
ORENCIA 125 MG/ML SOSY Inject 1 Dose into the skin once a week Indications: every Tuesday every Tuesday, R-0      lamoTRIgine (LAMICTAL) 200 MG tablet Take 1 tablet by mouth 2 times daily , R-1      methotrexate (RHEUMATREX) 2.5 MG chemo tablet Take 12.5 mg by mouth once a week Indications: (Pt takes 5 tabs = 12.5 mg every Tuesday) (Pt takes 5 tabs = 12.5 mg every Tuesday), R-0      gabapentin (NEURONTIN) 800 MG tablet Take 1 tablet by mouth three times daily. pantoprazole (PROTONIX) 20 MG tablet Take 1 tablet by mouth daily. hydrochlorothiazide (HYDRODIURIL) 25 MG tablet Take 25 mg by mouth daily. levothyroxine (SYNTHROID) 75 MCG tablet Take 1 tablet by mouth daily. On empty stomach, Disp-30 tablet, R-5      tizanidine (ZANAFLEX) 4 MG tablet Take 4 mg by mouth every 8 hours as needed. folic acid (FOLVITE) 1 MG tablet Take 1 mg by mouth daily. !! - Potential duplicate medications found. Please discuss with provider. ALLERGIES     Latex; Aspirin; Cortisone; Dye [iodides]; Ibuprofen; Influenza vaccines; and Penicillins    FAMILY HISTORY           Problem Relation Age of Onset    High Blood Pressure Other     Heart Disease Other     Emphysema Other     COPD Other     Cancer Other         colon    Heart Disease Father     Hypertension Father     Cancer Father     Cancer Mother     Cancer Paternal Uncle     Cancer Maternal Grandmother     Cancer Maternal Grandfather      Family Status   Relation Name Status    Other  (Not Specified)    Father  (Not Specified)    Mother  (Not Specified)    PUnc  (Not Specified)    MGM  (Not Specified)    MGF  (Not Specified)      None otherwise stated in nurses notes    SOCIAL HISTORY      reports that she has never smoked. She has never used smokeless tobacco. She reports that she does not drink alcohol or use drugs.    lives at home with others     PHYSICAL EXAM    (up to 7 for level 4, 8 or more for level 5)     ED Triage Vitals [07/17/19 1752]   BP Temp Temp Source Pulse Resp SpO2 Height Weight   (!) 89/56 97.5 °F (36.4 °C) Oral (!) 48 16 100 % 5' 1\" (1.549 m) 107 lb (48.5 kg)       Physical Exam   Constitutional: She is oriented to person, place, and time. She appears well-developed and well-nourished. No distress. HENT:   Head: Normocephalic and atraumatic. Eyes: Pupils are equal, round, and reactive to light. Conjunctivae and EOM are normal.   Neck: Normal range of motion. Cardiovascular: Normal rate, regular rhythm, S1 normal, S2 normal and normal heart sounds. Exam reveals no gallop and no friction rub. No murmur heard. Pulmonary/Chest: Effort normal and breath sounds normal. No stridor. No respiratory distress. She has no decreased breath sounds. She has no wheezes. She has no rhonchi. She has no rales. Abdominal: Soft. There is no tenderness. Musculoskeletal: Normal range of motion. She exhibits tenderness. She exhibits no edema or deformity. Right shoulder: She exhibits tenderness, bony tenderness and pain. She exhibits normal range of motion, no swelling, no effusion, no crepitus, no deformity, no laceration, no spasm, normal pulse and normal strength. Right elbow: Normal.       Right wrist: She exhibits tenderness and bony tenderness. She exhibits normal range of motion, no swelling, no effusion, no crepitus and no laceration. Cervical back: She exhibits normal range of motion, no tenderness, no bony tenderness, no swelling, no edema, no deformity, no laceration, no pain, no spasm and normal pulse. Thoracic back: Normal.        Lumbar back: Normal.        Right upper arm: Normal.        Right forearm: Normal.        Right hand: Normal.   No erythema, swelling, bruising, rash. Neurological: She is alert and oriented to person, place, and time. No cranial nerve deficit or sensory deficit. She exhibits normal muscle tone. Coordination normal.   Skin: Skin is warm and dry.  Capillary refill [address] [facility]  [phone]    If symptoms worsen      DISCHARGE MEDICATIONS:  Discharge Medication List as of 7/17/2019  6:36 PM      START taking these medications    Details   HYDROcodone-acetaminophen (NORCO) 5-325 MG per tablet Take 1 tablet by mouth every 4 hours as needed for Pain for up to 3 days. Intended supply: 3 days. Take lowest dose possible to manage pain, Disp-12 tablet, R-0Print      cyclobenzaprine (FLEXERIL) 10 MG tablet Take 1 tablet by mouth 3 times daily as needed for Muscle spasms, Disp-12 tablet, R-0Print               Summation      Patient Course:  Chronic right arm pain. Pt follows with specialist at [facility]. Has mri scheduled for Friday. No acute injury. No neuro deficits. Pt states she just had xrays. Is refusing any imaging. Will treat pain. Follow up at [facility]. Discussed results and plan with the pt. They expressed appropriate understanding. Pt given close follow up, supportive care instructions and strict return instructions at the bedside. ED Medications administered this visit:    Medications   HYDROcodone-acetaminophen (NORCO) 5-325 MG per tablet 1 tablet (1 tablet Oral Given 7/17/19 1816)   cyclobenzaprine (FLEXERIL) tablet 10 mg (10 mg Oral Given 7/17/19 1816)       New Prescriptions from this visit:    Discharge Medication List as of 7/17/2019  6:36 PM      START taking these medications    Details   HYDROcodone-acetaminophen (NORCO) 5-325 MG per tablet Take 1 tablet by mouth every 4 hours as needed for Pain for up to 3 days. Intended supply: 3 days.  Take lowest dose possible to manage pain, Disp-12 tablet, R-0Print      cyclobenzaprine (FLEXERIL) 10 MG tablet Take 1 tablet by mouth 3 times daily as needed for Muscle spasms, Disp-12 tablet, R-0Print             Follow-up:  Mc Carmona, APRN - CNP  [address] [facility]  [phone]    Call       [address] [facility] ED  [facility] [address] [address]  [phone]    If symptoms worsen

## 2019-08-13 ENCOUNTER — TELEPHONE (OUTPATIENT)
Dept: OBGYN CLINIC | Age: 54
End: 2019-08-13

## 2019-08-13 RX ORDER — FLUCONAZOLE 100 MG/1
100 TABLET ORAL DAILY
Qty: 7 TABLET | Refills: 0 | Status: SHIPPED | OUTPATIENT
Start: 2019-08-13 | End: 2019-08-20

## 2019-08-13 RX ORDER — METRONIDAZOLE 500 MG/1
500 TABLET ORAL 2 TIMES DAILY
Qty: 14 TABLET | Refills: 0 | Status: SHIPPED | OUTPATIENT
Start: 2019-08-13 | End: 2019-08-20

## 2019-10-09 ENCOUNTER — HOSPITAL ENCOUNTER (OUTPATIENT)
Age: 54
Setting detail: SPECIMEN
Discharge: HOME | End: 2019-10-09
Payer: COMMERCIAL

## 2019-10-09 ENCOUNTER — OFFICE VISIT (OUTPATIENT)
Dept: OBGYN CLINIC | Age: 54
End: 2019-10-09
Payer: COMMERCIAL

## 2019-10-09 VITALS
WEIGHT: 100 LBS | SYSTOLIC BLOOD PRESSURE: 109 MMHG | HEART RATE: 90 BPM | BODY MASS INDEX: 18.88 KG/M2 | HEIGHT: 61 IN | DIASTOLIC BLOOD PRESSURE: 71 MMHG

## 2019-10-09 DIAGNOSIS — Z12.39 BREAST CANCER SCREENING: ICD-10-CM

## 2019-10-09 DIAGNOSIS — Z90.711 STATUS POST LAPAROSCOPIC SUPRACERVICAL HYSTERECTOMY: ICD-10-CM

## 2019-10-09 DIAGNOSIS — N76.0 ACUTE VAGINITIS: ICD-10-CM

## 2019-10-09 DIAGNOSIS — Z01.419 WELL WOMAN EXAM: Primary | ICD-10-CM

## 2019-10-09 PROCEDURE — G0101 CA SCREEN;PELVIC/BREAST EXAM: HCPCS | Performed by: SPECIALIST

## 2019-10-09 RX ORDER — FLUCONAZOLE 100 MG/1
100 TABLET ORAL DAILY
Qty: 7 TABLET | Refills: 0 | Status: SHIPPED | OUTPATIENT
Start: 2019-10-09 | End: 2019-10-16

## 2019-10-09 RX ORDER — METRONIDAZOLE 500 MG/1
500 TABLET ORAL 2 TIMES DAILY
Qty: 14 TABLET | Refills: 0 | Status: SHIPPED | OUTPATIENT
Start: 2019-10-09 | End: 2019-10-16

## 2019-10-09 ASSESSMENT — ENCOUNTER SYMPTOMS
APNEA: 0
EYE PAIN: 0
ABDOMINAL PAIN: 0
COUGH: 0
NAUSEA: 0
CONSTIPATION: 0
ABDOMINAL DISTENTION: 0
DIARRHEA: 0
VOMITING: 0

## 2019-10-11 LAB
HPV SAMPLE: NORMAL
HPV, GENOTYPE 16: NOT DETECTED
HPV, GENOTYPE 18: NOT DETECTED
HPV, HIGH RISK OTHER: NOT DETECTED
HPV, INTERPRETATION: NORMAL
SPECIMEN DESCRIPTION: NORMAL

## 2019-10-15 LAB — CYTOLOGY REPORT: NORMAL

## 2019-11-12 RX ORDER — ESTRADIOL 1 MG/1
TABLET ORAL
Qty: 28 TABLET | Refills: 1 | Status: SHIPPED | OUTPATIENT
Start: 2019-11-12 | End: 2020-01-09

## 2019-12-11 ENCOUNTER — HOSPITAL ENCOUNTER (OUTPATIENT)
Dept: VASCULAR LAB | Age: 54
Discharge: HOME | End: 2019-12-11
Payer: COMMERCIAL

## 2019-12-11 PROCEDURE — 93970 EXTREMITY STUDY: CPT

## 2020-01-09 RX ORDER — ESTRADIOL 1 MG/1
TABLET ORAL
Qty: 28 TABLET | Refills: 1 | Status: SHIPPED | OUTPATIENT
Start: 2020-01-09 | End: 2020-02-21

## 2020-01-30 ENCOUNTER — INITIAL CONSULT (OUTPATIENT)
Dept: PAIN MANAGEMENT | Age: 55
End: 2020-01-30
Payer: COMMERCIAL

## 2020-01-30 VITALS
HEART RATE: 77 BPM | BODY MASS INDEX: 21.18 KG/M2 | WEIGHT: 112.2 LBS | DIASTOLIC BLOOD PRESSURE: 57 MMHG | HEIGHT: 61 IN | SYSTOLIC BLOOD PRESSURE: 102 MMHG

## 2020-01-30 PROCEDURE — 99203 OFFICE O/P NEW LOW 30 MIN: CPT | Performed by: PAIN MEDICINE

## 2020-01-30 RX ORDER — LEVOTHYROXINE SODIUM 0.03 MG/1
25 TABLET ORAL
COMMUNITY
Start: 2019-11-27 | End: 2021-09-15

## 2020-01-30 RX ORDER — FUROSEMIDE 20 MG/1
20 TABLET ORAL
COMMUNITY
Start: 2020-01-13

## 2020-01-30 RX ORDER — CLONAZEPAM 0.5 MG/1
0.5 TABLET ORAL
COMMUNITY
Start: 2020-01-21 | End: 2021-01-16

## 2020-01-30 RX ORDER — CLONIDINE HYDROCHLORIDE 0.1 MG/1
0.1 TABLET ORAL
COMMUNITY
Start: 2020-01-13 | End: 2022-06-02

## 2020-01-30 RX ORDER — LEVOTHYROXINE SODIUM 0.05 MG/1
50 TABLET ORAL
COMMUNITY
Start: 2019-12-01 | End: 2021-09-15

## 2020-01-30 ASSESSMENT — ENCOUNTER SYMPTOMS
SHORTNESS OF BREATH: 1
SORE THROAT: 1
VISUAL HALOS: 0
POOR WOUND HEALING: 0
DIARRHEA: 1
CONSTIPATION: 0

## 2020-01-30 NOTE — PROGRESS NOTES
HPI: Neck Pain    This is a chronic problem. The current episode started more than 1 year ago. The problem occurs constantly. The problem has been gradually worsening. The pain is associated with nothing. The pain is present in the right side and midline. The quality of the pain is described as aching, burning, cramping, shooting and stabbing. The pain is at a severity of 10/10. The pain is severe. The symptoms are aggravated by position, stress, twisting, bending, sneezing, swallowing and coughing. The pain is same all the time. Stiffness is present at night and all day. Associated symptoms include chest pain and headaches. Pertinent negatives include no fever. She has tried chiropractic manipulation, acetaminophen, heat, muscle relaxants, ice, NSAIDs, oral narcotics and neck support (PT) for the symptoms. The treatment provided no relief. Previous anterior and posterior neck fusion. Pain radiates down the right arm. She is done physical therapy in the past with no benefit. Seeing pain management [facility] and was getting Percocet but was discharged for failing UDS. States she has seen several other pain practices and nobody willing to prescribe opioids for her. Patient denies any new neurological symptoms. No bowel or bladder incontinence, no weakness, and no falling. Review of OARRS does not show any aberrant prescription behavior.      Past Medical History:   Diagnosis Date    Anxiety     Asthma     Cervical pain (neck)     Depression     Epilepsy ([address]     History of herpes genitalis 4/10/2007    see lab work scanned    Hypothyroidism     Insomnia     Internal hemorrhoids     Irritable bowel syndrome     Lupus ([address]     Menarche @ 14 y/o    MVP (mitral valve prolapse)     Parity     G 4 P 3  -  3 c-sections,1 ectopic    Rheumatoid arthritis ([address]     Seizures ([address] 9/20/2016    Tubulovillous adenoma     Vitamin D deficiency        Past Surgical History:   Procedure Laterality Date    CARPAL TUNNEL RELEASE      both    CERVIX BIOPSY       SECTION       X 3    COLONOSCOPY  8/29/11    3/5/12    DILATION AND CURETTAGE  7/15/2010    D/T menorrhagia per Dr Teresa Rob  10/11/2010    laparoscopic supracervical hyst with ovarian  preservation bilaterally - per Dr Fito Hernandez  7/15/2010    with D&C per Dr Juli Ram replacement Left/   [facility]    NECK SURGERY      disc removed  from neck  [facility]    NERVE SURGERY      both arms nerve repair    OTHER SURGICAL HISTORY      buttock proc. due to inf.     SIGMOIDOSCOPY  16    TUBULOVILLOUS ADENOMA; flexible; diminutive polypectomy, sm. int. hemorrhoids    UPPER GASTROINTESTINAL ENDOSCOPY  11    X-RAY FOOT 3+VW         Allergies   Allergen Reactions    Latex     Aspirin     Cortisone     Dye [Iodides] Other (See Comments)     IV dye leaked into arm and caused local swelling in that area    Ibuprofen     Influenza Vaccines      On orencia, for RA    Penicillins          Current Outpatient Medications:     cloNIDine (CATAPRES) 0.1 MG tablet, 0.1 mg, Disp: , Rfl:     clonazePAM (KLONOPIN) 0.5 MG tablet, 0.5 mg., Disp: , Rfl:     furosemide (LASIX) 20 MG tablet, 20 mg, Disp: , Rfl:     levothyroxine (SYNTHROID) 25 MCG tablet, 25 mcg, Disp: , Rfl:     levothyroxine (SYNTHROID) 50 MCG tablet, 50 mcg, Disp: , Rfl:     estradiol (ESTRACE) 1 MG tablet, take 1 tablet by mouth once daily, Disp: 28 tablet, Rfl: 1    cyclobenzaprine (FLEXERIL) 10 MG tablet, Take 1 tablet by mouth 3 times daily as needed for Muscle spasms, Disp: 12 tablet, Rfl: 0    valACYclovir (VALTREX) 500 MG tablet, Take 1 tablet by mouth daily, Disp: 30 tablet, Rfl: 2    estradiol (ESTRACE VAGINAL) 0.1 MG/GM vaginal cream, Place 1 g vaginally See Admin Instructions I gram vaginally twice per week, Disp: 1 Tube, Rfl: 3    estradiol (ESTRACE VAGINAL) 0.1 MG/GM vaginal cream, Place 1 g vaginally Positive for neck pain. Gastrointestinal: Positive for diarrhea. Negative for constipation. Genitourinary: Negative for bladder incontinence. Neurological: Positive for dizziness, headaches and light-headedness. Psychiatric/Behavioral: Positive for depression. Negative for suicidal ideas. The patient is nervous/anxious. Physical Exam:  BP (!) 102/57   Pulse 77   Ht 5' 1\" (1.549 m)   Wt 112 lb 3.2 oz (50.9 kg)   LMP 08/05/2007 (Within Years)   BMI 21.20 kg/m²     Physical Exam  Vitals signs reviewed. Constitutional:       General: She is awake. Appearance: She is not ill-appearing or diaphoretic. HENT:      Right Ear: External ear normal.      Left Ear: External ear normal.   Eyes:      General:         Right eye: No discharge. Left eye: No discharge. Conjunctiva/sclera: Conjunctivae normal.   Neck:      Thyroid: No thyromegaly. Trachea: No tracheal deviation. Pulmonary:      Effort: Pulmonary effort is normal. No respiratory distress. Breath sounds: No stridor. Musculoskeletal:      Cervical back: She exhibits tenderness. She exhibits no swelling and no edema. Skin:     General: Skin is warm and dry. Neurological:      Mental Status: She is alert and oriented to person, place, and time. Gait: Gait normal.   Psychiatric:         Attention and Perception: Attention and perception normal.         Behavior: Behavior normal.       Record/Diagnostics Review:    As above, I did review the imaging    Assessment:  1. Cervical radiculopathy    2. Other chronic pain    3. Postlaminectomy syndrome, cervical region        Treatment Plan:  DISCUSSION: Treatment options discussed with patient and all questions answered to patient's satisfaction. OARRS Review: Reviewed and acceptable for medications prescribed. TREATMENT OPTIONS:     Discussed the importance of continued physical therapy and exercise program as well.   Recent discharge from [address] [place] [address]. pain management for failed urine drug screen, was positive for fentanyl, no plans to take over opioid therapy in this scenario, I did discuss this with the patient. Try compounding cream to the affected area. Consider 2nd opinion at the [facility]. Continues to follow with orthopedic surgery at the [facility] as well. She is mainly looking opioid management, understands that would not part of my plan, she will f/u prn. Merlyn Radford M.D. I have reviewed the chief complaint and history of present illness (including ROS and PFSH) and vital documentation by my staff and I agree with their documentation and have added where applicable.

## 2020-02-21 RX ORDER — ESTRADIOL 1 MG/1
TABLET ORAL
Qty: 28 TABLET | Refills: 1 | Status: SHIPPED | OUTPATIENT
Start: 2020-02-21 | End: 2020-03-12

## 2020-03-12 RX ORDER — ESTRADIOL 1 MG/1
TABLET ORAL
Qty: 28 TABLET | Refills: 1 | Status: SHIPPED | OUTPATIENT
Start: 2020-03-12

## 2020-07-30 RX ORDER — FLUCONAZOLE 100 MG/1
100 TABLET ORAL DAILY
Qty: 7 TABLET | Refills: 0 | Status: SHIPPED | OUTPATIENT
Start: 2020-07-30 | End: 2020-08-06

## 2020-07-30 RX ORDER — METRONIDAZOLE 500 MG/1
500 TABLET ORAL 2 TIMES DAILY
Qty: 14 TABLET | Refills: 0 | Status: SHIPPED | OUTPATIENT
Start: 2020-07-30 | End: 2020-08-06

## 2020-08-21 ENCOUNTER — HOSPITAL ENCOUNTER (EMERGENCY)
Age: 55
Discharge: HOME | End: 2020-08-21
Payer: MEDICAID

## 2020-08-21 ENCOUNTER — APPOINTMENT (OUTPATIENT)
Dept: MRI IMAGING | Age: 55
End: 2020-08-21
Payer: MEDICAID

## 2020-08-21 VITALS
BODY MASS INDEX: 21.71 KG/M2 | HEART RATE: 95 BPM | TEMPERATURE: 98.1 F | SYSTOLIC BLOOD PRESSURE: 131 MMHG | RESPIRATION RATE: 16 BRPM | DIASTOLIC BLOOD PRESSURE: 85 MMHG | OXYGEN SATURATION: 97 % | WEIGHT: 115 LBS | HEIGHT: 61 IN

## 2020-08-21 LAB
ALBUMIN SERPL-MCNC: 3.7 G/DL (ref 3.5–4.6)
ALP BLD-CCNC: 76 U/L (ref 40–130)
ALT SERPL-CCNC: 8 U/L (ref 0–33)
ANION GAP SERPL CALCULATED.3IONS-SCNC: 11 MEQ/L (ref 9–15)
AST SERPL-CCNC: 18 U/L (ref 0–35)
BASOPHILS ABSOLUTE: 0 K/UL (ref 0–0.2)
BASOPHILS RELATIVE PERCENT: 0.5 %
BILIRUB SERPL-MCNC: 0.3 MG/DL (ref 0.2–0.7)
BUN BLDV-MCNC: 7 MG/DL (ref 6–20)
C-REACTIVE PROTEIN, HIGH SENSITIVITY: 39.8 MG/L (ref 0–5)
CALCIUM SERPL-MCNC: 9.1 MG/DL (ref 8.5–9.9)
CHLORIDE BLD-SCNC: 103 MEQ/L (ref 95–107)
CO2: 27 MEQ/L (ref 20–31)
CREAT SERPL-MCNC: 0.62 MG/DL (ref 0.5–0.9)
EOSINOPHILS ABSOLUTE: 0 K/UL (ref 0–0.7)
EOSINOPHILS RELATIVE PERCENT: 0.4 %
GFR AFRICAN AMERICAN: >60
GFR NON-AFRICAN AMERICAN: >60
GLOBULIN: 3.5 G/DL (ref 2.3–3.5)
GLUCOSE BLD-MCNC: 105 MG/DL (ref 70–99)
HCT VFR BLD CALC: 36.3 % (ref 37–47)
HEMOGLOBIN: 12.3 G/DL (ref 12–16)
LYMPHOCYTES ABSOLUTE: 1.6 K/UL (ref 1–4.8)
LYMPHOCYTES RELATIVE PERCENT: 29.8 %
MAGNESIUM: 1.8 MG/DL (ref 1.7–2.4)
MCH RBC QN AUTO: 32.8 PG (ref 27–31.3)
MCHC RBC AUTO-ENTMCNC: 33.8 % (ref 33–37)
MCV RBC AUTO: 97.1 FL (ref 82–100)
MONOCYTES ABSOLUTE: 0.3 K/UL (ref 0.2–0.8)
MONOCYTES RELATIVE PERCENT: 5.8 %
NEUTROPHILS ABSOLUTE: 3.5 K/UL (ref 1.4–6.5)
NEUTROPHILS RELATIVE PERCENT: 63.5 %
PDW BLD-RTO: 12.3 % (ref 11.5–14.5)
PLATELET # BLD: 235 K/UL (ref 130–400)
POTASSIUM SERPL-SCNC: 4.1 MEQ/L (ref 3.4–4.9)
RBC # BLD: 3.74 M/UL (ref 4.2–5.4)
SEDIMENTATION RATE, ERYTHROCYTE: 63 MM (ref 0–30)
SODIUM BLD-SCNC: 141 MEQ/L (ref 135–144)
TOTAL PROTEIN: 7.2 G/DL (ref 6.3–8)
WBC # BLD: 5.4 K/UL (ref 4.8–10.8)

## 2020-08-21 PROCEDURE — 96374 THER/PROPH/DIAG INJ IV PUSH: CPT

## 2020-08-21 PROCEDURE — 80053 COMPREHEN METABOLIC PANEL: CPT

## 2020-08-21 PROCEDURE — 85025 COMPLETE CBC W/AUTO DIFF WBC: CPT

## 2020-08-21 PROCEDURE — 85652 RBC SED RATE AUTOMATED: CPT

## 2020-08-21 PROCEDURE — 72141 MRI NECK SPINE W/O DYE: CPT

## 2020-08-21 PROCEDURE — 6360000002 HC RX W HCPCS: Performed by: PHYSICIAN ASSISTANT

## 2020-08-21 PROCEDURE — 86141 C-REACTIVE PROTEIN HS: CPT

## 2020-08-21 PROCEDURE — 96375 TX/PRO/DX INJ NEW DRUG ADDON: CPT

## 2020-08-21 PROCEDURE — 99284 EMERGENCY DEPT VISIT MOD MDM: CPT

## 2020-08-21 PROCEDURE — 83735 ASSAY OF MAGNESIUM: CPT

## 2020-08-21 PROCEDURE — 36415 COLL VENOUS BLD VENIPUNCTURE: CPT

## 2020-08-21 RX ORDER — LORAZEPAM 2 MG/ML
1 INJECTION INTRAMUSCULAR ONCE
Status: DISCONTINUED | OUTPATIENT
Start: 2020-08-21 | End: 2020-08-21

## 2020-08-21 RX ORDER — OXYCODONE HYDROCHLORIDE AND ACETAMINOPHEN 5; 325 MG/1; MG/1
1 TABLET ORAL EVERY 6 HOURS PRN
Qty: 12 TABLET | Refills: 0 | Status: SHIPPED | OUTPATIENT
Start: 2020-08-21 | End: 2020-08-24

## 2020-08-21 RX ORDER — LORAZEPAM 2 MG/ML
2 INJECTION INTRAMUSCULAR ONCE
Status: COMPLETED | OUTPATIENT
Start: 2020-08-21 | End: 2020-08-21

## 2020-08-21 RX ADMIN — HYDROMORPHONE HYDROCHLORIDE 1 MG: 1 INJECTION, SOLUTION INTRAMUSCULAR; INTRAVENOUS; SUBCUTANEOUS at 13:22

## 2020-08-21 RX ADMIN — LORAZEPAM 2 MG: 2 INJECTION INTRAMUSCULAR; INTRAVENOUS at 14:39

## 2020-08-21 ASSESSMENT — ENCOUNTER SYMPTOMS
SHORTNESS OF BREATH: 0
ABDOMINAL DISTENTION: 0
VOMITING: 0
PHOTOPHOBIA: 0
ANAL BLEEDING: 0
VOICE CHANGE: 0
COLOR CHANGE: 0
EYE DISCHARGE: 0
COUGH: 0
APNEA: 0

## 2020-08-21 ASSESSMENT — PAIN SCALES - GENERAL
PAINLEVEL_OUTOF10: 10
PAINLEVEL_OUTOF10: 8

## 2020-08-21 ASSESSMENT — PAIN DESCRIPTION - LOCATION
LOCATION: NECK

## 2020-08-21 ASSESSMENT — PAIN - FUNCTIONAL ASSESSMENT: PAIN_FUNCTIONAL_ASSESSMENT: 0-10

## 2020-08-21 ASSESSMENT — PAIN DESCRIPTION - PAIN TYPE
TYPE: CHRONIC PAIN
TYPE: ACUTE PAIN

## 2020-08-21 NOTE — ED NOTES
Pt up for discharge. Her  called while this RN was in the room. He is at [facility] on [facility]. This address given by this RN and he is on his way here to get patient. Pt states she is ok to go. Remains slightly groggy but Ox4.       Oral JEN Valdez  08/21/20 5630

## 2020-08-21 NOTE — ED NOTES
Bed: 18  Expected date: 8/21/20  Expected time:   Means of arrival:   Comments:  54(F) neck pain  From [facility] for possible spinal cord compression for MRI  VSS  Hx spinal fusion     Aileen Galvan RN  08/21/20 3020

## 2020-08-21 NOTE — ED NOTES
D/C instructions and rx x 1 given. Pt verbalized understanding. States pain is still about an 8/10. Has prescription in hand. Ambulated out slowly with my assist. Cab here.      Davon Gautam RN  08/21/20 0620

## 2020-08-21 NOTE — ED NOTES
Returned. States her pain is down to 7/10 at this time. Made aware we are waiting on results. Call light within reach. Lights turned off.       Rabon Ahumada, RN  08/21/20 7243

## 2020-08-21 NOTE — ED NOTES
To MRI via wheelchair. Denies any change in pain level. RHONA Felix aware.      Gaetano Gutierrez RN  08/21/20 7860

## 2020-08-21 NOTE — ED NOTES
Warm blanket placed around back/shoulders and another on top of patient. Call light within reach. Lights off for comfort. Pt stated while giving the Dilaudid, \"What do we do when this medicine doesn't work? \" Instructed patient we need to give it some time d/t this being a strong medicine.      Eveline Camilo RN  08/21/20 3485

## 2020-08-21 NOTE — ED NOTES
Went to MRI and medicated patient slowly. Pt states, \"That's not going to work either. \"  Instructed patient to have some positive thoughts that it's going to work. Reassurance given.       Sabra Simpson RN  08/21/20 3650

## 2020-08-21 NOTE — ED NOTES
Pt sleeping at this time. Resp even and unlabored. Pt had requested more pain meds but held at this time.      Madeline Patino RN  08/21/20 7516

## 2020-08-21 NOTE — ED PROVIDER NOTES
[address] [facility] ED  eMERGENCY dEPARTMENT eNCOUnter      Pt Name: Jeremi Landrum  MRN: [id number]  [place] 1965  Date of evaluation: 8/21/2020  Provider: Arpita Ruiz PA-C    CHIEF COMPLAINT       Chief Complaint   Patient presents with    Neck Pain     was at her pain managemnt appoitment at [facility] for chronic neck pain   they sent her here           HISTORY OF PRESENT ILLNESS   (Location/Symptom, Timing/Onset,Context/Setting, Quality, Duration, Modifying Factors, Severity)  Note limiting factors. Jeremi Landrum is a 47 y.o. female who presents to the emergency department patient presents from pain management appointment at [facility] for chronic neck pain which has worsened over the past month she also has hand pain and swelling which has moved up her arms for the past month she has history of rheumatoid arthritis sees rheumatology she has history of hardware anterior and posterior neck she has had MRIs of her neck before and has report from 2016. She denies any leg pain and swelling notes that that was present but he is taking Lasix at this time. Denies history of congestive heart failure. Symptoms moderate severity worse with touch or motion nothing improves her symptoms. Patient has multiple allergies including cortisone which she states either gives her a seizure stops her heart. HPI    NursingNotes were reviewed. REVIEW OF SYSTEMS    (2-9 systems for level 4, 10 or more for level 5)     Review of Systems   Constitutional: Negative for activity change, appetite change and unexpected weight change. HENT: Negative for ear discharge, nosebleeds and voice change. Eyes: Negative for photophobia and discharge. Respiratory: Negative for apnea, cough and shortness of breath. Cardiovascular: Negative for chest pain. Gastrointestinal: Negative for abdominal distention, anal bleeding and vomiting.    Endocrine: Negative for cold intolerance, heat intolerance and polyphagia. Genitourinary: Negative for dysuria and genital sores. Musculoskeletal: Positive for arthralgias, joint swelling, myalgias and neck pain. Skin: Negative for color change, pallor, rash and wound. Allergic/Immunologic: Negative for immunocompromised state. Neurological: Negative for seizures and facial asymmetry. Hematological: Does not bruise/bleed easily. Psychiatric/Behavioral: Negative for behavioral problems, self-injury and sleep disturbance. All other systems reviewed and are negative. Except as noted above the remainder of the review of systems was reviewed and negative. PAST MEDICAL HISTORY     Past Medical History:   Diagnosis Date    Anxiety     Asthma     Cervical pain (neck)     Depression     Epilepsy ([address]     History of herpes genitalis 4/10/2007    see lab work scanned    Hypothyroidism     Insomnia     Internal hemorrhoids     Irritable bowel syndrome     [facility] [address] [address]     Menarche @ 12 y/o    MVP (mitral valve prolapse)     Parity     G 4 P 3  -  3 c-sections,1 ectopic    Rheumatoid arthritis (HCC)     Seizures ([facility] [address] 2016    Tubulovillous adenoma     Vitamin D deficiency          SURGICALHISTORY       Past Surgical History:   Procedure Laterality Date    CARPAL TUNNEL RELEASE      both    CERVIX BIOPSY       SECTION       X 3    COLONOSCOPY  8/29/11    3/5/12    DILATION AND CURETTAGE  7/15/2010    D/T menorrhagia per Dr Hunter Stephenson  10/11/2010    laparoscopic supracervical hyst with ovarian  preservation bilaterally - per Dr Sukhdev Hogue  7/15/2010    with D&C per Dr Ernesto Pacheco replacement Left/   MUO    NECK SURGERY      disc removed  from neck  MUO    NERVE SURGERY      both arms nerve repair    OTHER SURGICAL HISTORY      buttock proc. due to inf.     SIGMOIDOSCOPY  16    TUBULOVILLOUS ADENOMA; flexible; diminutive polypectomy, sm. int. hemorrhoids    UPPER GASTROINTESTINAL ENDOSCOPY  8/29/11    X-RAY FOOT 3+VW           CURRENT MEDICATIONS       Previous Medications    CLONAZEPAM (KLONOPIN) 0.5 MG TABLET    0.5 mg. CLONIDINE (CATAPRES) 0.1 MG TABLET    0.1 mg    CYCLOBENZAPRINE (FLEXERIL) 10 MG TABLET    Take 1 tablet by mouth 3 times daily as needed for Muscle spasms    ESTRADIOL (ESTRACE VAGINAL) 0.1 MG/GM VAGINAL CREAM    Place 1 g vaginally daily    ESTRADIOL (ESTRACE VAGINAL) 0.1 MG/GM VAGINAL CREAM    Place 1 g vaginally See Admin Instructions I gram vaginally twice per week    ESTRADIOL (ESTRACE) 1 MG TABLET    take 1 tablet by mouth once daily    FOLIC ACID (FOLVITE) 1 MG TABLET    Take 1 mg by mouth daily. FUROSEMIDE (LASIX) 20 MG TABLET    20 mg    GABAPENTIN (NEURONTIN) 800 MG TABLET    Take 1 tablet by mouth three times daily. HYDROCHLOROTHIAZIDE (HYDRODIURIL) 25 MG TABLET    Take 25 mg by mouth daily. LAMOTRIGINE (LAMICTAL) 200 MG TABLET    Take 1 tablet by mouth 2 times daily     LEVOTHYROXINE (SYNTHROID) 25 MCG TABLET    25 mcg    LEVOTHYROXINE (SYNTHROID) 50 MCG TABLET    50 mcg    LEVOTHYROXINE (SYNTHROID) 75 MCG TABLET    Take 1 tablet by mouth daily. On empty stomach    METHOTREXATE (RHEUMATREX) 2.5 MG CHEMO TABLET    Take 12.5 mg by mouth once a week Indications: (Pt takes 5 tabs = 12.5 mg every Tuesday) (Pt takes 5 tabs = 12.5 mg every Tuesday)    ONDANSETRON (ZOFRAN) 4 MG TABLET    take 1 tablet by mouth every 8 hours    ORENCIA 125 MG/ML SOSY    Inject 1 Dose into the skin once a week Indications: every Tuesday every Tuesday    PANTOPRAZOLE (PROTONIX) 20 MG TABLET    Take 1 tablet by mouth daily. PYRIDOXINE HCL (VITAMIN B-6) 25 MG TABLET        RA VITAMIN B-12 TR 1000 MCG TBCR        RANITIDINE (ZANTAC) 150 MG TABLET        TIZANIDINE (ZANAFLEX) 4 MG TABLET    Take 4 mg by mouth every 8 hours as needed.       TOPIRAMATE (TOPAMAX) 50 MG TABLET    Take 1 tablet by mouth 2 times daily    VALACYCLOVIR (VALTREX) 500 MG TABLET    Take SCREENINGS      @FLOW([id number])@      PHYSICAL EXAM    (up to 7 for level 4, 8 or more for level 5)     ED Triage Vitals   BP Temp Temp src Pulse Resp SpO2 Height Weight   08/21/20 1242 08/21/20 1239 -- 08/21/20 1239 08/21/20 1239 08/21/20 1239 08/21/20 1330 08/21/20 1330   118/77 98.1 °F (36.7 °C)  74 14 99 % 5' 1\" (1.549 m) 115 lb (52.2 kg)       Physical Exam  Vitals signs and nursing note reviewed. Constitutional:       General: She is not in acute distress. Appearance: She is well-developed. She is not ill-appearing. HENT:      Head: Normocephalic and atraumatic. Right Ear: External ear normal.      Left Ear: External ear normal.      Nose: Nose normal.      Mouth/Throat:      Mouth: Mucous membranes are moist.      Pharynx: No oropharyngeal exudate or posterior oropharyngeal erythema. Eyes:      General:         Right eye: No discharge. Left eye: No discharge. Pupils: Pupils are equal, round, and reactive to light. Neck:      Musculoskeletal: Muscular tenderness present. Comments: Tender paracervical bilaterally palpable hardware present. Cardiovascular:      Rate and Rhythm: Normal rate and regular rhythm. Pulses: Normal pulses. Heart sounds: Normal heart sounds. Pulmonary:      Effort: Pulmonary effort is normal. No respiratory distress. Breath sounds: Normal breath sounds. No stridor. Abdominal:      General: Bowel sounds are normal. There is no distension. Palpations: Abdomen is soft. Tenderness: There is no abdominal tenderness. Musculoskeletal:         General: Swelling and tenderness present. Right lower leg: No edema. Left lower leg: No edema. Comments: Bilateral upper extremity peripheral edema noted negative lower extremity edema. Patient retains motion all 5 digits of both hands edema makes it difficult to move assess pain. Skin:     General: Skin is warm. Findings: No erythema.    Neurological: Mental Status: She is alert and oriented to person, place, and time. Sensory: Sensory deficit present. Deep Tendon Reflexes: Reflexes normal.      Comments: Sensory deficit left thumb only   Psychiatric:         Mood and Affect: Mood normal.         DIAGNOSTIC RESULTS     EKG: All EKG's are interpreted by the Emergency Department Physician who either signs or Co-signsthis chart in the absence of a cardiologist.         RADIOLOGY:   Arvilla Diss such as CT, Ultrasound and MRI are read by the radiologist. Plain radiographic images are visualized and preliminarily interpreted by the emergency physician with the below findings:    See rad report    Interpretation per the Radiologist below, if available at the time ofthis note:     [facility]   Final Result      Postsurgical and degenerative changes of the cervical spine as detailed. Moderate spinal canal stenosis at C4-5 and C5-6. No high-grade neuroforaminal stenosis identified. ED BEDSIDE ULTRASOUND:   Performed by ED Physician - none    LABS:  Labs Reviewed   COMPREHENSIVE METABOLIC PANEL - Abnormal; Notable for the following components:       Result Value    Glucose 105 (*)     All other components within normal limits   CBC WITH AUTO DIFFERENTIAL - Abnormal; Notable for the following components:    RBC 3.74 (*)     Hematocrit 36.3 (*)     MCH 32.8 (*)     All other components within normal limits   HIGH SENSITIVITY CRP - Abnormal; Notable for the following components:    CRP High Sensitivity 39.8 (*)     All other components within normal limits   SEDIMENTATION RATE - Abnormal; Notable for the following components:    Sed Rate 63 (*)     All other components within normal limits   MAGNESIUM       All other labs were within normal range or not returned as of this dictation.     EMERGENCY DEPARTMENT COURSE and DIFFERENTIAL DIAGNOSIS/MDM:   Vitals:    Vitals:    08/21/20 1330 08/21/20 1518 08/21/20 1550 08/21/20 1625 BP: 129/83 94/65 95/68 103/69   Pulse: 67 64 65 85   Resp: 14 14 14 16   Temp:       SpO2: 99% 97% 97% 98%   Weight: 115 lb (52.2 kg)      Height: 5' 1\" (1.549 m)               MDM  Number of Diagnoses or Management Options  Cervical radiculopathy:   History of rheumatoid arthritis:   Neck pain:   Diagnosis management comments: Pt sent from [facility] pain management for evaluation and treatment of worsening neck pain and hand /arm pain times one month.      +2 biceps reflexes  +2 triceps reflexes  Discussed with Dr. Freedman Sharron add ESR CRP as well as MRI without contrast  Discussed with Dr. Noemí Campbell radiologist he recommends beginning with an MRI without contrast of cervical spine he will review noncontrasted MRI and decide if her eye contrast is required. He will alert MRI tech of this when he contacts them to discuss case. Patient sleeping in room. Will hold additional pain medications. Final report moderate spinal canal stenosis no high-grade neural foraminal stenosis identified on MRI per radiology report. Discussed with Myla Ventura and will disposition home. Attempted to contact [facility] pain management. Will provide additional pain meds for 3 days. Pt to Follow up with pain management Monday. Contacted Savannah Yates triage nurse she discussed patient case with Delilah Taylor nurse practitioner and pain management she states that Ms. Krystle Chambers has given us permission to fill Percocet till Monday patient to follow-up Monday with pain management as well as her neurosurgeon Dr. Maren Whitt in [place]. Delilah Taylor nurse practitioner calls back at 5:00 once this to review to see spine MRI with neurosurgery  Discussed with Dr. Laurent Rondon and he reviewed MRI report. He states patient is to follow-up with her neurosurgeon. And pain management Monday. She has no acute process. But does have chronic cord compression.        Amount and/or Complexity of Data Reviewed  Clinical lab tests: reviewed and ordered  Tests in the radiology section of CPT®: ordered  Discuss the patient with other providers: yes        CRITICAL CARE TIME      CONSULTS:  None    PROCEDURES:  Unless otherwise noted below, none     Procedures    FINAL IMPRESSION      1. Cervical radiculopathy    2. History of rheumatoid arthritis    3. Neck pain          DISPOSITION/PLAN   DISPOSITION        PATIENT REFERRED TO:  Salvador Balderas, APRN - CNP  [address] [phone]    Call in 2 days      Your [facility] pain management doctor    Call in 2 days      Your Neurosurgeon. Call in 2 days      [facility]) ED  [address] [address]  [phone]    If symptoms worsen      DISCHARGE MEDICATIONS:  New Prescriptions    OXYCODONE-ACETAMINOPHEN (PERCOCET) 5-325 MG PER TABLET    Take 1 tablet by mouth every 6 hours as needed for Pain for up to 3 days. WARNING:  May cause drowsiness. May impair ability to operate vehicles or machinery. Do not use in combination with alcohol.           (Please note that portions of this note were completed with a voice recognition program.  Efforts were made to edit the dictations but occasionally words are mis-transcribed.)    Marla Monreal PA-C (electronically signed)  Attending Emergency Physician       Marla Monreal PA-C  08/21/20 8709

## 2020-09-16 ENCOUNTER — TELEPHONE (OUTPATIENT)
Dept: PAIN MANAGEMENT | Age: 55
End: 2020-09-16

## 2020-09-16 NOTE — TELEPHONE ENCOUNTER
Patient called stating she is returning a call to office for new patient appointment. Unable to schedule from referral, she is asking for the office to please contact her at earliest convenience. Phone number on file as been verified. Thank you.

## 2020-09-18 ENCOUNTER — INITIAL CONSULT (OUTPATIENT)
Dept: PAIN MANAGEMENT | Age: 55
End: 2020-09-18
Payer: COMMERCIAL

## 2020-09-18 VITALS
HEART RATE: 75 BPM | SYSTOLIC BLOOD PRESSURE: 114 MMHG | DIASTOLIC BLOOD PRESSURE: 75 MMHG | BODY MASS INDEX: 20.77 KG/M2 | TEMPERATURE: 97.1 F | OXYGEN SATURATION: 97 % | WEIGHT: 110 LBS | HEIGHT: 61 IN

## 2020-09-18 PROCEDURE — G8427 DOCREV CUR MEDS BY ELIG CLIN: HCPCS | Performed by: ANESTHESIOLOGY

## 2020-09-18 PROCEDURE — G8420 CALC BMI NORM PARAMETERS: HCPCS | Performed by: ANESTHESIOLOGY

## 2020-09-18 PROCEDURE — 99204 OFFICE O/P NEW MOD 45 MIN: CPT | Performed by: ANESTHESIOLOGY

## 2020-09-18 ASSESSMENT — ENCOUNTER SYMPTOMS
WHEEZING: 1
DIARRHEA: 1
BACK PAIN: 1

## 2020-09-18 NOTE — PROGRESS NOTES
years  No previous cervical spine interventional pain procedure  Pain score today  9  1. Location:neck pain  2. Radiation:bilateral arms  3. Character:numb, aching, throbbing, shooting, sharp, tender, burning  5. Duration:5 years  10. Onset: years  7. Did an injury cause pain: no  8. Aggravating factors:everything  9. Alleviating factors:nothing  10. Associated symptoms (numbness / tingling / weakness):  yes  -Where at:bilateral hands  -Down into finger tips or toes (specify which finger or toes):all fingers  -constant or intermitting: constant  11. Red Flags: (weight loss / chills / loss of bladder or bowel control):none    Previous management history  1. Previous diagnostic workup: (Imaging/EMG)   CT, MRI, or Xray: MRI, CTA, XRay  What part of the body:cervical, lumbar  What facility did they have it at:[facility]  What year or specific date: [id number]  EMG:  Not yet scheduled for 9/23/20    2. Previous non interventional treatments tried:  chiropractor or physical therapy: PT  What part of the body:NEck  What facility was it done at: [facility]  How long ago was it last tried:Month  Did it work: No  Did they complete it:Yes    3. Previous Medications tried  NSAID's: yes, allergic  Neurontin: yes  Lyrica: yes, allergic  Trycyclic antidepressant (Ellavil / Pamelor ): no  Cymbalta: yes, allergic  Opioids (Ultram / Vicodin / Percocet / Morphine / Dilaudid / Oramorph/ Fentanyl etc.):Percocet, Vicodin, Tramadol  Last Pain medication taken (name of med and date): Percocet a year ago. Tylenol last taken 9/17/20    4. Previous Interventional pain procedures tried:  What kind of injection:n/a  Who did the injection: n/a  did the injection help: n/a  Last time injection was done:N/A    5.  Previous surgeries for pain  What part of the body did they have the surgery:n/a  What physician did the surgery:n/a  What Facility did they have the surgery done:n/a  Date of Surgery:N/A    Social History:  Marital status:Single  Employment History:  Working  No  Full time Or Part time: n/a  Disability  Yes   Legal Issues related to pain complaint: No     Pain Disability Index score : 97    Informant: patient        Past Medical History:   Diagnosis Date    Anxiety     Asthma     Cervical pain (neck)     Depression     [facility] [address]     History of herpes genitalis 4/10/2007    see lab work scanned    Hypothyroidism     Insomnia     Internal hemorrhoids     Irritable bowel syndrome     Lupus ([facility] [facility]     Menarche @ 14 y/o    MVP (mitral valve prolapse)     Parity     G 4 P 3  -  3 c-sections,1 ectopic    Rheumatoid arthritis (HCC)     Seizures ([facility] [address] 2016    Tubulovillous adenoma     Vitamin D deficiency       Past Surgical History:   Procedure Laterality Date    CARPAL TUNNEL RELEASE      both    CERVIX BIOPSY       SECTION       X 3    COLONOSCOPY  8/29/11    3/5/12    DILATION AND CURETTAGE  7/15/2010    D/T menorrhagia per Dr Manisha Lopez  10/11/2010    laparoscopic supracervical hyst with ovarian  preservation bilaterally - per Dr Kobe Velázquez  7/15/2010    with D&C per Dr Dixon Kulwant replacement Left/   MUO    NECK SURGERY      disc removed  from neck  MUO    NERVE SURGERY      both arms nerve repair    OTHER SURGICAL HISTORY      buttock proc. due to inf.     SIGMOIDOSCOPY  16    TUBULOVILLOUS ADENOMA; flexible; diminutive polypectomy, sm. int. hemorrhoids    UPPER GASTROINTESTINAL ENDOSCOPY  11    X-RAY FOOT 3+VW       Social History     Socioeconomic History    Marital status: Single     Spouse name: None    Number of children: None    Years of education: None    Highest education level: None   Occupational History    None   Social Needs    Financial resource strain: None    Food insecurity     Worry: None     Inability: None    Transportation needs     Medical: None     Non-medical: None   Tobacco Use    Smoking status: Never Smoker  Smokeless tobacco: Never Used    Tobacco comment: x9yrs ago cigs. Substance and Sexual Activity    Alcohol use: No     Alcohol/week: 0.0 standard drinks     Comment: recovering alch. since 2000    Drug use: No    Sexual activity: Not Currently     Partners: Male   Lifestyle    Physical activity     Days per week: None     Minutes per session: None    Stress: None   Relationships    Social connections     Talks on phone: None     Gets together: None     Attends [employer] service: None     Active member of club or organization: None     Attends meetings of clubs or organizations: None     Relationship status: None    Intimate partner violence     Fear of current or ex partner: None     Emotionally abused: None     Physically abused: None     Forced sexual activity: None   Other Topics Concern    None   Social History Narrative    None     Family History   Problem Relation Age of Onset    High Blood Pressure Other     Heart Disease Other     Emphysema Other     COPD Other     Cancer Other         colon    Heart Disease Father     Hypertension Father     Cancer Father     Cancer Mother     Cancer Paternal Uncle     Cancer Maternal Grandmother     Cancer Maternal Grandfather      Allergies   Allergen Reactions    Latex     Aspirin     Cortisone     Dye [Iodides] Other (See Comments)     IV dye leaked into arm and caused local swelling in that area    Ibuprofen     Influenza Vaccines      On orencia, for RA    Penicillins      Latex; Aspirin; Cortisone; Dye [iodides]; Ibuprofen;  Influenza vaccines; and Penicillins   Vitals:    09/18/20 1001   BP: 114/75   Pulse: 75   Temp: 97.1 °F (36.2 °C)   SpO2: 97%     Current Outpatient Medications   Medication Sig Dispense Refill    estradiol (ESTRACE) 1 MG tablet take 1 tablet by mouth once daily 28 tablet 1    cloNIDine (CATAPRES) 0.1 MG tablet 0.1 mg      clonazePAM (KLONOPIN) 0.5 MG tablet 0.5 mg.      furosemide (LASIX) 20 MG tablet 20 mg site body pain involving multiple joint is likely related with her history of rheumatoid arthritis    With regard to opioid she has recently been [facility] and have been to numerous different pain clinics  I explained to her that I will not be able to take over her medication management    Recent MRI cervical spine is reviewed  Showed moderate cervical spinal stenosis  If no surgery is advised and she could probably benefit from cervical epidural steroid injection    Consultation note sent to the referring physician    Electronically signed by Bart Fox MD on 9/18/2020 at 10:48 AM

## 2020-12-03 ENCOUNTER — OFFICE VISIT (OUTPATIENT)
Dept: OBGYN CLINIC | Age: 55
End: 2020-12-03
Payer: COMMERCIAL

## 2020-12-03 ENCOUNTER — HOSPITAL ENCOUNTER (OUTPATIENT)
Age: 55
Setting detail: SPECIMEN
Discharge: HOME | End: 2020-12-03
Payer: COMMERCIAL

## 2020-12-03 VITALS
DIASTOLIC BLOOD PRESSURE: 82 MMHG | BODY MASS INDEX: 25.86 KG/M2 | WEIGHT: 137 LBS | HEART RATE: 84 BPM | HEIGHT: 61 IN | SYSTOLIC BLOOD PRESSURE: 126 MMHG

## 2020-12-03 PROCEDURE — G8419 CALC BMI OUT NRM PARAM NOF/U: HCPCS | Performed by: CLINICAL NURSE SPECIALIST

## 2020-12-03 PROCEDURE — 3017F COLORECTAL CA SCREEN DOC REV: CPT | Performed by: CLINICAL NURSE SPECIALIST

## 2020-12-03 PROCEDURE — 99213 OFFICE O/P EST LOW 20 MIN: CPT | Performed by: CLINICAL NURSE SPECIALIST

## 2020-12-03 PROCEDURE — G8484 FLU IMMUNIZE NO ADMIN: HCPCS | Performed by: CLINICAL NURSE SPECIALIST

## 2020-12-03 PROCEDURE — G8427 DOCREV CUR MEDS BY ELIG CLIN: HCPCS | Performed by: CLINICAL NURSE SPECIALIST

## 2020-12-03 PROCEDURE — 1036F TOBACCO NON-USER: CPT | Performed by: CLINICAL NURSE SPECIALIST

## 2020-12-03 RX ORDER — FLUCONAZOLE 100 MG/1
100 TABLET ORAL DAILY
Qty: 7 TABLET | Refills: 0 | Status: SHIPPED | OUTPATIENT
Start: 2020-12-03 | End: 2020-12-10

## 2020-12-03 RX ORDER — METRONIDAZOLE 500 MG/1
500 TABLET ORAL 2 TIMES DAILY
Qty: 14 TABLET | Refills: 0 | Status: SHIPPED | OUTPATIENT
Start: 2020-12-03 | End: 2020-12-10

## 2020-12-03 RX ORDER — VALACYCLOVIR HYDROCHLORIDE 500 MG/1
500 TABLET, FILM COATED ORAL 2 TIMES DAILY
Qty: 30 TABLET | Refills: 3 | Status: SHIPPED | OUTPATIENT
Start: 2020-12-03 | End: 2021-09-15

## 2020-12-03 ASSESSMENT — ENCOUNTER SYMPTOMS
RESPIRATORY NEGATIVE: 1
GASTROINTESTINAL NEGATIVE: 1
EYES NEGATIVE: 1
ALLERGIC/IMMUNOLOGIC NEGATIVE: 1

## 2020-12-03 NOTE — PROGRESS NOTES
 Tubulovillous adenoma     Vitamin D deficiency       Past Surgical History:   Procedure Laterality Date    CARPAL TUNNEL RELEASE      both    CERVIX BIOPSY       SECTION       X 3    COLONOSCOPY  8/29/11    3/5/12    DILATION AND CURETTAGE  7/15/2010    D/T menorrhagia per Dr Cony Isaacs  10/11/2010    laparoscopic supracervical hyst with ovarian  preservation bilaterally - per Dr Justo Honeycutt  7/15/2010    with D&C per Dr Edilberto Carmona replacement Left/   [facility]    NECK SURGERY      disc removed  from neck  [facility]    NERVE SURGERY      both arms nerve repair    OTHER SURGICAL HISTORY      buttock proc. due to inf.  SIGMOIDOSCOPY  16    TUBULOVILLOUS ADENOMA; flexible; diminutive polypectomy, sm. int. hemorrhoids    UPPER GASTROINTESTINAL ENDOSCOPY  11    X-RAY FOOT 3+VW        Social History     Socioeconomic History    Marital status: Single     Spouse name: None    Number of children: None    Years of education: None    Highest education level: None   Occupational History    None   Social Needs    Financial resource strain: None    Food insecurity     Worry: None     Inability: None    Transportation needs     Medical: None     Non-medical: None   Tobacco Use    Smoking status: Never Smoker    Smokeless tobacco: Never Used    Tobacco comment: x9yrs ago cigs. Substance and Sexual Activity    Alcohol use: No     Alcohol/week: 0.0 standard drinks     Comment: recovering alch.  since     Drug use: No    Sexual activity: Not Currently     Partners: Male   Lifestyle    Physical activity     Days per week: None     Minutes per session: None    Stress: None   Relationships    Social connections     Talks on phone: None     Gets together: None     Attends Gnosticist service: None     Active member of club or organization: None     Attends meetings of clubs or organizations: None     Relationship status: None    Intimate partner violence     Fear of current or ex partner: None     Emotionally abused: None     Physically abused: None     Forced sexual activity: None   Other Topics Concern    None   Social History Narrative    None      Current Outpatient Medications   Medication Sig Dispense Refill    valACYclovir (VALTREX) 500 MG tablet Take 1 tablet by mouth 2 times daily 30 tablet 3    fluconazole (DIFLUCAN) 100 MG tablet Take 1 tablet by mouth daily for 7 days 7 tablet 0    metroNIDAZOLE (FLAGYL) 500 MG tablet Take 1 tablet by mouth 2 times daily for 7 days 14 tablet 0    estradiol (ESTRACE) 1 MG tablet take 1 tablet by mouth once daily 28 tablet 1    cloNIDine (CATAPRES) 0.1 MG tablet 0.1 mg      clonazePAM (KLONOPIN) 0.5 MG tablet 0.5 mg.      furosemide (LASIX) 20 MG tablet 20 mg      levothyroxine (SYNTHROID) 25 MCG tablet 25 mcg      levothyroxine (SYNTHROID) 50 MCG tablet 50 mcg      cyclobenzaprine (FLEXERIL) 10 MG tablet Take 1 tablet by mouth 3 times daily as needed for Muscle spasms 12 tablet 0    estradiol (ESTRACE VAGINAL) 0.1 MG/GM vaginal cream Place 1 g vaginally daily 1 Tube 3    RA VITAMIN B-12 TR 1000 MCG TBCR   0    Pyridoxine HCl (VITAMIN B-6) 25 MG tablet   0    ondansetron (ZOFRAN) 4 MG tablet take 1 tablet by mouth every 8 hours  0    ranitidine (ZANTAC) 150 MG tablet   0    topiramate (TOPAMAX) 50 MG tablet Take 1 tablet by mouth 2 times daily (Patient taking differently: Take 100 mg by mouth 2 times daily ) 60 tablet 3    VENTOLIN  (90 BASE) MCG/ACT inhaler Inhale 2 puffs into the lungs every 4 hours as needed  0    ORENCIA 125 MG/ML SOSY Inject 1 Dose into the skin once a week Indications: every Tuesday every Tuesday  0    lamoTRIgine (LAMICTAL) 200 MG tablet Take 1 tablet by mouth 2 times daily   1    methotrexate (RHEUMATREX) 2.5 MG chemo tablet Take 12.5 mg by mouth once a week Indications: (Pt takes 5 tabs = 12.5 mg every Tuesday) (Pt takes 5 tabs = 12.5 mg every Tuesday) 0    gabapentin (NEURONTIN) 800 MG tablet Take 1 tablet by mouth three times daily.  pantoprazole (PROTONIX) 20 MG tablet Take 1 tablet by mouth daily.  hydrochlorothiazide (HYDRODIURIL) 25 MG tablet Take 25 mg by mouth daily.  levothyroxine (SYNTHROID) 75 MCG tablet Take 1 tablet by mouth daily. On empty stomach 30 tablet 5    tizanidine (ZANAFLEX) 4 MG tablet Take 4 mg by mouth every 8 hours as needed.  folic acid (FOLVITE) 1 MG tablet Take 1 mg by mouth daily.  valACYclovir (VALTREX) 500 MG tablet Take 1 tablet by mouth daily (Patient not taking: Reported on 12/3/2020) 30 tablet 2     No current facility-administered medications for this visit. Objective:   Physical Exam  Vitals signs reviewed. Constitutional:       Appearance: She is well-developed. HENT:      Head: Normocephalic and atraumatic. Eyes:      Conjunctiva/sclera: Conjunctivae normal.   Neck:      Musculoskeletal: Normal range of motion and neck supple. Cardiovascular:      Rate and Rhythm: Normal rate and regular rhythm. Pulmonary:      Effort: Pulmonary effort is normal.      Breath sounds: Normal breath sounds. Abdominal:      General: Bowel sounds are normal.   Musculoskeletal: Normal range of motion. Skin:     General: Skin is warm and dry. Neurological:      Mental Status: She is oriented to person, place, and time. Psychiatric:         Behavior: Behavior normal.         Thought Content: Thought content normal.         Judgment: Judgment normal.         Assessment:      Diagnosis Orders   1. Vaginal odor  VAGINITIS DNA PROBE   2. Acute vaginitis  fluconazole (DIFLUCAN) 100 MG tablet    metroNIDAZOLE (FLAGYL) 500 MG tablet           Plan:      Return for as needed. Patient was seen with total face to face time of 15 minutes. More than 50% of this visit was on counseling andeducation regarding the problems listed below and her options.  She was also counseled on her preventative health

## 2020-12-04 LAB
DIRECT EXAM: NORMAL
Lab: NORMAL
SPECIMEN DESCRIPTION: NORMAL

## 2021-01-16 ENCOUNTER — APPOINTMENT (OUTPATIENT)
Dept: GENERAL RADIOLOGY | Age: 56
End: 2021-01-16
Payer: COMMERCIAL

## 2021-01-16 ENCOUNTER — HOSPITAL ENCOUNTER (EMERGENCY)
Age: 56
Discharge: HOME | End: 2021-01-16
Attending: EMERGENCY MEDICINE
Payer: COMMERCIAL

## 2021-01-16 VITALS
BODY MASS INDEX: 24.17 KG/M2 | WEIGHT: 128 LBS | HEIGHT: 61 IN | HEART RATE: 80 BPM | SYSTOLIC BLOOD PRESSURE: 105 MMHG | TEMPERATURE: 97 F | OXYGEN SATURATION: 94 % | RESPIRATION RATE: 17 BRPM | DIASTOLIC BLOOD PRESSURE: 66 MMHG

## 2021-01-16 DIAGNOSIS — L03.116 CELLULITIS OF LEFT LOWER EXTREMITY: Primary | ICD-10-CM

## 2021-01-16 LAB
ABSOLUTE EOS #: 0.05 K/UL (ref 0–0.44)
ABSOLUTE IMMATURE GRANULOCYTE: 0.04 K/UL (ref 0–0.3)
ABSOLUTE LYMPH #: 1.78 K/UL (ref 1.1–3.7)
ABSOLUTE MONO #: 0.65 K/UL (ref 0.1–1.2)
ALBUMIN SERPL-MCNC: 3.5 G/DL (ref 3.5–5.2)
ALBUMIN/GLOBULIN RATIO: 1.1 (ref 1–2.5)
ALP BLD-CCNC: 73 U/L (ref 35–104)
ALT SERPL-CCNC: 7 U/L (ref 5–33)
ANION GAP SERPL CALCULATED.3IONS-SCNC: 10 MMOL/L (ref 9–17)
AST SERPL-CCNC: 15 U/L
BASOPHILS # BLD: 1 % (ref 0–2)
BASOPHILS ABSOLUTE: 0.04 K/UL (ref 0–0.2)
BILIRUB SERPL-MCNC: 0.55 MG/DL (ref 0.3–1.2)
BNP INTERPRETATION: NORMAL
BUN BLDV-MCNC: 16 MG/DL (ref 6–20)
BUN/CREAT BLD: ABNORMAL (ref 9–20)
CALCIUM SERPL-MCNC: 8.6 MG/DL (ref 8.6–10.4)
CHLORIDE BLD-SCNC: 106 MMOL/L (ref 98–107)
CO2: 22 MMOL/L (ref 20–31)
CREAT SERPL-MCNC: 0.49 MG/DL (ref 0.5–0.9)
DIFFERENTIAL TYPE: ABNORMAL
EOSINOPHILS RELATIVE PERCENT: 1 % (ref 1–4)
GFR AFRICAN AMERICAN: >60 ML/MIN
GFR NON-AFRICAN AMERICAN: >60 ML/MIN
GFR SERPL CREATININE-BSD FRML MDRD: ABNORMAL ML/MIN/{1.73_M2}
GFR SERPL CREATININE-BSD FRML MDRD: ABNORMAL ML/MIN/{1.73_M2}
GLUCOSE BLD-MCNC: 106 MG/DL (ref 70–99)
HCT VFR BLD CALC: 34.2 % (ref 36.3–47.1)
HEMOGLOBIN: 11.2 G/DL (ref 11.9–15.1)
IMMATURE GRANULOCYTES: 1 %
LYMPHOCYTES # BLD: 28 % (ref 24–43)
MCH RBC QN AUTO: 33.4 PG (ref 25.2–33.5)
MCHC RBC AUTO-ENTMCNC: 32.7 G/DL (ref 28.4–34.8)
MCV RBC AUTO: 102.1 FL (ref 82.6–102.9)
MONOCYTES # BLD: 10 % (ref 3–12)
NRBC AUTOMATED: 0 PER 100 WBC
PDW BLD-RTO: 13 % (ref 11.8–14.4)
PLATELET # BLD: 205 K/UL (ref 138–453)
PLATELET ESTIMATE: ABNORMAL
PMV BLD AUTO: 9.2 FL (ref 8.1–13.5)
POTASSIUM SERPL-SCNC: 3.7 MMOL/L (ref 3.7–5.3)
PRO-BNP: 90 PG/ML
RBC # BLD: 3.35 M/UL (ref 3.95–5.11)
RBC # BLD: ABNORMAL 10*6/UL
SEG NEUTROPHILS: 59 % (ref 36–65)
SEGMENTED NEUTROPHILS ABSOLUTE COUNT: 3.81 K/UL (ref 1.5–8.1)
SODIUM BLD-SCNC: 138 MMOL/L (ref 135–144)
TOTAL PROTEIN: 6.7 G/DL (ref 6.4–8.3)
TROPONIN INTERP: NORMAL
TROPONIN T: NORMAL NG/ML
TROPONIN, HIGH SENSITIVITY: <6 NG/L (ref 0–14)
TSH SERPL DL<=0.05 MIU/L-ACNC: 1.69 MIU/L (ref 0.3–5)
WBC # BLD: 6.4 K/UL (ref 3.5–11.3)
WBC # BLD: ABNORMAL 10*3/UL

## 2021-01-16 PROCEDURE — 84443 ASSAY THYROID STIM HORMONE: CPT

## 2021-01-16 PROCEDURE — 6370000000 HC RX 637 (ALT 250 FOR IP): Performed by: STUDENT IN AN ORGANIZED HEALTH CARE EDUCATION/TRAINING PROGRAM

## 2021-01-16 PROCEDURE — 6360000002 HC RX W HCPCS: Performed by: STUDENT IN AN ORGANIZED HEALTH CARE EDUCATION/TRAINING PROGRAM

## 2021-01-16 PROCEDURE — 85025 COMPLETE CBC W/AUTO DIFF WBC: CPT

## 2021-01-16 PROCEDURE — 80053 COMPREHEN METABOLIC PANEL: CPT

## 2021-01-16 PROCEDURE — 84484 ASSAY OF TROPONIN QUANT: CPT

## 2021-01-16 PROCEDURE — 93005 ELECTROCARDIOGRAM TRACING: CPT | Performed by: STUDENT IN AN ORGANIZED HEALTH CARE EDUCATION/TRAINING PROGRAM

## 2021-01-16 PROCEDURE — 99285 EMERGENCY DEPT VISIT HI MDM: CPT

## 2021-01-16 PROCEDURE — 73110 X-RAY EXAM OF WRIST: CPT

## 2021-01-16 PROCEDURE — 96365 THER/PROPH/DIAG IV INF INIT: CPT

## 2021-01-16 PROCEDURE — 83880 ASSAY OF NATRIURETIC PEPTIDE: CPT

## 2021-01-16 PROCEDURE — 2580000003 HC RX 258: Performed by: STUDENT IN AN ORGANIZED HEALTH CARE EDUCATION/TRAINING PROGRAM

## 2021-01-16 PROCEDURE — 93970 EXTREMITY STUDY: CPT

## 2021-01-16 RX ORDER — ACETAMINOPHEN 500 MG
1000 TABLET ORAL ONCE
Status: COMPLETED | OUTPATIENT
Start: 2021-01-16 | End: 2021-01-16

## 2021-01-16 RX ORDER — METHOCARBAMOL 750 MG/1
750 TABLET, FILM COATED ORAL 4 TIMES DAILY
Qty: 40 TABLET | Refills: 0 | Status: SHIPPED | OUTPATIENT
Start: 2021-01-16 | End: 2021-01-26

## 2021-01-16 RX ORDER — METHOCARBAMOL 500 MG/1
750 TABLET, FILM COATED ORAL ONCE
Status: COMPLETED | OUTPATIENT
Start: 2021-01-16 | End: 2021-01-16

## 2021-01-16 RX ORDER — CEPHALEXIN 500 MG/1
500 CAPSULE ORAL 4 TIMES DAILY
Qty: 40 CAPSULE | Refills: 0 | Status: SHIPPED | OUTPATIENT
Start: 2021-01-16 | End: 2021-01-26

## 2021-01-16 RX ORDER — OXYCODONE HYDROCHLORIDE 5 MG/1
5 TABLET ORAL EVERY 8 HOURS PRN
Qty: 5 TABLET | Refills: 0 | Status: SHIPPED | OUTPATIENT
Start: 2021-01-16 | End: 2021-01-19

## 2021-01-16 RX ADMIN — CEFTRIAXONE SODIUM 1 G: 1 INJECTION, POWDER, FOR SOLUTION INTRAMUSCULAR; INTRAVENOUS at 17:47

## 2021-01-16 RX ADMIN — ACETAMINOPHEN 1000 MG: 500 TABLET ORAL at 17:47

## 2021-01-16 RX ADMIN — METHOCARBAMOL 750 MG: 500 TABLET ORAL at 17:47

## 2021-01-16 ASSESSMENT — PAIN DESCRIPTION - ORIENTATION: ORIENTATION: RIGHT;LEFT

## 2021-01-16 ASSESSMENT — ENCOUNTER SYMPTOMS
NAUSEA: 0
RHINORRHEA: 0
VOMITING: 0
COLOR CHANGE: 1
SHORTNESS OF BREATH: 0
COUGH: 0
ABDOMINAL PAIN: 0

## 2021-01-16 ASSESSMENT — PAIN DESCRIPTION - LOCATION: LOCATION: LEG

## 2021-01-16 ASSESSMENT — PAIN DESCRIPTION - DESCRIPTORS: DESCRIPTORS: ACHING;BURNING;PRESSURE

## 2021-01-16 ASSESSMENT — PAIN SCALES - GENERAL: PAINLEVEL_OUTOF10: 10

## 2021-01-16 ASSESSMENT — PAIN DESCRIPTION - ONSET: ONSET: ON-GOING

## 2021-01-16 NOTE — ED NOTES
Pt to ED cc bilateral leg pain on and off for 6 months but worsening over the past couple days  Pt also has intermittent chest pain, and SOB  Pt is ambulatory but with pain  Pt placed on continuous cardiac monitoring, BP, Pulse ox. EKG obtained. IV established and labs drawn. Pt alert and oriented x4, talking in complete sentences, respirations even and unlabored. Pt acting age appropriate.  White board updated,         Stephanie Arceo RN  01/16/21 4294

## 2021-01-16 NOTE — ED PROVIDER NOTES
[facility] ED     Emergency Department     Faculty Attestation    I performed a history and physical examination of the patient and discussed management with the resident. I reviewed the residents note and agree with the documented findings and plan of care. Any areas of disagreement are noted on the chart. I was personally present for the key portions of any procedures. I have documented in the chart those procedures where I was not present during the key portions. I have reviewed the emergency nurses triage note. I agree with the chief complaint, past medical history, past surgical history, allergies, medications, social and family history as documented unless otherwise noted below. For Physician Assistant/ Nurse Practitioner cases/documentation I have personally evaluated this patient and have completed at least one if not all key elements of the E/M (history, physical exam, and MDM). Additional findings are as noted. Patient with history of rheumatoid arthritis on methadone track state and Aricept presents with increased pain to her left wrist as well as increased pain and swelling to her bilateral lower extremities with the left worse than the right. Patient denies any falls or injuries. She denies fever, nausea or vomiting. She says she does have intermittent chest pain and shortness of breath. On exam, patient is lying in the bed and appears mildly uncomfortable. Lungs are clear to auscultation bilaterally heart sounds are normal.  There is moderate tenderness to the left wrist.  There is also moderate edema to the bilateral lower legs. There is erythema to the left lower leg and feels warm compared to the right. We will get Dopplers. Will check labs. Will reassess.     EKG Interpretation    Interpreted by emergency department physician    Rhythm: normal sinus   Rate: normal  Axis: normal  Ectopy: none  Conduction: normal  ST Segments: normal  T Waves: non specific changes  Q Waves: none    Clinical Impression: non-specific EKG    Ally Saxena MD  Attending Emergency  Physician              Jey Connor MD  01/16/21 4637

## 2021-01-16 NOTE — ED PROVIDER NOTES
[facility]  Emergency Department Encounter  Emergency Medicine Resident     Pt Name: Namrata Fermin  MRN: [id number]  [place] 1965  Date of evaluation: 1/16/21  PCP:  [address]       Chief Complaint   Patient presents with    Knee Pain     bilateral [id number]    Leg Pain     bilateral x6 months       HISTORY OF PRESENT ILLNESS  (Location/Symptom, Timing/Onset, Context/Setting, Quality, Duration, Modifying Factors, Severity.)    Namrata Fermin is a 54 y.o. female who presents with bilateral lower extremity pain and bilateral wrist pain. Wrist pain patient states has been worsening over the past several months but the left wrist has been more painful recently. Patient endorses a history of rheumatoid arthritis for which she takes methotrexate and Orencia. States has been compliant with those medications and all other medications. States she is having some limited range of motion to her left wrist.  States she does have intact range of motion to her right wrist albeit it is painful. States she has constant pain to her hands but this has not changed. Also endorsing some bilateral lower leg swelling that is worse in the left than the right. States this has occurred in the past week does not normally have swelling to her lower legs. States that she has noticed a \"rash\" show up on the left lower leg within the past 36 hours. Denies any chest pain or shortness of breath. Denies any abdominal pain. Denies any fever or chills.     PPE Worn:  Gloves: Yes  Eye Protection: Goggles  Mask: Surgical Mask  Gown: NO    PAST MEDICAL / SURGICAL / SOCIAL / FAMILY HISTORY    has a past medical history of Anxiety, Asthma, Cervical pain (neck), Depression, Epilepsy ([address] History of herpes genitalis, Hypothyroidism, Insomnia, Internal hemorrhoids, Irritable bowel syndrome, Lupus ([address] Menarche, MVP (mitral valve prolapse), Parity, Rheumatoid arthritis ([address] Seizures  High Blood Pressure Other     Heart Disease Other     Emphysema Other     COPD Other     Cancer Other         colon    Heart Disease Father     Hypertension Father     Cancer Father     Cancer Mother     Cancer Paternal Uncle     Cancer Maternal Grandmother     Cancer Maternal Grandfather        Allergies:    Latex, Aspirin, Cortisone, Dye [iodides], Ibuprofen, Influenza vaccines, and Penicillins    Home Medications:  Prior to Admission medications    Medication Sig Start Date End Date Taking? Authorizing Provider   cephALEXin (KEFLEX) 500 MG capsule Take 1 capsule by mouth 4 times daily for 10 days 1/16/21 1/26/21 Yes Ashlyn Pickering MD   methocarbamol (ROBAXIN-750) 750 MG tablet Take 1 tablet by mouth 4 times daily for 10 days 1/16/21 1/26/21 Yes Ashlyn Pickering MD   oxyCODONE (ROXICODONE) 5 MG immediate release tablet Take 1 tablet by mouth every 8 hours as needed for Pain for up to 3 days. Intended supply: 3 days.  Take lowest dose possible to manage pain 1/16/21 1/19/21 Yes Ashlyn Pickering MD   valACYclovir (VALTREX) 500 MG tablet Take 1 tablet by mouth 2 times daily 12/3/20   MARNI Romano CNP   estradiol (ESTRACE) 1 MG tablet take 1 tablet by mouth once daily 3/12/20   MARNI Romano CNP   cloNIDine (CATAPRES) 0.1 MG tablet 0.1 mg 1/13/20   Historical Provider, MD   furosemide (LASIX) 20 MG tablet 20 mg 1/13/20   Historical Provider, MD   levothyroxine (SYNTHROID) 25 MCG tablet 25 mcg 11/27/19   Historical Provider, MD   levothyroxine (SYNTHROID) 50 MCG tablet 50 mcg 12/1/19   Historical Provider, MD   valACYclovir (VALTREX) 500 MG tablet Take 1 tablet by mouth daily  Patient not taking: Reported on 12/3/2020 10/10/18   Liborio Davila MD   estradiol (ESTRACE VAGINAL) 0.1 MG/GM vaginal cream Place 1 g vaginally daily 6/1/18   Liborio Davila MD   RA VITAMIN B-12 TR 1000 MCG TBCR  9/29/17   Historical Provider, MD   Pyridoxine HCl (VITAMIN B-6) 25 MG tablet  9/29/17 Historical Provider, MD   ranitidine (ZANTAC) 150 MG tablet  11/4/16   Historical Provider, MD   topiramate (TOPAMAX) 50 MG tablet Take 1 tablet by mouth 2 times daily  Patient taking differently: Take 100 mg by mouth 2 times daily  9/20/16   Liset Estrada MD   VENTOLIN  (90 BASE) MCG/ACT inhaler Inhale 2 puffs into the lungs every 4 hours as needed 8/23/16   Historical Provider, MD   Shola Candelario 125 MG/ML SOSY Inject 1 Dose into the skin once a week Indications: every Tuesday every Tuesday 10/16/15   Historical Provider, MD   lamoTRIgine (LAMICTAL) 200 MG tablet Take 1 tablet by mouth 2 times daily  10/19/15   Historical Provider, MD   methotrexate (RHEUMATREX) 2.5 MG chemo tablet Take 12.5 mg by mouth once a week Indications: (Pt takes 5 tabs = 12.5 mg every Tuesday) (Pt takes 5 tabs = 12.5 mg every Tuesday) 9/15/15   Historical Provider, MD   gabapentin (NEURONTIN) 800 MG tablet Take 1 tablet by mouth three times daily. 1/29/15   Historical Provider, MD   pantoprazole (PROTONIX) 20 MG tablet Take 1 tablet by mouth daily. 1/29/15   Historical Provider, MD   hydrochlorothiazide (HYDRODIURIL) 25 MG tablet Take 25 mg by mouth daily. 10/24/13   Historical Provider, MD   levothyroxine (SYNTHROID) 75 MCG tablet Take 1 tablet by mouth daily. On empty stomach 3/26/13   Heraclio Carbajal MD   folic acid (FOLVITE) 1 MG tablet Take 1 mg by mouth daily. Ad Stewart MD       REVIEW OF SYSTEMS    (2-9 systems for level 4, 10 or more for level 5)    Review of Systems   Constitutional: Negative for chills, fatigue and fever. HENT: Negative for congestion and rhinorrhea. Respiratory: Negative for cough and shortness of breath. Cardiovascular: Positive for leg swelling. Negative for chest pain. Gastrointestinal: Negative for abdominal pain, nausea and vomiting. Genitourinary: Negative for dysuria and flank pain. Musculoskeletal: Negative for myalgias.         Bilat wrist pain, L>R   Skin: Positive for color change. Neurological: Negative for headaches. All other systems reviewed and are negative. PHYSICAL EXAM   (up to 7 for level 4, 8 or more for level 5)    INITIAL VITALS:   ED Triage Vitals [01/16/21 1143]   BP Temp Temp Source Pulse Resp SpO2 Height Weight   124/77 97 °F (36.1 °C) Infrared 94 18 96 % 5' 1\" (1.549 m) 128 lb (58.1 kg)       Physical Exam  Vitals signs and nursing note reviewed. Constitutional:       General: She is not in acute distress. Appearance: She is well-developed. She is not ill-appearing. Cardiovascular:      Rate and Rhythm: Normal rate and regular rhythm. Pulses: Normal pulses. Radial pulses are 2+ on the right side and 2+ on the left side. Heart sounds: Normal heart sounds. No murmur. Pulmonary:      Effort: Pulmonary effort is normal. No tachypnea or respiratory distress. Breath sounds: Normal breath sounds. No decreased breath sounds or rales. Abdominal:      General: There is no distension. Palpations: Abdomen is soft. Tenderness: There is no abdominal tenderness. Musculoskeletal:         General: Tenderness present. Right lower leg: Edema present. Left lower leg: Edema present. Comments: Pitting edema worse on the left lower leg and on the right lower leg. Diffuse erythema throughout the lower left leg but is not present on the right. Tenderness to palpation throughout both legs worse on the left than on the right   Skin:     General: Skin is warm and dry. Capillary Refill: Capillary refill takes less than 2 seconds. Neurological:      Mental Status: She is alert and oriented to person, place, and time.          DIFFERENTIAL  DIAGNOSIS   PLAN (LABS / IMAGING / EKG):  Orders Placed This Encounter   Procedures    XR WRIST LEFT (MIN 3 VIEWS)    VL DUP LOWER EXTREMITY VENOUS BILATERAL    CBC Auto Differential    COMPREHENSIVE METABOLIC PANEL    Troponin    Brain Natriuretic Peptide    TSH with joint and radiocarpal interval.  Erosive changes and cortical deformity of the 1st and 3rd metacarpophalangeal joints. Overlying soft tissue edema. Chronic deformity of the carpus with bony fusion of the lunate, capitate, and hamate. Diffuse overlying soft tissue edema. No acute fracture or dislocation. Impression:  Bilateral wrist pain that is worsened recently. Left greater than right. Left wrist is swollen but is not present on the right. Limited range of motion testing of the left wrist secondary to pain. Given patient's history of rheumatoid arthritis, possible RA flare however also has a history of osteopenia so there is a concern for occult fracture as well. Will check x-ray of the left wrist.  Bilateral lower legs swollen, pitting edema to both legs that is worse on the left than on the right. Erythema also present throughout the left lower leg it is concerning for cellulitis but not definitive. Both methotrexate and Orencia known dermatologic side effects which could be causing the rash. Also concern for DVT so we will get a DVT scan. Otherwise will check CBC primarily looking for leukocytosis or anemia, CMP check for hepatic dysfunction, electrolyte disturbance, and renal function, troponin and BNP check for status of fluid overload and cardiac status. EMERGENCY DEPARTMENT COURSE:  ED Course as of Jan 16 1830   Sat Jan 16, 2021 1825 DVT scan negative. Higher concern down for cellulitis. Started patient on ceftriaxone in the emergency room, 1 g.  Will discharge on Keflex. Will give short course of Roxicodone, Tylenol, Robaxin.    [AP]      ED Course User Index  [AP] Neva Dumont MD     As above, DVT scan was negative. Patient started on ceftriaxone. Discharged on Keflex. Pain control in the ER with Tylenol and Robaxin, will continue as an outpatient. Patient to follow-up with her rheumatologist, states that she is an appointment this coming Tuesday.   Patient to continue on the Keflex until that time. There is slight concern that patient's rash and swelling could be secondary to patient's methotrexate or Orencia and if so, the rheumatologist should be able to differentiate between the 2. Patient will be on Keflex until that time and if the swelling and rash is improved, more likely secondary to cellulitis. Patient agreeable with this plan. I will give a short course of oxycodone as patient does appear to be in a fair amount of pain. Since patient has to drive home however, will not give her oxycodone in the ER. MDM  Number of Diagnoses or Management Options  Cellulitis of left lower extremity: new, needed workup     Amount and/or Complexity of Data Reviewed  Clinical lab tests: ordered and reviewed  Tests in the radiology section of CPT®: ordered and reviewed  Review and summarize past medical records: yes  Discuss the patient with other providers: yes  Independent visualization of images, tracings, or specimens: yes    Risk of Complications, Morbidity, and/or Mortality  Presenting problems: moderate  Diagnostic procedures: moderate  Management options: low    Patient Progress  Patient progress: stable      PROCEDURES:  none    CONSULTS:  None    CRITICAL CARE:  Please see attending note    FINAL IMPRESSION     1. Cellulitis of left lower extremity          DISPOSITION / PLAN   DISPOSITION Decision To Discharge 01/16/2021 06:18:17 PM      Evaluation and treatment course in the ED, and plan of care upon discharge was discussed in length with the patient. Patient had no further questions prior to being discharged and was instructed to return to the ED for new or worsening symptoms. Any changes to existing medications or new prescriptions were reviewed with patient and they expressed understanding of how to correctly take their medications and the possible side effects.     PATIENT REFERRED TO:  Paty COOPER [address]. [address]  [phone]    Schedule an appointment as soon as possible for a visit in 1 week      Dipesh Rivera MD  [address] [facility],[address] [facility]    Go to         Luisa[address]ek [address]:  New Prescriptions    CEPHALEXIN (KEFLEX) 500 MG CAPSULE    Take 1 capsule by mouth 4 times daily for 10 days    METHOCARBAMOL (ROBAXIN-750) 750 MG TABLET    Take 1 tablet by mouth 4 times daily for 10 days    OXYCODONE (ROXICODONE) 5 MG IMMEDIATE RELEASE TABLET    Take 1 tablet by mouth every 8 hours as needed for Pain for up to 3 days. Intended supply: 3 days.  Take lowest dose possible to manage pain       Shahnaz Field DO  Emergency Medicine Resident Physician, PGY-3    (Please note that portions of this note were completed with a voice recognition program.  Efforts were made to edit the dictations but occasionally words are mis-transcribed.)         Shahnaz Field MD  01/16/21 4327

## 2021-01-18 LAB
EKG ATRIAL RATE: 88 BPM
EKG P AXIS: 70 DEGREES
EKG P-R INTERVAL: 156 MS
EKG Q-T INTERVAL: 340 MS
EKG QRS DURATION: 88 MS
EKG QTC CALCULATION (BAZETT): 411 MS
EKG R AXIS: -29 DEGREES
EKG T AXIS: 12 DEGREES
EKG VENTRICULAR RATE: 88 BPM

## 2021-01-18 PROCEDURE — 93010 ELECTROCARDIOGRAM REPORT: CPT | Performed by: INTERNAL MEDICINE

## 2021-03-25 ENCOUNTER — TELEPHONE (OUTPATIENT)
Dept: OBGYN CLINIC | Age: 56
End: 2021-03-25

## 2021-03-25 RX ORDER — METRONIDAZOLE 500 MG/1
500 TABLET ORAL 2 TIMES DAILY
Qty: 14 TABLET | Refills: 0 | Status: SHIPPED | OUTPATIENT
Start: 2021-03-25 | End: 2021-04-01

## 2021-03-25 RX ORDER — FLUCONAZOLE 100 MG/1
100 TABLET ORAL DAILY
Qty: 7 TABLET | Refills: 0 | Status: SHIPPED | OUTPATIENT
Start: 2021-03-25 | End: 2021-04-01

## 2021-03-29 ENCOUNTER — OFFICE VISIT (OUTPATIENT)
Dept: OBGYN CLINIC | Age: 56
End: 2021-03-29
Payer: COMMERCIAL

## 2021-03-29 VITALS
HEIGHT: 61 IN | SYSTOLIC BLOOD PRESSURE: 115 MMHG | BODY MASS INDEX: 28.32 KG/M2 | DIASTOLIC BLOOD PRESSURE: 70 MMHG | WEIGHT: 150 LBS | TEMPERATURE: 97.2 F

## 2021-03-29 DIAGNOSIS — R35.0 FREQUENCY OF URINATION: ICD-10-CM

## 2021-03-29 DIAGNOSIS — N76.0 ACUTE VAGINITIS: Primary | ICD-10-CM

## 2021-03-29 DIAGNOSIS — Z13.31 POSITIVE DEPRESSION SCREENING: ICD-10-CM

## 2021-03-29 LAB
BILIRUBIN, POC: NORMAL
BLOOD URINE, POC: NORMAL
CLARITY, POC: CLEAR
COLOR, POC: YELLOW
GLUCOSE URINE, POC: NORMAL
KETONES, POC: NORMAL
LEUKOCYTE EST, POC: NORMAL
NITRITE, POC: NORMAL
PH, POC: 6
PROTEIN, POC: NORMAL
SPECIFIC GRAVITY, POC: 1.01
UROBILINOGEN, POC: NORMAL

## 2021-03-29 PROCEDURE — 3017F COLORECTAL CA SCREEN DOC REV: CPT | Performed by: SPECIALIST

## 2021-03-29 PROCEDURE — G8419 CALC BMI OUT NRM PARAM NOF/U: HCPCS | Performed by: SPECIALIST

## 2021-03-29 PROCEDURE — 99213 OFFICE O/P EST LOW 20 MIN: CPT | Performed by: SPECIALIST

## 2021-03-29 PROCEDURE — G8431 POS CLIN DEPRES SCRN F/U DOC: HCPCS | Performed by: SPECIALIST

## 2021-03-29 PROCEDURE — 1036F TOBACCO NON-USER: CPT | Performed by: SPECIALIST

## 2021-03-29 PROCEDURE — 81003 URINALYSIS AUTO W/O SCOPE: CPT | Performed by: SPECIALIST

## 2021-03-29 PROCEDURE — G8484 FLU IMMUNIZE NO ADMIN: HCPCS | Performed by: SPECIALIST

## 2021-03-29 PROCEDURE — G8427 DOCREV CUR MEDS BY ELIG CLIN: HCPCS | Performed by: SPECIALIST

## 2021-03-29 RX ORDER — FLUCONAZOLE 100 MG/1
100 TABLET ORAL DAILY
Qty: 7 TABLET | Refills: 0 | Status: SHIPPED | OUTPATIENT
Start: 2021-03-29 | End: 2021-04-05

## 2021-03-29 RX ORDER — METRONIDAZOLE 500 MG/1
500 TABLET ORAL 2 TIMES DAILY
Qty: 14 TABLET | Refills: 0 | Status: SHIPPED | OUTPATIENT
Start: 2021-03-29 | End: 2021-04-05

## 2021-03-29 ASSESSMENT — PATIENT HEALTH QUESTIONNAIRE - PHQ9
6. FEELING BAD ABOUT YOURSELF - OR THAT YOU ARE A FAILURE OR HAVE LET YOURSELF OR YOUR FAMILY DOWN: 2
9. THOUGHTS THAT YOU WOULD BE BETTER OFF DEAD, OR OF HURTING YOURSELF: 0
SUM OF ALL RESPONSES TO PHQ9 QUESTIONS 1 & 2: 6
4. FEELING TIRED OR HAVING LITTLE ENERGY: 3
SUM OF ALL RESPONSES TO PHQ QUESTIONS 1-9: 23

## 2021-03-29 ASSESSMENT — ENCOUNTER SYMPTOMS
CONSTIPATION: 0
VOMITING: 0
DIARRHEA: 0
COUGH: 0
EYE PAIN: 0
ABDOMINAL PAIN: 0
APNEA: 0
ABDOMINAL DISTENTION: 0
NAUSEA: 0

## 2021-03-29 ASSESSMENT — COLUMBIA-SUICIDE SEVERITY RATING SCALE - C-SSRS: 1. WITHIN THE PAST MONTH, HAVE YOU WISHED YOU WERE DEAD OR WISHED YOU COULD GO TO SLEEP AND NOT WAKE UP?: NO

## 2021-03-29 NOTE — PROGRESS NOTES
Subjective:      Patient ID: Lois Ortiz is a 54 y.o. female. Chief Complaint   Patient presents with    Vaginal Discharge     white - started about a week ago      /70 (Site: Left Upper Arm, Position: Sitting, Cuff Size: Medium Adult)   Temp 97.2 °F (36.2 °C) (Temporal)   Ht 5' 1\" (1.549 m)   Wt 150 lb (68 kg)   LMP 08/05/2007 (Within Years)   BMI 28.34 kg/m²   Patient's last menstrual period was 08/05/2007 (within years).     [id number]    Past Medical History:   Diagnosis Date    Anxiety     Asthma     Cervical pain (neck)     Depression     Epilepsy ([address]     History of herpes genitalis 4/10/2007    see lab work scanned    Hypothyroidism     Insomnia     Internal hemorrhoids     Irritable bowel syndrome     [facility]     Menarche @ 14 y/o    MVP (mitral valve prolapse)     Parity     G 4 P 3  -  3 c-sections,1 ectopic    Rheumatoid arthritis ([facility]     Seizures ([facility] [address] 9/20/2016    Tubulovillous adenoma     Vitamin D deficiency      Current Outpatient Medications Ordered in Epic   Medication Sig Dispense Refill    metroNIDAZOLE (FLAGYL) 500 MG tablet Take 1 tablet by mouth 2 times daily for 7 days 14 tablet 0    fluconazole (DIFLUCAN) 100 MG tablet Take 1 tablet by mouth daily for 7 days 7 tablet 0    valACYclovir (VALTREX) 500 MG tablet Take 1 tablet by mouth 2 times daily 30 tablet 3    estradiol (ESTRACE) 1 MG tablet take 1 tablet by mouth once daily 28 tablet 1    cloNIDine (CATAPRES) 0.1 MG tablet 0.1 mg      furosemide (LASIX) 20 MG tablet 20 mg      levothyroxine (SYNTHROID) 25 MCG tablet 25 mcg      levothyroxine (SYNTHROID) 50 MCG tablet 50 mcg      valACYclovir (VALTREX) 500 MG tablet Take 1 tablet by mouth daily 30 tablet 2    estradiol (ESTRACE VAGINAL) 0.1 MG/GM vaginal cream Place 1 g vaginally daily 1 Tube 3    ranitidine (ZANTAC) 150 MG tablet   0    topiramate (TOPAMAX) 50 MG tablet Take 1 tablet by mouth 2 times daily (Patient taking differently: Take 100 mg by mouth 2 times daily ) 60 tablet 3    VENTOLIN  (90 BASE) MCG/ACT inhaler Inhale 2 puffs into the lungs every 4 hours as needed  0    lamoTRIgine (LAMICTAL) 200 MG tablet Take 1 tablet by mouth 2 times daily   1    methotrexate (RHEUMATREX) 2.5 MG chemo tablet Take 12.5 mg by mouth once a week Indications: (Pt takes 5 tabs = 12.5 mg every Tuesday) (Pt takes 5 tabs = 12.5 mg every Tuesday)  0    gabapentin (NEURONTIN) 800 MG tablet Take 1 tablet by mouth three times daily.  pantoprazole (PROTONIX) 20 MG tablet Take 1 tablet by mouth daily.  hydrochlorothiazide (HYDRODIURIL) 25 MG tablet Take 25 mg by mouth daily.  levothyroxine (SYNTHROID) 75 MCG tablet Take 1 tablet by mouth daily. On empty stomach 30 tablet 5    folic acid (FOLVITE) 1 MG tablet Take 1 mg by mouth daily.  metroNIDAZOLE (FLAGYL) 500 MG tablet Take 1 tablet by mouth 2 times daily for 7 days (Patient not taking: Reported on 3/29/2021) 14 tablet 0    fluconazole (DIFLUCAN) 100 MG tablet Take 1 tablet by mouth daily for 7 days (Patient not taking: Reported on 3/29/2021) 7 tablet 0    RA VITAMIN B-12 TR 1000 MCG TBCR   0    Pyridoxine HCl (VITAMIN B-6) 25 MG tablet   0    ORENCIA 125 MG/ML SOSY Inject 1 Dose into the skin once a week Indications: every Tuesday every Tuesday  0     No current [facility]-ordered facility-administered medications on file.       Problem List Items Addressed This Visit     Vaginitis - Primary      Other Visit Diagnoses     Positive depression screening        Frequency of urination        Relevant Orders    POCT Urinalysis No Micro (Auto) (Completed)        Allergies   Allergen Reactions    Latex     Aspirin     Cortisone     Dye [Iodides] Other (See Comments)     IV dye leaked into arm and caused local swelling in that area    Ibuprofen     Influenza Vaccines      On orencia, for RA    Penicillins      Orders Placed This Encounter   Procedures    POCT Urinalysis No Micro (Auto) Patient is here today complaining of a white vaginal discharge. The discharge started about a week ago. She also has an odor when she pees. She has the same partner for the last 8 years. She has not had sex in 4 months and when she does have sex, it kristen      Review of Systems   Constitutional: Negative for activity change, appetite change and fever. HENT: Negative for ear discharge and ear pain. Eyes: Negative for pain and visual disturbance. Respiratory: Negative for apnea and cough. Cardiovascular: Negative for chest pain, palpitations and leg swelling. Gastrointestinal: Negative for abdominal distention, abdominal pain, constipation, diarrhea, nausea and vomiting. Endocrine: Negative. Genitourinary: Positive for dyspareunia, vaginal discharge and vaginal pain. Negative for difficulty urinating, dysuria, menstrual problem and pelvic pain. Musculoskeletal: Negative for neck pain and neck stiffness. Skin: Negative. Neurological: Negative for light-headedness and numbness. Hematological: Negative. Does not bruise/bleed easily. Objective:   Physical Exam  Vitals signs and nursing note reviewed. Exam conducted with a chaperone present. Constitutional:       Appearance: She is well-developed. HENT:      Head: Normocephalic and atraumatic. Neck:      Musculoskeletal: Normal range of motion and neck supple. Thyroid: No thyromegaly. Cardiovascular:      Rate and Rhythm: Normal rate and regular rhythm. Pulmonary:      Effort: Pulmonary effort is normal.      Breath sounds: Normal breath sounds. No wheezing. Abdominal:      General: Bowel sounds are normal. There is no distension. Palpations: Abdomen is soft. There is no mass. Tenderness: There is no abdominal tenderness. There is no guarding. Genitourinary:     Vagina: Vaginal discharge present. Musculoskeletal: Normal range of motion. Right lower leg: Edema present.       Left lower leg: Edema present. Skin:     General: Skin is dry. Neurological:      Mental Status: She is alert and oriented to person, place, and time. Psychiatric:         Behavior: Behavior normal.         Thought Content: Thought content normal.         Assessment:      Patient with vaginitis. Will treat with Flagyl and Diflucan. Urinalysis in office today is negative for UTI. Plan:      Orders Placed This Encounter   Procedures    POCT Urinalysis No Micro (Auto)     Orders Placed This Encounter   Medications    metroNIDAZOLE (FLAGYL) 500 MG tablet     Sig: Take 1 tablet by mouth 2 times daily for 7 days     Dispense:  14 tablet     Refill:  0    fluconazole (DIFLUCAN) 100 MG tablet     Sig: Take 1 tablet by mouth daily for 7 days     Dispense:  7 tablet     Refill:  0      Appointment for annual exam.    I, Albert Barajas, am scribing for, and in the presence of Dr. Yaw Mayes. Electronically signed by: Albert Barajas 3/29/21 11:05 AM       I agree to the above documentation placed by my scribe Albert Baarjas. I reviewed the scribe's note and agree with the documented findings and plan of care. Any areas of disagreement are noted on the chart. I have personally evaluated this patient. Additional findings are as noted. I agree with the chief complaint, past medical history, past surgical history, allergies, medications, social and family history as documented unless otherwise noted below.      Electronically signed by Yaw Mayes MD on 3/30/2021 at 2:03 AM

## 2021-06-11 ENCOUNTER — TELEPHONE (OUTPATIENT)
Dept: OBGYN CLINIC | Age: 56
End: 2021-06-11

## 2021-06-11 RX ORDER — FLUCONAZOLE 100 MG/1
100 TABLET ORAL DAILY
Qty: 7 TABLET | Refills: 0 | Status: SHIPPED | OUTPATIENT
Start: 2021-06-11 | End: 2021-06-18

## 2021-06-11 RX ORDER — METRONIDAZOLE 500 MG/1
500 TABLET ORAL 2 TIMES DAILY
Qty: 14 TABLET | Refills: 0 | Status: SHIPPED | OUTPATIENT
Start: 2021-06-11 | End: 2021-06-18

## 2021-06-24 ENCOUNTER — HOSPITAL ENCOUNTER (EMERGENCY)
Age: 56
Discharge: HOME | End: 2021-06-24
Attending: EMERGENCY MEDICINE
Payer: COMMERCIAL

## 2021-06-24 VITALS
BODY MASS INDEX: 24.75 KG/M2 | OXYGEN SATURATION: 98 % | SYSTOLIC BLOOD PRESSURE: 124 MMHG | DIASTOLIC BLOOD PRESSURE: 68 MMHG | HEART RATE: 80 BPM | WEIGHT: 131 LBS | RESPIRATION RATE: 16 BRPM | TEMPERATURE: 98.6 F

## 2021-06-24 DIAGNOSIS — L03.115 CELLULITIS OF RIGHT LEG: ICD-10-CM

## 2021-06-24 DIAGNOSIS — M54.2 NECK PAIN: Primary | ICD-10-CM

## 2021-06-24 PROCEDURE — 99283 EMERGENCY DEPT VISIT LOW MDM: CPT

## 2021-06-24 PROCEDURE — 6370000000 HC RX 637 (ALT 250 FOR IP): Performed by: EMERGENCY MEDICINE

## 2021-06-24 RX ORDER — DOXYCYCLINE HYCLATE 100 MG
100 TABLET ORAL 2 TIMES DAILY
Qty: 20 TABLET | Refills: 0 | Status: SHIPPED | OUTPATIENT
Start: 2021-06-24 | End: 2021-07-04

## 2021-06-24 RX ORDER — DOXYCYCLINE 100 MG/1
100 CAPSULE ORAL ONCE
Status: COMPLETED | OUTPATIENT
Start: 2021-06-24 | End: 2021-06-24

## 2021-06-24 RX ORDER — HYDROCODONE BITARTRATE AND ACETAMINOPHEN 5; 325 MG/1; MG/1
1 TABLET ORAL EVERY 8 HOURS PRN
Qty: 9 TABLET | Refills: 0 | Status: SHIPPED | OUTPATIENT
Start: 2021-06-24 | End: 2021-06-27

## 2021-06-24 RX ORDER — CYCLOBENZAPRINE HCL 10 MG
10 TABLET ORAL NIGHTLY PRN
Qty: 10 TABLET | Refills: 0 | Status: SHIPPED | OUTPATIENT
Start: 2021-06-24 | End: 2021-07-09

## 2021-06-24 RX ADMIN — DOXYCYCLINE 100 MG: 100 CAPSULE ORAL at 14:05

## 2021-06-24 ASSESSMENT — ENCOUNTER SYMPTOMS
FACIAL SWELLING: 0
ABDOMINAL PAIN: 0
EYE DISCHARGE: 0
EYE PAIN: 0
ABDOMINAL DISTENTION: 0
BACK PAIN: 0
CHEST TIGHTNESS: 0
SHORTNESS OF BREATH: 0

## 2021-06-24 ASSESSMENT — PAIN SCALES - GENERAL: PAINLEVEL_OUTOF10: 5

## 2021-06-24 NOTE — ED NOTES
Pt to er with c/o red itchy rash. Pt a&ox3. Skin warm and dry. Respirations even and non-labored.       Mehdi Maxwell RN  06/24/21 [id number]

## 2021-06-24 NOTE — ED PROVIDER NOTES
EMERGENCY DEPARTMENT ENCOUNTER    Pt Name: Feli Gan  MRN: [id number]  [place] 1965  Date of evaluation: 6/24/21  CHIEF COMPLAINT       Chief Complaint   Patient presents with    Neck Pain    Rash     HISTORY OF PRESENT ILLNESS   HPI  The patient is a 59-year-old female who presented to the emergency department secondary to rash as well as neck pain. Patient noted a rash on her bilateral legs 3 days ago she denied any new soaps lotions or medications but noticed increased redness of the right leg. No exposure to bedbugs or scabies. Denied a previous history of diabetes. Patient also complaining of neck pain for which she has chronic neck pain under care of an orthopedic surgeon she has had multiple surgeries on her neck. She denied any new trauma or injury. She complained of pain localized to the back of her neck 5 out of 10 on the pain scale. She denied numbness or tingling or difficulty ambulating. She denied back pain. She denied loss of bladder bowel or urinary retention. Patient had chest pain, shortness of breath, nausea, vomiting, fevers or chills. REVIEW OF SYSTEMS     Review of Systems   Constitutional: Negative for chills, diaphoresis and fever. HENT: Negative for congestion, ear pain and facial swelling. Eyes: Negative for pain, discharge and visual disturbance. Respiratory: Negative for chest tightness and shortness of breath. Cardiovascular: Negative for chest pain and palpitations. Gastrointestinal: Negative for abdominal distention and abdominal pain. Genitourinary: Negative for difficulty urinating and flank pain. Musculoskeletal: Positive for neck pain. Negative for back pain. Skin: Positive for rash. Negative for wound. Neurological: Negative for dizziness, light-headedness and headaches.      PASTMEDICAL HISTORY     Past Medical History:   Diagnosis Date    Anxiety     Asthma     Cervical pain (neck)     Depression     Epilepsy ([facility] [address]     History of herpes genitalis 4/10/2007    see lab work scanned    Hypothyroidism     Insomnia     Internal hemorrhoids     Irritable bowel syndrome     [facility]upus ([facility] [address]     Menarche @ 14 y/o    MVP (mitral valve prolapse)     Parity     G 4 P 3  -  3 c-sections,1 ectopic    Rheumatoid arthritis (HCC)     Seizures (HCC) 2016    Tubulovillous adenoma     Vitamin D deficiency      SURGICAL HISTORY       Past Surgical History:   Procedure Laterality Date    CARPAL TUNNEL RELEASE      both    CERVIX BIOPSY       SECTION       X 3    COLONOSCOPY  8/29/11    3/5/12    DILATION AND CURETTAGE  7/15/2010    D/T menorrhagia per Dr Steph Wiley  10/11/2010    laparoscopic supracervical hyst with ovarian  preservation bilaterally - per Dr Akin Cooper  7/15/2010    with D&C per Dr Barron Perfect replacement Left/   MUO    NECK SURGERY      disc removed  from neck  [facility]O    NERVE SURGERY      both arms nerve repair    OTHER SURGICAL HISTORY      buttock proc. due to inf.  SIGMOIDOSCOPY  16    TUBULOVILLOUS ADENOMA; flexible; diminutive polypectomy, sm. int. hemorrhoids    UPPER GASTROINTESTINAL ENDOSCOPY  11    X-RAY FOOT 3+VW       CURRENT MEDICATIONS       Previous Medications    CLONIDINE (CATAPRES) 0.1 MG TABLET    0.1 mg    ESTRADIOL (ESTRACE VAGINAL) 0.1 MG/GM VAGINAL CREAM    Place 1 g vaginally daily    ESTRADIOL (ESTRACE) 1 MG TABLET    take 1 tablet by mouth once daily    FOLIC ACID (FOLVITE) 1 MG TABLET    Take 1 mg by mouth daily. FUROSEMIDE (LASIX) 20 MG TABLET    20 mg    GABAPENTIN (NEURONTIN) 800 MG TABLET    Take 1 tablet by mouth three times daily. HYDROCHLOROTHIAZIDE (HYDRODIURIL) 25 MG TABLET    Take 25 mg by mouth daily.     LAMOTRIGINE (LAMICTAL) 200 MG TABLET    Take 1 tablet by mouth 2 times daily     LEVOTHYROXINE (SYNTHROID) 25 MCG TABLET    25 mcg    LEVOTHYROXINE (SYNTHROID) 50 MCG TABLET    50 mcg    LEVOTHYROXINE (SYNTHROID) 75 MCG TABLET    Take 1 tablet by mouth daily. On empty stomach    METHOTREXATE (RHEUMATREX) 2.5 MG CHEMO TABLET    Take 12.5 mg by mouth once a week Indications: (Pt takes 5 tabs = 12.5 mg every Tuesday) (Pt takes 5 tabs = 12.5 mg every Tuesday)    ORENCIA 125 MG/ML SOSY    Inject 1 Dose into the skin once a week Indications: every Tuesday every Tuesday    PANTOPRAZOLE (PROTONIX) 20 MG TABLET    Take 1 tablet by mouth daily. PYRIDOXINE HCL (VITAMIN B-6) 25 MG TABLET        RA VITAMIN B-12 TR 1000 MCG TBCR        RANITIDINE (ZANTAC) 150 MG TABLET        TOPIRAMATE (TOPAMAX) 50 MG TABLET    Take 1 tablet by mouth 2 times daily    VALACYCLOVIR (VALTREX) 500 MG TABLET    Take 1 tablet by mouth daily    VALACYCLOVIR (VALTREX) 500 MG TABLET    Take 1 tablet by mouth 2 times daily    VENTOLIN  (90 BASE) MCG/ACT INHALER    Inhale 2 puffs into the lungs every 4 hours as needed     ALLERGIES     is allergic to latex, aspirin, cortisone, dye [iodides], ibuprofen, influenza vaccines, and penicillins. FAMILY HISTORY     She indicated that the status of her mother is unknown. She indicated that the status of her father is unknown. She indicated that the status of her maternal grandmother is unknown. She indicated that the status of her maternal grandfather is unknown. She indicated that the status of her paternal uncle is unknown. She indicated that the status of her other is unknown. SOCIAL HISTORY       Social History     Tobacco Use    Smoking status: Never Smoker    Smokeless tobacco: Never Used    Tobacco comment: x9yrs ago cigs. Vaping Use    Vaping Use: Never used   Substance Use Topics    Alcohol use: No     Alcohol/week: 0.0 standard drinks     Comment: recovering alch.  since 2000    Drug use: No     PHYSICAL EXAM     INITIAL VITALS: /68   Pulse 80   Temp 98.6 °F (37 °C)   Resp 16   Wt 131 lb (59.4 kg)   LMP 08/05/2007 (Within Years)   SpO2 98%   BMI 24.75 kg/m² Physical Exam  Vitals and nursing note reviewed. Constitutional:       General: She is not in acute distress. Appearance: She is well-developed. She is not diaphoretic. HENT:      Head: Normocephalic and atraumatic. Eyes:      Pupils: Pupils are equal, round, and reactive to light. Cardiovascular:      Rate and Rhythm: Normal rate and regular rhythm. Pulmonary:      Effort: Pulmonary effort is normal.      Breath sounds: Normal breath sounds. Abdominal:      General: Bowel sounds are normal.      Palpations: Abdomen is soft. Musculoskeletal:         General: Normal range of motion. Cervical back: Normal range of motion and neck supple. Right lower leg: Edema present. Left lower leg: Edema present. Comments: Right Lower extremity medial aspect area erythema warm to touch no lymphangitic streaking no fluctuance or mass appreciated. Skin:     General: Skin is warm. Capillary Refill: Capillary refill takes less than 2 seconds. Neurological:      Mental Status: She is alert and oriented to person, place, and time. MEDICAL DECISION MAKING:   The patient is a 80-year-old female who presented to the emergency department secondary to neck pain and rash. Given no new trauma or injury no focal neuro deficits, no clinical imaging at this time. Patient drove to the emergency department, she received Tylenol in the emerge department as well as doxycycline given concern for developing cellulitis of the right lower extremity. Patient will be discharged home with prescription for Flexeril Norco as well as doxycycline, given outpatient follow-up and parameters to return to the emergency department. All patient's question's and concerns were answered prior to disposition and patient and/or family expressed understanding and agreement of treatment plan.         CRITICAL CARE:              NIH STROKE SCALE:            PROCEDURES:    Procedures    DIAGNOSTIC RESULTS HYDROCODONE-ACETAMINOPHEN (NORCO) 5-325 MG PER TABLET    Take 1 tablet by mouth every 8 hours as needed for Pain for up to 3 days. Intended supply: 3 days. Take lowest dose possible to manage pain     Dinh Nieto MD  Attending Emergency Physician    This note was created with the assistance of a speech-recognition program. While intending to generate a document that actually reflects the content of the visit, no guarantees can be provided that every mistake has been identified and corrected by editing.                     Dinh Nieto MD  90/84/30 0582

## 2021-07-11 ENCOUNTER — HOSPITAL ENCOUNTER (EMERGENCY)
Age: 56
Discharge: HOME | End: 2021-07-11
Attending: EMERGENCY MEDICINE
Payer: COMMERCIAL

## 2021-07-11 VITALS
RESPIRATION RATE: 17 BRPM | HEART RATE: 106 BPM | DIASTOLIC BLOOD PRESSURE: 82 MMHG | OXYGEN SATURATION: 100 % | TEMPERATURE: 99.1 F | WEIGHT: 128 LBS | SYSTOLIC BLOOD PRESSURE: 123 MMHG | HEIGHT: 61 IN | BODY MASS INDEX: 24.17 KG/M2

## 2021-07-11 DIAGNOSIS — R23.8 BULLAE: Primary | ICD-10-CM

## 2021-07-11 LAB
-: NORMAL
ABSOLUTE EOS #: 0.06 K/UL (ref 0–0.44)
ABSOLUTE IMMATURE GRANULOCYTE: <0.03 K/UL (ref 0–0.3)
ABSOLUTE LYMPH #: 2.22 K/UL (ref 1.1–3.7)
ABSOLUTE MONO #: 0.4 K/UL (ref 0.1–1.2)
ANION GAP SERPL CALCULATED.3IONS-SCNC: 8 MMOL/L (ref 9–17)
BASOPHILS # BLD: 1 % (ref 0–2)
BASOPHILS ABSOLUTE: 0.03 K/UL (ref 0–0.2)
BUN BLDV-MCNC: 10 MG/DL (ref 6–20)
BUN/CREAT BLD: ABNORMAL (ref 9–20)
CALCIUM SERPL-MCNC: 8.6 MG/DL (ref 8.6–10.4)
CHLORIDE BLD-SCNC: 106 MMOL/L (ref 98–107)
CO2: 24 MMOL/L (ref 20–31)
CREAT SERPL-MCNC: 0.42 MG/DL (ref 0.5–0.9)
DIFFERENTIAL TYPE: ABNORMAL
EOSINOPHILS RELATIVE PERCENT: 1 % (ref 1–4)
GFR AFRICAN AMERICAN: >60 ML/MIN
GFR NON-AFRICAN AMERICAN: >60 ML/MIN
GFR SERPL CREATININE-BSD FRML MDRD: ABNORMAL ML/MIN/{1.73_M2}
GFR SERPL CREATININE-BSD FRML MDRD: ABNORMAL ML/MIN/{1.73_M2}
GLUCOSE BLD-MCNC: 116 MG/DL (ref 70–99)
HCT VFR BLD CALC: 32 % (ref 36.3–47.1)
HEMOGLOBIN: 10.3 G/DL (ref 11.9–15.1)
IMMATURE GRANULOCYTES: 0 %
LYMPHOCYTES # BLD: 41 % (ref 24–43)
MCH RBC QN AUTO: 32.4 PG (ref 25.2–33.5)
MCHC RBC AUTO-ENTMCNC: 32.2 G/DL (ref 28.4–34.8)
MCV RBC AUTO: 100.6 FL (ref 82.6–102.9)
MONOCYTES # BLD: 7 % (ref 3–12)
NRBC AUTOMATED: 0 PER 100 WBC
PDW BLD-RTO: 13.3 % (ref 11.8–14.4)
PLATELET # BLD: 328 K/UL (ref 138–453)
PLATELET ESTIMATE: ABNORMAL
PMV BLD AUTO: 10.7 FL (ref 8.1–13.5)
POTASSIUM SERPL-SCNC: 3.8 MMOL/L (ref 3.7–5.3)
RBC # BLD: 3.18 M/UL (ref 3.95–5.11)
RBC # BLD: ABNORMAL 10*6/UL
REASON FOR REJECTION: NORMAL
SEG NEUTROPHILS: 50 % (ref 36–65)
SEGMENTED NEUTROPHILS ABSOLUTE COUNT: 2.7 K/UL (ref 1.5–8.1)
SODIUM BLD-SCNC: 138 MMOL/L (ref 135–144)
WBC # BLD: 5.4 K/UL (ref 3.5–11.3)
WBC # BLD: ABNORMAL 10*3/UL
ZZ NTE CLEAN UP: ORDERED TEST: NORMAL
ZZ NTE WITH NAME CLEAN UP: SPECIMEN SOURCE: NORMAL

## 2021-07-11 PROCEDURE — 6360000002 HC RX W HCPCS: Performed by: EMERGENCY MEDICINE

## 2021-07-11 PROCEDURE — 80048 BASIC METABOLIC PNL TOTAL CA: CPT

## 2021-07-11 PROCEDURE — 96374 THER/PROPH/DIAG INJ IV PUSH: CPT

## 2021-07-11 PROCEDURE — 6360000002 HC RX W HCPCS: Performed by: STUDENT IN AN ORGANIZED HEALTH CARE EDUCATION/TRAINING PROGRAM

## 2021-07-11 PROCEDURE — 99284 EMERGENCY DEPT VISIT MOD MDM: CPT

## 2021-07-11 PROCEDURE — 85025 COMPLETE CBC W/AUTO DIFF WBC: CPT

## 2021-07-11 PROCEDURE — 96376 TX/PRO/DX INJ SAME DRUG ADON: CPT

## 2021-07-11 RX ORDER — MORPHINE SULFATE 4 MG/ML
2 INJECTION, SOLUTION INTRAMUSCULAR; INTRAVENOUS ONCE
Status: COMPLETED | OUTPATIENT
Start: 2021-07-11 | End: 2021-07-11

## 2021-07-11 RX ORDER — MORPHINE SULFATE 4 MG/ML
4 INJECTION, SOLUTION INTRAMUSCULAR; INTRAVENOUS ONCE
Status: COMPLETED | OUTPATIENT
Start: 2021-07-11 | End: 2021-07-11

## 2021-07-11 RX ADMIN — MORPHINE SULFATE 4 MG: 4 INJECTION INTRAVENOUS at 15:57

## 2021-07-11 RX ADMIN — MORPHINE SULFATE 2 MG: 4 INJECTION INTRAVENOUS at 16:25

## 2021-07-11 ASSESSMENT — PAIN SCALES - GENERAL
PAINLEVEL_OUTOF10: 10

## 2021-07-11 ASSESSMENT — ENCOUNTER SYMPTOMS
ABDOMINAL PAIN: 0
VOMITING: 0
SORE THROAT: 0
NAUSEA: 0
TROUBLE SWALLOWING: 0
WHEEZING: 0
SHORTNESS OF BREATH: 0
COLOR CHANGE: 1
PHOTOPHOBIA: 0

## 2021-07-11 ASSESSMENT — PAIN DESCRIPTION - LOCATION: LOCATION: ARM

## 2021-07-11 ASSESSMENT — PAIN DESCRIPTION - ORIENTATION: ORIENTATION: LEFT

## 2021-07-11 ASSESSMENT — PAIN DESCRIPTION - PAIN TYPE: TYPE: ACUTE PAIN

## 2021-07-11 NOTE — ED NOTES
Pt resting on cot, updated on plan of care, awaiting fiance, okay to go private auto for transfer if that is quicker per patient. Awaiting fiance arrival for decision.       Howard Hinson RN  07/11/21 5615

## 2021-07-11 NOTE — ED NOTES
Pt to ed from home c/o left arm surgery on 7/8/21. Pt reports she had carpel tunnel surgery, they put a \"plate\" into her arm. Patient reports she went home, reports she is on antibiotics. Patient has large blister noted to posterior left hand, reports it was \"fine\" yesterday, blister appeared today. Pt has left finger swelling, reports that's ongoing with no feeling in all fingers since having surgery. Pt has brisk cap refill.       Howard Hinson RN  07/11/21 [id number]

## 2021-07-11 NOTE — ED NOTES
Family at bedside, will transport patient via private auto to [facility]. Okay per Dr. Bre Carter.       Flori Ba, RN  07/11/21 4718

## 2021-07-11 NOTE — ED PROVIDER NOTES
[facility]  ED  Emergency Department Encounter  EmergencyMedicine Resident     Pt Name:Jackie Chatman  MRN: [id number]  [place] 1965  Date of evaluation: 21  PCP:  No primary care provider on file. CHIEF COMPLAINT       Chief Complaint   Patient presents with    Arm Pain       HISTORY OF PRESENT ILLNESS  (Location/Symptom, Timing/Onset, Context/Setting, Quality, Duration, Modifying Factors, Severity.)      Cindy Espinosa is a 54 y.o. female who presents with blisters on left hand and wrist.  Patient with recent carpal tunnel repair at outlying facility. States that she noticed blisters on her hand today and it is significantly painful to movement. Denies lesions elsewhere besides the left arm and hand. Denies associated fever, chills. Denies purulent drainage    PAST MEDICAL / SURGICAL / SOCIAL / FAMILY HISTORY      has a past medical history of Anxiety, Asthma, Cervical pain (neck), Depression, Epilepsy ([address] History of herpes genitalis, Hypothyroidism, Insomnia, Internal hemorrhoids, Irritable bowel syndrome, Lupus ([address] Menarche, MVP (mitral valve prolapse), Parity, Rheumatoid arthritis ([address] Seizures ([address] Tubulovillous adenoma, and Vitamin D deficiency. has a past surgical history that includes knee surgery; Neck surgery; x-ray foot 3+vw; Carpal tunnel release; Nerve Surgery; other surgical history; Colonoscopy (11); Upper gastrointestinal endoscopy (11);  section; Dilation & curettage (7/15/2010); hysteroscopy (7/15/2010); Hysterectomy (10/11/2010); Cervix biopsy; and sigmoidoscopy (16).       Social History     Socioeconomic History    Marital status: Single     Spouse name: Not on file    Number of children: Not on file    Years of education: Not on file    Highest education level: Not on file   Occupational History    Not on file   Tobacco Use    Smoking status: Never Smoker    Smokeless tobacco: Never Used    Tobacco comment: x9yrs ago cigs. Vaping Use    Vaping Use: Never used   Substance and Sexual Activity    Alcohol use: No     Alcohol/week: 0.0 standard drinks     Comment: recovering alch. since 2000    Drug use: No    Sexual activity: Not Currently     Partners: Male   Other Topics Concern    Not on file   Social History Narrative    Not on file     Social Determinants of Health     Financial Resource Strain:     Difficulty of Paying Living Expenses:    Food Insecurity:     Worried About Running Out of Food in the Last Year:     [address] in the Last Year:    Transportation Needs:     Lack of Transportation (Medical):  Lack of Transportation (Non-Medical):    Physical Activity:     Days of Exercise per Week:     Minutes of Exercise per Session:    Stress:     Feeling of Stress :    Social Connections:     Frequency of Communication with Friends and Family:     Frequency of Social Gatherings with Friends and Family:     Attends Restorationism Services:     Active Member of Clubs or Organizations:     Attends Club or Organization Meetings:     Marital Status:    Intimate Partner Violence:     Fear of Current or Ex-Partner:     Emotionally Abused:     Physically Abused:     Sexually Abused:        Family History   Problem Relation Age of Onset    High Blood Pressure Other     Heart Disease Other     Emphysema Other     COPD Other     Cancer Other         colon    Heart Disease Father     Hypertension Father     Cancer Father     Cancer Mother     Cancer Paternal Uncle     Cancer Maternal Grandmother     Cancer Maternal Grandfather        Allergies:  Latex, Aspirin, Cortisone, Dye [iodides], Ibuprofen, Influenza vaccines, and Penicillins    Home Medications:  Prior to Admission medications    Medication Sig Start Date End Date Taking?  Authorizing Provider   valACYclovir (VALTREX) 500 MG tablet Take 1 tablet by mouth 2 times daily 12/3/20   MARNI Devries CNP   estradiol (ESTRACE) 1 MG tablet take 1 tablet by mouth once daily 3/12/20   Yonas Alva APRN - CNP   cloNIDine (CATAPRES) 0.1 MG tablet 0.1 mg 1/13/20   Historical Provider, MD   furosemide (LASIX) 20 MG tablet 20 mg 1/13/20   Historical Provider, MD   levothyroxine (SYNTHROID) 25 MCG tablet 25 mcg 11/27/19   Historical Provider, MD   levothyroxine (SYNTHROID) 50 MCG tablet 50 mcg 12/1/19   Historical Provider, MD   valACYclovir (VALTREX) 500 MG tablet Take 1 tablet by mouth daily 10/10/18   Ayesha Barriga MD   estradiol (ESTRACE VAGINAL) 0.1 MG/GM vaginal cream Place 1 g vaginally daily 6/1/18   Ayesha Barriga MD   RA VITAMIN B-12 TR 1000 MCG TBCR  9/29/17   Historical Provider, MD   Pyridoxine HCl (VITAMIN B-6) 25 MG tablet  9/29/17   Historical Provider, MD   ranitidine (ZANTAC) 150 MG tablet  11/4/16   Historical Provider, MD   topiramate (TOPAMAX) 50 MG tablet Take 1 tablet by mouth 2 times daily  Patient taking differently: Take 100 mg by mouth 2 times daily  9/20/16   Brian Krishnamurthy MD   VENTOLIN  (90 BASE) MCG/ACT inhaler Inhale 2 puffs into the lungs every 4 hours as needed 8/23/16   Historical Provider, MD Ashraf Cascade 125 MG/ML SOSY Inject 1 Dose into the skin once a week Indications: every Tuesday every Tuesday 10/16/15   Historical Provider, MD   lamoTRIgine (LAMICTAL) 200 MG tablet Take 1 tablet by mouth 2 times daily  10/19/15   Historical Provider, MD   methotrexate (RHEUMATREX) 2.5 MG chemo tablet Take 12.5 mg by mouth once a week Indications: (Pt takes 5 tabs = 12.5 mg every Tuesday) (Pt takes 5 tabs = 12.5 mg every Tuesday) 9/15/15   Historical Provider, MD   gabapentin (NEURONTIN) 800 MG tablet Take 1 tablet by mouth three times daily. 1/29/15   Historical Provider, MD   pantoprazole (PROTONIX) 20 MG tablet Take 1 tablet by mouth daily. 1/29/15   Historical Provider, MD   hydrochlorothiazide (HYDRODIURIL) 25 MG tablet Take 25 mg by mouth daily.  10/24/13   Historical Provider, MD   levothyroxine (SYNTHROID) 75 MCG tablet Take 1 tablet by mouth daily. On empty stomach 3/26/13   Shola Wells MD   folic acid (FOLVITE) 1 MG tablet Take 1 mg by mouth daily. Mayra Robins MD       REVIEW OF SYSTEMS    (2-9 systems for level 4, 10 or more for level 5)      Review of Systems   Constitutional: Negative for chills, fatigue and fever. HENT: Negative for sore throat and trouble swallowing. Eyes: Negative for photophobia and visual disturbance. Respiratory: Negative for shortness of breath and wheezing. Gastrointestinal: Negative for abdominal pain, nausea and vomiting. Genitourinary: Negative for dysuria and flank pain. Musculoskeletal: Negative for neck pain and neck stiffness. Skin: Positive for color change, rash and wound. Neurological: Negative for weakness and numbness. Psychiatric/Behavioral: Negative for confusion. PHYSICAL EXAM   (up to 7 for level 4, 8 or more for level 5)      INITIAL VITALS:   /82   Pulse 106   Temp 99.1 °F (37.3 °C) (Oral)   Resp 17   Ht 5' 1\" (1.549 m)   Wt 128 lb (58.1 kg)   LMP 08/05/2007 (Within Years)   SpO2 97%   BMI 24.19 kg/m²     Physical Exam  Constitutional:       General: She is not in acute distress. Appearance: She is not toxic-appearing. HENT:      Head: Normocephalic and atraumatic. Nose: No congestion or rhinorrhea. Cardiovascular:      Rate and Rhythm: Normal rate. Heart sounds: No murmur heard. No friction rub. No gallop. Pulmonary:      Effort: No respiratory distress. Breath sounds: No wheezing, rhonchi or rales. Skin:     Findings: Erythema, lesion and rash present. No bruising. Comments: Large, tense bullae overlying dorsum of left hand, volar surface of left wrist and dorsum of left wrist.  Negative Nikolsky sign, no purulent drainage, mild amount of serosanguineous drainage present. Mild surrounding erythema, no induration   Neurological:      Mental Status: She is alert. Sensory: No sensory deficit. Motor: No weakness. Gait: Gait normal.     Media Information     Document Information    Wound      07/11/2021 16:16   Attached To: Hospital Encounter on 7/11/21   Source Information    [facility] Emergency [facility]     Media Information     Document Information    Wound      07/11/2021 16:16   Attached To: Hospital Encounter on 7/11/21   Source Information    [facility] Emergency Dept     Media Information     Document Information    Wound      07/11/2021 16:23   Attached To:    Hospital Encounter on 7/11/21   Source Information    [facility]         DIFFERENTIAL  DIAGNOSIS     PLAN (Leonel Wilson / Harvey Gip / EKG):  Orders Placed This Encounter   Procedures    CBC Auto Differential    SPECIMEN REJECTION    Basic Metabolic Panel w/ Reflex to MG    Inpatient consult to Orthopedic Surgery       MEDICATIONS ORDERED:  Orders Placed This Encounter   Medications    morphine injection 4 mg    morphine injection 2 mg       DDX: Contact dermatitis, bullous impetigo, Foley-Santos syndrome    DIAGNOSTIC RESULTS / EMERGENCY DEPARTMENT COURSE / MDM   LAB RESULTS:  Results for orders placed or performed during the hospital encounter of 07/11/21   CBC Auto Differential   Result Value Ref Range    WBC 5.4 3.5 - 11.3 k/uL    RBC 3.18 (L) 3.95 - 5.11 m/uL    Hemoglobin 10.3 (L) 11.9 - 15.1 g/dL    Hematocrit 32.0 (L) 36.3 - 47.1 %    .6 82.6 - 102.9 fL    MCH 32.4 25.2 - 33.5 pg    MCHC 32.2 28.4 - 34.8 g/dL    RDW 13.3 11.8 - 14.4 %    Platelets 473 876 - 400 k/uL    MPV 10.7 8.1 - 13.5 fL    NRBC Automated 0.0 0.0 per 100 WBC    Differential Type NOT REPORTED     Seg Neutrophils 50 36 - 65 %    Lymphocytes 41 24 - 43 %    Monocytes 7 3 - 12 %    Eosinophils % 1 1 - 4 %    Basophils 1 0 - 2 %    Immature Granulocytes 0 0 %    Segs Absolute 2.70 1.50 - 8.10 k/uL    Absolute Lymph # 2.22 1.10 - 3.70 k/uL    Absolute Mono # 0.40 0.10 - 1.20 k/uL    Absolute Eos # 0.06 0.00 - 0.44 k/uL    Basophils Absolute 0.03 0.00 - 0.20 k/uL    Absolute Immature Granulocyte <0.03 0.00 - 0.30 k/uL    WBC Morphology NOT REPORTED     RBC Morphology NOT REPORTED     Platelet Estimate NOT REPORTED    SPECIMEN REJECTION   Result Value Ref Range    Specimen Source NOT REPORTED     Ordered Test BMPX     Reason for Rejection Unable to perform testing: Specimen hemolyzed. - NOT REPORTED        IMPRESSION/ ED Course: 59-year-old female no acute distress presenting with pain and blistering lesions of left hand and left wrist.  Patient with a recent carpal tunnel surgery and arrives with splint in place. Patient had called her surgeon on arrival to the emergency department here. Surgeon is located outlying facility. I received a call back from resident who is recommending taking the dressing down and reapplying Polar Care over top of a Velcro wrist brace. Dressing was taken down and photos of lesions place in chart. Wound was redressed with sterile gauze, Kerlix and ace wrap in a restrictive dressing fashion. Patient requesting transfer for evaluation by her surgeon. CBC without leukocytosis or anemia. BMP with out acute abnormalities. Spoke with Dr. Kiesha Clifford at [facility] emergency department who accepted transfer. PROCEDURES:  None    CONSULTS:  IP CONSULT TO ORTHOPEDIC SURGERY    CRITICAL CARE:  Please see attending note    FINAL IMPRESSION      1. Bullae          DISPOSITION / PLAN     DISPOSITION Decision To Transfer 07/11/2021 04:27:43 PM      PATIENT REFERRED TO:  No follow-up provider specified.     DISCHARGE MEDICATIONS:  New Prescriptions    No medications on file       Anthony Estrada DO  Emergency Medicine Resident    (Please note that portions of thisnote were completed with a voice recognition program.  Efforts were made to edit the dictations but occasionally words are mis-transcribed.)        Anthony Estrada DO  Resident  07/11/21 [id number]

## 2021-07-11 NOTE — ED NOTES
Splint removed by resident. Blistered noted diffusely over left forearm and hand. Patient has polar care to left arm under brace. Patient reports she puts \"ice\" on this.       Mikel Garza RN  07/11/21 [id number]

## 2021-07-11 NOTE — ED PROVIDER NOTES
[facility]     Emergency Department     Faculty Attestation    I performed a history and physical examination of the patient and discussed management with the resident. I reviewed the residents note and agree with the documented findings and plan of care. Any areas of disagreement are noted on the chart. I was personally present for the key portions of any procedures. I have documented in the chart those procedures where I was not present during the key portions. I have reviewed the emergency nurses triage note. I agree with the chief complaint, past medical history, past surgical history, allergies, medications, social and family history as documented unless otherwise noted below. For Physician Assistant/ Nurse Practitioner cases/documentation I have personally evaluated this patient and have completed at least one if not all key elements of the E/M (history, physical exam, and MDM). Additional findings are as noted. I have personally seen and evaluated the patient. I find the patient's history and physical exam are consistent with the NP/PA documentation. I agree with the care provided, treatment rendered, disposition and follow-up plan. Pain and bullous changes noted to the left hand will notify surgeon antibiotics pain control      Critical Care     Clayton Huber M.D.   Attending Emergency  Physician              Eunice Calderón MD  07/11/21 [id number]

## 2021-09-15 ENCOUNTER — HOSPITAL ENCOUNTER (OUTPATIENT)
Age: 56
Setting detail: SPECIMEN
Discharge: HOME | End: 2021-09-15
Payer: COMMERCIAL

## 2021-09-15 ENCOUNTER — OFFICE VISIT (OUTPATIENT)
Dept: OBGYN CLINIC | Age: 56
End: 2021-09-15
Payer: COMMERCIAL

## 2021-09-15 VITALS
HEART RATE: 76 BPM | DIASTOLIC BLOOD PRESSURE: 71 MMHG | BODY MASS INDEX: 23.05 KG/M2 | WEIGHT: 122 LBS | SYSTOLIC BLOOD PRESSURE: 103 MMHG

## 2021-09-15 DIAGNOSIS — K58.0 IRRITABLE BOWEL SYNDROME WITH DIARRHEA: ICD-10-CM

## 2021-09-15 DIAGNOSIS — N89.8 VAGINAL IRRITATION: ICD-10-CM

## 2021-09-15 DIAGNOSIS — N89.8 VAGINAL DISCHARGE: ICD-10-CM

## 2021-09-15 DIAGNOSIS — N89.8 VAGINAL DISCHARGE: Primary | ICD-10-CM

## 2021-09-15 DIAGNOSIS — Z12.31 ENCOUNTER FOR SCREENING MAMMOGRAM FOR BREAST CANCER: ICD-10-CM

## 2021-09-15 DIAGNOSIS — N95.2 VAGINAL ATROPHY: ICD-10-CM

## 2021-09-15 DIAGNOSIS — R11.0 NAUSEA: ICD-10-CM

## 2021-09-15 DIAGNOSIS — B37.2 SKIN YEAST INFECTION: ICD-10-CM

## 2021-09-15 PROCEDURE — 1036F TOBACCO NON-USER: CPT | Performed by: CLINICAL NURSE SPECIALIST

## 2021-09-15 PROCEDURE — G8427 DOCREV CUR MEDS BY ELIG CLIN: HCPCS | Performed by: CLINICAL NURSE SPECIALIST

## 2021-09-15 PROCEDURE — 99214 OFFICE O/P EST MOD 30 MIN: CPT | Performed by: CLINICAL NURSE SPECIALIST

## 2021-09-15 PROCEDURE — G8420 CALC BMI NORM PARAMETERS: HCPCS | Performed by: CLINICAL NURSE SPECIALIST

## 2021-09-15 PROCEDURE — 3017F COLORECTAL CA SCREEN DOC REV: CPT | Performed by: CLINICAL NURSE SPECIALIST

## 2021-09-15 RX ORDER — METHYLPREDNISOLONE 4 MG/1
TABLET ORAL
COMMUNITY
Start: 2021-09-07 | End: 2021-09-15

## 2021-09-15 RX ORDER — FLUCONAZOLE 100 MG/1
100 TABLET ORAL DAILY
Qty: 7 TABLET | Refills: 0 | Status: SHIPPED | OUTPATIENT
Start: 2021-09-15 | End: 2021-09-22

## 2021-09-15 RX ORDER — ONDANSETRON 4 MG/1
TABLET, FILM COATED ORAL
COMMUNITY
Start: 2021-07-08 | End: 2022-06-02

## 2021-09-15 RX ORDER — NYSTATIN 100000 U/G
CREAM TOPICAL
Qty: 30 G | Refills: 1 | Status: SHIPPED | OUTPATIENT
Start: 2021-09-15

## 2021-09-15 RX ORDER — CETIRIZINE HYDROCHLORIDE 10 MG/1
TABLET ORAL
COMMUNITY
Start: 2021-09-07

## 2021-09-15 RX ORDER — TOFACITINIB 11 MG/1
TABLET, FILM COATED, EXTENDED RELEASE ORAL
Status: ON HOLD | COMMUNITY
Start: 2021-08-03 | End: 2022-02-01

## 2021-09-15 RX ORDER — ONDANSETRON 4 MG/1
4 TABLET, ORALLY DISINTEGRATING ORAL EVERY 8 HOURS PRN
Qty: 30 TABLET | Refills: 2 | Status: SHIPPED | OUTPATIENT
Start: 2021-09-15

## 2021-09-15 RX ORDER — CLONAZEPAM 0.5 MG/1
TABLET ORAL
Status: ON HOLD | COMMUNITY
Start: 2021-07-30 | End: 2022-02-01

## 2021-09-15 RX ORDER — KETOTIFEN FUMARATE 0.35 MG/ML
SOLUTION/ DROPS OPHTHALMIC
COMMUNITY
Start: 2021-09-07 | End: 2022-06-02

## 2021-09-15 RX ORDER — METRONIDAZOLE 500 MG/1
500 TABLET ORAL 2 TIMES DAILY
Qty: 14 TABLET | Refills: 0 | Status: SHIPPED | OUTPATIENT
Start: 2021-09-15 | End: 2021-09-22

## 2021-09-15 RX ORDER — ALBUTEROL SULFATE 90 UG/1
2 AEROSOL, METERED RESPIRATORY (INHALATION) 2 TIMES DAILY
Qty: 54 G | Refills: 0 | Status: SHIPPED | OUTPATIENT
Start: 2021-09-15

## 2021-09-15 RX ORDER — TIZANIDINE 4 MG/1
TABLET ORAL
COMMUNITY
Start: 2021-09-12

## 2021-09-15 RX ORDER — OXYCODONE HYDROCHLORIDE AND ACETAMINOPHEN 5; 325 MG/1; MG/1
TABLET ORAL
Status: ON HOLD | COMMUNITY
Start: 2021-08-25 | End: 2022-02-01

## 2021-09-15 RX ORDER — MULTIVIT-MIN/IRON/FOLIC ACID/K 18-600-40
CAPSULE ORAL
COMMUNITY
Start: 2021-09-07

## 2021-09-15 SDOH — ECONOMIC STABILITY: FOOD INSECURITY: WITHIN THE PAST 12 MONTHS, YOU WORRIED THAT YOUR FOOD WOULD RUN OUT BEFORE YOU GOT MONEY TO BUY MORE.: NEVER TRUE

## 2021-09-15 SDOH — ECONOMIC STABILITY: TRANSPORTATION INSECURITY
IN THE PAST 12 MONTHS, HAS LACK OF TRANSPORTATION KEPT YOU FROM MEETINGS, WORK, OR FROM GETTING THINGS NEEDED FOR DAILY LIVING?: NO

## 2021-09-15 SDOH — ECONOMIC STABILITY: TRANSPORTATION INSECURITY
IN THE PAST 12 MONTHS, HAS THE LACK OF TRANSPORTATION KEPT YOU FROM MEDICAL APPOINTMENTS OR FROM GETTING MEDICATIONS?: NO

## 2021-09-15 SDOH — ECONOMIC STABILITY: FOOD INSECURITY: WITHIN THE PAST 12 MONTHS, THE FOOD YOU BOUGHT JUST DIDN'T LAST AND YOU DIDN'T HAVE MONEY TO GET MORE.: NEVER TRUE

## 2021-09-15 ASSESSMENT — ENCOUNTER SYMPTOMS
ALLERGIC/IMMUNOLOGIC NEGATIVE: 1
NAUSEA: 1
DIARRHEA: 1
RESPIRATORY NEGATIVE: 1
EYES NEGATIVE: 1

## 2021-09-15 ASSESSMENT — SOCIAL DETERMINANTS OF HEALTH (SDOH): HOW HARD IS IT FOR YOU TO PAY FOR THE VERY BASICS LIKE FOOD, HOUSING, MEDICAL CARE, AND HEATING?: NOT HARD AT ALL

## 2021-09-15 NOTE — PROGRESS NOTES
Subjective:      Patient ID:  Krissy Hennessy is a 54 y.o. female who presents for   Chief Complaint   Patient presents with    Vaginal Discharge       HPI   Patient is a 55 yo female who presents for white to yellow vaginal discharge and odor for approx. 1-2 weeks and she is reporting irritation. Patient states that she is having some cramping but is unsure if that is due to her IBS. Review of Systems   Constitutional: Negative for chills and fever. HENT: Negative. Eyes: Negative. Respiratory: Negative. Cardiovascular: Negative. Gastrointestinal: Positive for diarrhea (hx of IBS) and nausea. Endocrine: Negative. Genitourinary: Positive for vaginal discharge (white to yellow discharge with odor and irritation fo rthe past 1 wk). Negative for dysuria and menstrual problem. Musculoskeletal: Negative. Skin: Negative. Allergic/Immunologic: Negative. Neurological: Negative. Hematological: Negative. Psychiatric/Behavioral: Negative. /71 (Site: Right Upper Arm, Position: Sitting)   Pulse 76   Wt 122 lb (55.3 kg)   LMP 08/05/2007 (Within Years)   BMI 23.05 kg/m²    Patient's last menstrual period was 08/05/2007 (within years).     Family History   Problem Relation Age of Onset    High Blood Pressure Other     Heart Disease Other     Emphysema Other     COPD Other     Cancer Other         colon    Heart Disease Father     Hypertension Father     Cancer Father     Cancer Mother     Cancer Paternal Uncle     Cancer Maternal Grandmother     Cancer Maternal Grandfather       Past Medical History:   Diagnosis Date    Anxiety     Asthma     Cervical pain (neck)     Depression     Epilepsy ([address]     History of herpes genitalis 4/10/2007    see lab work scanned    Hypothyroidism     Insomnia     Internal hemorrhoids     Irritable bowel syndrome     Lupus ([address]     Menarche @ 12 y/o    MVP (mitral valve prolapse)     Parity     G 4 P 3  -  3 c-sections,1 ectopic    Rheumatoid arthritis ([address] [address] [facility]     Seizures ([address] [address] 2016    Tubulovillous adenoma     Vitamin D deficiency       Past Surgical History:   Procedure Laterality Date    CARPAL TUNNEL RELEASE      both    CERVIX BIOPSY       SECTION       X 3    COLONOSCOPY  8/29/11    3/5/12    DILATION AND CURETTAGE  7/15/2010    D/T menorrhagia per Dr Lg Carty  10/11/2010    laparoscopic supracervical hyst with ovarian  preservation bilaterally - per Dr Randle Rater  7/15/2010    with D&C per Dr Luanne Jarvis replacement Left/   [facility]    NECK SURGERY      disc removed  from neck  [facility]    NERVE SURGERY      both arms nerve repair    OTHER SURGICAL HISTORY      buttock proc. due to inf.  SIGMOIDOSCOPY  16    TUBULOVILLOUS ADENOMA; flexible; diminutive polypectomy, sm. int. hemorrhoids    UPPER GASTROINTESTINAL ENDOSCOPY  11    X-RAY FOOT 3+VW        Social History     Socioeconomic History    Marital status: Single     Spouse name: None    Number of children: None    Years of education: None    Highest education level: None   Occupational History    None   Tobacco Use    Smoking status: Never Smoker    Smokeless tobacco: Never Used    Tobacco comment: x9yrs ago cigs. Vaping Use    Vaping Use: Never used   Substance and Sexual Activity    Alcohol use: No     Alcohol/week: 0.0 standard drinks     Comment: recovering alch.  since     Drug use: No    Sexual activity: Not Currently     Partners: Male   Other Topics Concern    None   Social History Narrative    None     Social Determinants of Health     Financial Resource Strain: Low Risk     Difficulty of Paying Living Expenses: Not hard at all   Food Insecurity: No Food Insecurity    Worried About Running Out of Food in the Last Year: Never true    Will of Food in the Last Year: Never true   Transportation Needs: No Transportation Needs    Lack of Transportation (Medical): No    Lack of Transportation (Non-Medical): No   Physical Activity:     Days of Exercise per Week:     Minutes of Exercise per Session:    Stress:     Feeling of Stress :    Social Connections:     Frequency of Communication with Friends and Family:     Frequency of Social Gatherings with Friends and Family:     Attends Yarsanism Services:     Active Member of Clubs or Organizations:     Attends Club or Organization Meetings:     Marital Status:    Intimate Partner Violence:     Fear of Current or Ex-Partner:     Emotionally Abused:     Physically Abused:     Sexually Abused:       Current Outpatient Medications   Medication Sig Dispense Refill    cetirizine (ZYRTEC) 10 MG tablet take 1 tablet by mouth once daily      clonazePAM (KLONOPIN) 0.5 MG tablet take 1 AND 1/2 tablet by mouth once daily      ketotifen (ZADITOR) 0.025 % ophthalmic solution instill 1 drop into both eyes twice a day      Multiple Vitamins-Minerals (MULTI FOR HER 50+) CAPS take 1 capsule by mouth once daily with food      ondansetron (ZOFRAN) 4 MG tablet TAKE ONE TABLET BY MOUTH EVERY 8 HOURS AS NEEDED      oxyCODONE-acetaminophen (PERCOCET) 5-325 MG per tablet take 1 tablet by mouth every 8 hours if needed      tiZANidine (ZANAFLEX) 4 MG tablet       XELJANZ XR 11 MG TB24 TAKE ONE TABLET BY MOUTH EVERY DAY      albuterol sulfate HFA (VENTOLIN HFA) 108 (90 Base) MCG/ACT inhaler Inhale 2 puffs into the lungs 2 times daily 54 g 0    ondansetron (ZOFRAN ODT) 4 MG disintegrating tablet Take 1 tablet by mouth every 8 hours as needed for Nausea or Vomiting 30 tablet 2    metroNIDAZOLE (FLAGYL) 500 MG tablet Take 1 tablet by mouth 2 times daily for 7 days 14 tablet 0    fluconazole (DIFLUCAN) 100 MG tablet Take 1 tablet by mouth daily for 7 days 7 tablet 0    nystatin (MYCOSTATIN) 560655 UNIT/GM cream Apply topically 2 times daily.  30 g 1    estradiol (ESTRACE) 1 MG tablet take 1 tablet by mouth was uncomfortable with that  Musculoskeletal:         General: Normal range of motion. Cervical back: Normal range of motion and neck supple. Skin:     General: Skin is warm and dry. Neurological:      Mental Status: She is oriented to person, place, and time. Psychiatric:         Behavior: Behavior normal.         Thought Content: Thought content normal.         Judgment: Judgment normal.         Assessment:      Diagnosis Orders   1. Vaginal discharge  C.trachomatis N.gonorrhoeae DNA    VAGINITIS DNA PROBE    metroNIDAZOLE (FLAGYL) 500 MG tablet    fluconazole (DIFLUCAN) 100 MG tablet   2. Encounter for screening mammogram for breast cancer  [facility] DIGITAL SCREEN W OR WO CAD BILATERAL   3. Nausea  ondansetron (ZOFRAN ODT) 4 MG disintegrating tablet   4. Irritable bowel syndrome with diarrhea     5. Vaginal irritation  nystatin (MYCOSTATIN) 427980 UNIT/GM cream   6. Skin yeast infection  nystatin (MYCOSTATIN) 785014 UNIT/GM cream   7. Vaginal atrophy             Plan:      Return for as needed. Patient was seen with total face to face time of 30 minutes. More than 50% of this visit was on counseling andeducation regarding the problems listed below and her options. She was also counseled on her preventative health maintenance recommendations and follow-up.     Electronically signed by: Jos Espinoza CNP

## 2021-09-16 LAB
C TRACH DNA GENITAL QL NAA+PROBE: NEGATIVE
DIRECT EXAM: NORMAL
Lab: NORMAL
N. GONORRHOEAE DNA: NEGATIVE
SPECIMEN DESCRIPTION: NORMAL
SPECIMEN DESCRIPTION: NORMAL

## 2021-10-15 ENCOUNTER — APPOINTMENT (OUTPATIENT)
Dept: GENERAL RADIOLOGY | Age: 56
End: 2021-10-15
Payer: COMMERCIAL

## 2021-10-15 ENCOUNTER — HOSPITAL ENCOUNTER (EMERGENCY)
Age: 56
Discharge: HOME | End: 2021-10-15
Attending: EMERGENCY MEDICINE
Payer: COMMERCIAL

## 2021-10-15 VITALS
RESPIRATION RATE: 19 BRPM | OXYGEN SATURATION: 100 % | DIASTOLIC BLOOD PRESSURE: 95 MMHG | BODY MASS INDEX: 23.6 KG/M2 | SYSTOLIC BLOOD PRESSURE: 135 MMHG | HEART RATE: 84 BPM | TEMPERATURE: 98 F | HEIGHT: 61 IN | WEIGHT: 125 LBS

## 2021-10-15 DIAGNOSIS — S46.912A STRAIN OF LEFT SHOULDER, INITIAL ENCOUNTER: ICD-10-CM

## 2021-10-15 DIAGNOSIS — S46.911A STRAIN OF RIGHT ELBOW, INITIAL ENCOUNTER: ICD-10-CM
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
## 2021-10-15 DIAGNOSIS — S99.922A FOOT INJURY, LEFT, INITIAL ENCOUNTER: ICD-10-CM

## 2021-10-15 DIAGNOSIS — S60.221A CONTUSION OF RIGHT HAND, INITIAL ENCOUNTER: ICD-10-CM

## 2021-10-15 DIAGNOSIS — S99.912A INJURY OF LEFT ANKLE, INITIAL ENCOUNTER: ICD-10-CM

## 2021-10-15 DIAGNOSIS — S66.911A STRAIN OF RIGHT WRIST, INITIAL ENCOUNTER: ICD-10-CM

## 2021-10-15 PROCEDURE — 73080 X-RAY EXAM OF ELBOW: CPT

## 2021-10-15 PROCEDURE — 73110 X-RAY EXAM OF WRIST: CPT

## 2021-10-15 PROCEDURE — 73630 X-RAY EXAM OF FOOT: CPT

## 2021-10-15 PROCEDURE — 73130 X-RAY EXAM OF HAND: CPT

## 2021-10-15 PROCEDURE — 73030 X-RAY EXAM OF SHOULDER: CPT

## 2021-10-15 PROCEDURE — 99283 EMERGENCY DEPT VISIT LOW MDM: CPT

## 2021-10-15 PROCEDURE — 73610 X-RAY EXAM OF ANKLE: CPT

## 2021-10-15 ASSESSMENT — VISUAL ACUITY: OU: 1

## 2021-10-15 ASSESSMENT — PAIN DESCRIPTION - PAIN TYPE: TYPE: ACUTE PAIN

## 2021-10-15 ASSESSMENT — PAIN SCALES - GENERAL: PAINLEVEL_OUTOF10: 9
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
## 2021-11-15 ENCOUNTER — TELEPHONE (OUTPATIENT)
Dept: OBGYN CLINIC | Age: 56
End: 2021-11-15

## 2021-11-16 RX ORDER — FLUCONAZOLE 100 MG/1
100 TABLET ORAL DAILY
Qty: 7 TABLET | Refills: 0 | Status: SHIPPED | OUTPATIENT
Start: 2021-11-16 | End: 2021-11-23

## 2021-11-16 RX ORDER — METRONIDAZOLE 500 MG/1
500 TABLET ORAL 2 TIMES DAILY
Qty: 14 TABLET | Refills: 0 | Status: SHIPPED | OUTPATIENT
Start: 2021-11-16 | End: 2021-11-23

## 2021-12-01 ENCOUNTER — OFFICE VISIT (OUTPATIENT)
Dept: GASTROENTEROLOGY | Age: 56
End: 2021-12-01
Payer: COMMERCIAL

## 2021-12-01 VITALS — BODY MASS INDEX: 22.11 KG/M2 | WEIGHT: 117 LBS

## 2021-12-01 DIAGNOSIS — K58.8 OTHER IRRITABLE BOWEL SYNDROME: ICD-10-CM

## 2021-12-01 DIAGNOSIS — K62.5 RECTAL BLEEDING: ICD-10-CM

## 2021-12-01 DIAGNOSIS — K64.8 INTERNAL HEMORRHOIDS: ICD-10-CM

## 2021-12-01 DIAGNOSIS — D36.9 TUBULOVILLOUS ADENOMA: Primary | ICD-10-CM

## 2021-12-01 PROCEDURE — G8484 FLU IMMUNIZE NO ADMIN: HCPCS | Performed by: INTERNAL MEDICINE

## 2021-12-01 PROCEDURE — G8427 DOCREV CUR MEDS BY ELIG CLIN: HCPCS | Performed by: INTERNAL MEDICINE

## 2021-12-01 PROCEDURE — 1036F TOBACCO NON-USER: CPT | Performed by: INTERNAL MEDICINE

## 2021-12-01 PROCEDURE — G8420 CALC BMI NORM PARAMETERS: HCPCS | Performed by: INTERNAL MEDICINE

## 2021-12-01 PROCEDURE — 3017F COLORECTAL CA SCREEN DOC REV: CPT | Performed by: INTERNAL MEDICINE

## 2021-12-01 PROCEDURE — 99204 OFFICE O/P NEW MOD 45 MIN: CPT | Performed by: INTERNAL MEDICINE

## 2021-12-01 RX ORDER — POLYETHYLENE GLYCOL 3350, SODIUM SULFATE ANHYDROUS, SODIUM BICARBONATE, SODIUM CHLORIDE, POTASSIUM CHLORIDE 236; 22.74; 6.74; 5.86; 2.97 G/4L; G/4L; G/4L; G/4L; G/4L
4 POWDER, FOR SOLUTION ORAL ONCE
Qty: 4000 ML | Refills: 0 | Status: SHIPPED | OUTPATIENT
Start: 2021-12-01 | End: 2021-12-01

## 2021-12-01 ASSESSMENT — ENCOUNTER SYMPTOMS
DIARRHEA: 0
NAUSEA: 1
ABDOMINAL PAIN: 1
WHEEZING: 0
COUGH: 0
BACK PAIN: 0
CHOKING: 0
SHORTNESS OF BREATH: 0
VOICE CHANGE: 0
CONSTIPATION: 1
COLOR CHANGE: 0
TROUBLE SWALLOWING: 0
BLOOD IN STOOL: 1
VOMITING: 1
RECTAL PAIN: 1
SORE THROAT: 0
ANAL BLEEDING: 1
ABDOMINAL DISTENTION: 1

## 2021-12-01 NOTE — PROGRESS NOTES
GI OFFICE FOLLOW UP    Colleen Ross is a 64 y.o. female evaluated via on 12/1/2021. Consent:  She and/or health care decision maker is aware that that she may receive a bill for this telephone service, depending on her insurance coverage, and has provided verbal consent to proceed: YES      INTERVAL HISTORY:   No referring provider defined for this encounter. Chief Complaint   Patient presents with    New Patient     Patient here to reestablish care. Patient states she is having hard stools and rectal bleeding / patient states she has been vomiting , patient is taking medication for it .  Irritable Bowel Syndrome    GI Bleeding       1. Tubulovillous adenoma    2. Other irritable bowel syndrome    3. Rectal bleeding    4.  Internal hemorrhoids      This patient is seen in my office after 5 years    She had a colonoscopy  done in 2016 and was found to have a tubulovillous adenoma    She also had complained of significant abdominal pain at that time was ordered to have a CT scan done apparently she never had it done and never showed up in the office since then    She history significant for lupus and other rheumatologic issues has been followed by a rheumatologist/[facility]    Has been on steroid courses off-and-on    Has history for acid reflux indigestion symptoms associated with some nausea symptoms    Questionable weight loss    Patient has been complaining of some abdominal pains, off and on cramping  Also complains of abdominal bloating and gas  Has off and on nausea without any sig vomiting  Has some alternating constipation and diarrhea  Has no weight loss  Has some anxiety issues    Off-and-on rectal bleeding    Denies any current smoking alcohol abuse illicit drug usage    No known family history for colon cancer      HISTORY OF PRESENT ILLNESS: Ms.Cheryl IRBY Caleb Castillo is a 64 y.o. female with a past history remarkable for , referred for evaluation of   Chief Complaint   Patient presents with   [facility] Patient     Patient here to reestablish care. Patient states she is having hard stools and rectal bleeding / patient states she has been vomiting , patient is taking medication for it .  Irritable Bowel Syndrome    GI Bleeding   . Past Medical,Family, and Social History reviewed and does contribute to the patient presenting condition. Patient's PMH/PSH,SH,PSYCH Hx, MEDs, ALLERGIES, and ROS were all reviewed and updated in the appropriate sections. PAST MEDICAL HISTORY:  Past Medical History:   Diagnosis Date    Anxiety     Asthma     Cervical pain (neck)     Depression     Epilepsy ([address]     History of herpes genitalis 4/10/2007    see lab work scanned    Hypothyroidism     Insomnia     Internal hemorrhoids     Irritable bowel syndrome     Lupus ([address]     Menarche @ 14 y/o    MVP (mitral valve prolapse)     Parity     G 4 P 3  -  3 c-sections,1 ectopic    Rheumatoid arthritis (HCC)     Seizures ([facility] 2016    Tubulovillous adenoma     Vitamin D deficiency        Past Surgical History:   Procedure Laterality Date    CARPAL TUNNEL RELEASE      both    CERVIX BIOPSY       SECTION       X 3    COLONOSCOPY  8/29/11    3/5/12    DILATION AND CURETTAGE  7/15/2010    D/T menorrhagia per Dr Bethany Singh  10/11/2010    laparoscopic supracervical hyst with ovarian  preservation bilaterally - per Dr Reggie Brasher  7/15/2010    with D&C per Dr Anna Sanchez replacement Left/   [facility]O    NECK SURGERY      disc removed  from neck  [facility]    NERVE SURGERY      both arms nerve repair    OTHER SURGICAL HISTORY      buttock proc. due to inf.     SIGMOIDOSCOPY  16    TUBULOVILLOUS ADENOMA; flexible; diminutive polypectomy, sm. int. hemorrhoids    UPPER GASTROINTESTINAL ENDOSCOPY  11    X-RAY FOOT 3+VW         CURRENT MEDICATIONS:    Current Outpatient Medications:     cetirizine (ZYRTEC) 10 MG tablet, take 1 tablet by mouth once daily, Disp: , Rfl:     clonazePAM (KLONOPIN) 0.5 MG tablet, take 1 AND 1/2 tablet by mouth once daily, Disp: , Rfl:     ketotifen (ZADITOR) 0.025 % ophthalmic solution, instill 1 drop into both eyes twice a day, Disp: , Rfl:     Multiple Vitamins-Minerals (MULTI FOR HER 50+) CAPS, take 1 capsule by mouth once daily with food, Disp: , Rfl:     ondansetron (ZOFRAN) 4 MG tablet, TAKE ONE TABLET BY MOUTH EVERY 8 HOURS AS NEEDED, Disp: , Rfl:     tiZANidine (ZANAFLEX) 4 MG tablet, , Disp: , Rfl:     albuterol sulfate HFA (VENTOLIN HFA) 108 (90 Base) MCG/ACT inhaler, Inhale 2 puffs into the lungs 2 times daily, Disp: 54 g, Rfl: 0    ondansetron (ZOFRAN ODT) 4 MG disintegrating tablet, Take 1 tablet by mouth every 8 hours as needed for Nausea or Vomiting, Disp: 30 tablet, Rfl: 2    nystatin (MYCOSTATIN) 147658 UNIT/GM cream, Apply topically 2 times daily. , Disp: 30 g, Rfl: 1    estradiol (ESTRACE) 1 MG tablet, take 1 tablet by mouth once daily, Disp: 28 tablet, Rfl: 1    cloNIDine (CATAPRES) 0.1 MG tablet, 0.1 mg, Disp: , Rfl:     furosemide (LASIX) 20 MG tablet, 20 mg, Disp: , Rfl:     valACYclovir (VALTREX) 500 MG tablet, Take 1 tablet by mouth daily, Disp: 30 tablet, Rfl: 2    estradiol (ESTRACE VAGINAL) 0.1 MG/GM vaginal cream, Place 1 g vaginally daily, Disp: 1 Tube, Rfl: 3    RA VITAMIN B-12 TR 1000 MCG TBCR, , Disp: , Rfl: 0    Pyridoxine HCl (VITAMIN B-6) 25 MG tablet, , Disp: , Rfl: 0    ranitidine (ZANTAC) 150 MG tablet, , Disp: , Rfl: 0    topiramate (TOPAMAX) 50 MG tablet, Take 1 tablet by mouth 2 times daily (Patient taking differently: Take 100 mg by mouth 2 times daily ), Disp: 60 tablet, Rfl: 3    VENTOLIN  (90 BASE) MCG/ACT inhaler, Inhale 2 puffs into the lungs every 4 hours as needed , Disp: , Rfl: 0    lamoTRIgine (LAMICTAL) 200 MG tablet, Take 1 tablet by mouth 2 times daily , Disp: , Rfl: 1    methotrexate (RHEUMATREX) 2.5 MG chemo tablet, Take 12.5 mg by mouth once a week Indications: (Pt takes 5 tabs = 12.5 mg every Tuesday) (Pt takes 5 tabs = 12.5 mg every Tuesday), Disp: , Rfl: 0    gabapentin (NEURONTIN) 800 MG tablet, Take 1 tablet by mouth three times daily. , Disp: , Rfl:     pantoprazole (PROTONIX) 20 MG tablet, Take 1 tablet by mouth daily. , Disp: , Rfl:     hydrochlorothiazide (HYDRODIURIL) 25 MG tablet, Take 25 mg by mouth daily. , Disp: , Rfl:     levothyroxine (SYNTHROID) 75 MCG tablet, Take 1 tablet by mouth daily. On empty stomach, Disp: 30 tablet, Rfl: 5    folic acid (FOLVITE) 1 MG tablet, Take 1 mg by mouth daily.   , Disp: , Rfl:     oxyCODONE-acetaminophen (PERCOCET) 5-325 MG per tablet, take 1 tablet by mouth every 8 hours if needed (Patient not taking: Reported on 12/1/2021), Disp: , Rfl:     XELJANZ XR 11 MG TB24, TAKE ONE TABLET BY MOUTH EVERY DAY (Patient not taking: Reported on 12/1/2021), Disp: , Rfl:     ALLERGIES:   Allergies   Allergen Reactions    Latex     Aspirin     Cortisone     Dye [Iodides] Other (See Comments)     IV dye leaked into arm and caused local swelling in that area    Ibuprofen     Influenza Vaccines      On orencia, for RA    Penicillins        FAMILY HISTORY:       Problem Relation Age of Onset    High Blood Pressure Other     Heart Disease Other     Emphysema Other     COPD Other     Cancer Other         colon    Heart Disease Father     Hypertension Father     Cancer Father     Cancer Mother     Cancer Paternal Uncle     Cancer Maternal Grandmother     Cancer Maternal Grandfather          SOCIAL HISTORY:   Social History     Socioeconomic History    Marital status: Single     Spouse name: Not on file    Number of children: Not on file    Years of education: Not on file    Highest education level: Not on file   Occupational History    Not on file   Tobacco Use  Smoking status: Never Smoker    Smokeless tobacco: Never Used    Tobacco comment: x9yrs ago cigs. Vaping Use    Vaping Use: Never used   Substance and Sexual Activity    Alcohol use: No     Alcohol/week: 0.0 standard drinks     Comment: recovering alch. since 2000    Drug use: No    Sexual activity: Not Currently     Partners: Male   Other Topics Concern    Not on file   Social History Narrative    Not on file     Social Determinants of Health     Financial Resource Strain: Low Risk     Difficulty of Paying Living Expenses: Not hard at all   Food Insecurity: No Food Insecurity    Worried About Running Out of Food in the Last Year: Never true    Will of Food in the Last Year: Never true   Transportation Needs: No Transportation Needs    Lack of Transportation (Medical): No    Lack of Transportation (Non-Medical): No   Physical Activity:     Days of Exercise per Week: Not on file    Minutes of Exercise per Session: Not on file   Stress:     Feeling of Stress : Not on file   Social Connections:     Frequency of Communication with Friends and Family: Not on file    Frequency of Social Gatherings with Friends and Family: Not on file    Attends [employer] Services: Not on file    Active Member of [address] [place] [address] or Organizations: Not on file    Attends Club or Organization Meetings: Not on file    Marital Status: Not on file   Intimate Partner Violence:     Fear of Current or Ex-Partner: Not on file    Emotionally Abused: Not on file    Physically Abused: Not on file    Sexually Abused: Not on file   Housing Stability:     Unable to Pay for Housing in the Last Year: Not on file    Number of [place] in the Last Year: Not on file    Unstable Housing in the Last Year: Not on file         REVIEW OF SYSTEMS:         Review of Systems   Constitutional: Negative for appetite change, fatigue and unexpected weight change.    HENT: Negative for dental problem, sore throat, trouble swallowing and voice change. Eyes: Negative for visual disturbance. Respiratory: Negative for cough, choking, shortness of breath and wheezing. Cardiovascular: Negative for chest pain, palpitations and leg swelling. Gastrointestinal: Positive for abdominal distention, abdominal pain, anal bleeding, blood in stool, constipation, nausea, rectal pain and vomiting. Negative for diarrhea. Genitourinary: Negative for difficulty urinating. Musculoskeletal: Negative for arthralgias, back pain and joint swelling. Skin: Negative for color change and rash. Allergic/Immunologic: Negative for environmental allergies, food allergies and immunocompromised state. Neurological: Negative for dizziness, seizures, weakness, light-headedness, numbness and headaches. Hematological: Bruises/bleeds easily. Psychiatric/Behavioral: Negative for confusion and sleep disturbance. The patient is nervous/anxious. PHYSICAL EXAMINATION: Vital signs reviewed per the nursing documentation. Wt 117 lb (53.1 kg)   LMP 08/05/2007 (Within Years)   BMI 22.11 kg/m²   Body mass index is 22.11 kg/m². Physical Exam  Constitutional:       Appearance: She is well-developed. Comments: Anxious    HENT:      Head: Normocephalic and atraumatic. Eyes:      Conjunctiva/sclera: Conjunctivae normal.      Pupils: Pupils are equal, round, and reactive to light. Cardiovascular:      Heart sounds: Normal heart sounds. Pulmonary:      Effort: Pulmonary effort is normal.      Breath sounds: Normal breath sounds. Abdominal:      General: Bowel sounds are normal.      Palpations: Abdomen is soft. Comments: +  TENDER, NON DISTENTED  LIVER SPLEEN AND HERNIAS ARE NOT  PALPABLE  BOWEL SOUNDS ARE POSITIVE      Musculoskeletal:         General: Normal range of motion. Cervical back: Normal range of motion and neck supple. Skin:     General: Skin is warm.    Neurological:      Mental Status: She is alert and oriented to person, place, and time.   Psychiatric:         Behavior: Behavior normal.           LABORATORY DATA: Reviewed  Lab Results   Component Value Date    WBC 5.4 07/11/2021    HGB 10.3 (L) 07/11/2021    HCT 32.0 (L) 07/11/2021    .6 07/11/2021     07/11/2021     07/11/2021    K 3.8 07/11/2021     07/11/2021    CO2 24 07/11/2021    BUN 10 07/11/2021    CREATININE 0.42 (L) 07/11/2021    LABPROT 7.2 03/23/2012    LABALBU 3.5 01/16/2021    BILITOT 0.55 01/16/2021    ALKPHOS 73 01/16/2021    AST 15 01/16/2021    ALT 7 01/16/2021    INR 1.0 09/07/2017         Lab Results   Component Value Date    RBC 3.18 (L) 07/11/2021    HGB 10.3 (L) 07/11/2021    .6 07/11/2021    MCH 32.4 07/11/2021    MCHC 32.2 07/11/2021    RDW 13.3 07/11/2021    MPV 10.7 07/11/2021    BASOPCT 1 07/11/2021    LYMPHSABS 2.22 07/11/2021    MONOSABS 0.40 07/11/2021    NEUTROABS 2.70 07/11/2021    EOSABS 0.06 07/11/2021    BASOSABS 0.03 07/11/2021         DIAGNOSTIC TESTING:     No results found. Assessment  1. Tubulovillous adenoma    2. Other irritable bowel syndrome    3. Rectal bleeding    4. Internal hemorrhoids        Plan    Plan EGD and Colonoscopy     The Endoscopic procedure was explained to the patient in detail  The prep and NPO were explained  All the Risks, Benefits, and Alternatives were explained  Risk of Bleeding, Perforation and Cardio Respiratory risks were explained  her questions were answered  The procedure has been scheduled with the  in the office  Patient was asked to give us a call for any questions  The patient has verbalized understanding and agreement to this plan. Pt was advised in detail about some life style and dietary modifications. She was advised about avoidance of caffeine, nicotine and chocolate. Pt was also told to stay away from any kind of fast foods, soda pops.  She was also advised to avoid lots of spices, grease and fried food etc.     Instructions were also given about trying to arrange the timing, quality and quantity of food. Instructions were given about using ample amount of fiber including dietary and supplemental fiber either metamucil, bennafiber or citrucell etc.  Pt was advised about drinking ample amount of water without any colors or chemicals. Stress was given about regular exercise. Pt has verbalized understanding and agreement to these modifications. Pt seems to have signs and symptoms consistent with GERD, acid indigestion and heartburns. She was discussed  in detail about some possible life style and dietary modifications. She was stressed about the maintenance  of appropriate weight and effect of obesity contributing to reflux symptoms. Routine exercise was streesed. Avoidance of Caffeine, nicotine and chocolate were explained. Pt was asked to avoid spices grease and fried food. Advices were also given about avoidance of any kind of fast foods, soda pops and high energy drinks. Pt was advised to place two small block under the head end of the bed which may help with night time reflux. Was advised not to eat any thin at least 2-3 hrs before going to bed and walk especially after dinner    Pt has verbalized understanding and agreement to this plan.     The patient was instructed to start taking some OTC Probiotics products   These are available over the counter at the Pharmacy stores and Grocery stores  He was explained about the beneficial effects they have in the GI track  They will help to establish the good bacterial dick and will help with the digestion and bowel movements  The patient has verbalized understanding and agreement to this plan      More than half of patient's clinic visit time was spent in counseling about lifestyle and dietary modifications  Patient's  questions were answered in this regard as well  The patient has verbalized understanding and agreement       I communicated with the patient and/or health care decision maker about Details of this discussion including any medical advice provided:YES      I affirm this is a Patient Initiated Episode with an Established Patient who has not had a related appointment within my department in the past 7 days or scheduled within the next 24 hours. Total Time: minutes: 21-30 minutes    Note: not billable if this call serves to triage the patient into an appointment for the relevant concern      Thank you for allowing me to participate in the care of Ms. Bill. For any further questions please do not hesitate to contact me. I have reviewed and agree with the ROS entered by the MA/LPN.          Mel Martinez MD, [facility]  Board Certified in Gastroenterology and [address] [place] [address] Gastroenterology  Office #: [phone]

## 2021-12-08 ENCOUNTER — TELEPHONE (OUTPATIENT)
Dept: OBGYN CLINIC | Age: 56
End: 2021-12-08

## 2021-12-08 RX ORDER — METRONIDAZOLE 500 MG/1
500 TABLET ORAL 2 TIMES DAILY
Qty: 14 TABLET | Refills: 0 | Status: SHIPPED | OUTPATIENT
Start: 2021-12-08 | End: 2021-12-15

## 2021-12-08 RX ORDER — FLUCONAZOLE 100 MG/1
100 TABLET ORAL DAILY
Qty: 7 TABLET | Refills: 0 | Status: SHIPPED | OUTPATIENT
Start: 2021-12-08 | End: 2021-12-15

## 2021-12-08 NOTE — TELEPHONE ENCOUNTER
Patient reports that she has not been drinking many fluids. Patient instructed to drink more fluids to rehydrate and the dehydration is probably the cause of the dark urine. Patient does also complain of vaginal odor. Yes, since already has an appointment.  Can have her seen here first for evaluation since appointment is a week away to determine if it is something we can help with.

## 2021-12-08 NOTE — TELEPHONE ENCOUNTER
Patient is sick and can't come in at this time. She has dark urine and a vaginal odor  for the last 2 weeks. She asking for something to be called in.

## 2022-01-28 ENCOUNTER — HOSPITAL ENCOUNTER (OUTPATIENT)
Dept: LAB | Age: 57
Setting detail: SPECIMEN
Discharge: HOME | End: 2022-01-28
Payer: COMMERCIAL

## 2022-01-28 DIAGNOSIS — Z01.818 PREOP TESTING: Primary | ICD-10-CM

## 2022-01-28 PROCEDURE — U0005 INFEC AGEN DETEC AMPLI PROBE: HCPCS

## 2022-01-28 PROCEDURE — U0003 INFECTIOUS AGENT DETECTION BY NUCLEIC ACID (DNA OR RNA); SEVERE ACUTE RESPIRATORY SYNDROME CORONAVIRUS 2 (SARS-COV-2) (CORONAVIRUS DISEASE [COVID-19]), AMPLIFIED PROBE TECHNIQUE, MAKING USE OF HIGH THROUGHPUT TECHNOLOGIES AS DESCRIBED BY CMS-2020-01-R: HCPCS

## 2022-01-29 LAB
SARS-COV-2: NORMAL
SARS-COV-2: NOT DETECTED
SOURCE: NORMAL

## 2022-01-31 ENCOUNTER — TELEPHONE (OUTPATIENT)
Dept: GASTROENTEROLOGY | Age: 57
End: 2022-01-31

## 2022-01-31 NOTE — TELEPHONE ENCOUNTER
Pt left msg on ofc vm 1/31/22 @ 10:21am stating she has questions regarding her prep instructions. Return call to [phone]. Writer returned call to pt and spoke to pt over the phone asking pt what question she has regarding her prep instructions. Writer went prep instructions w/ pt and reminded pt of CLD today, no solid foods. Writer answered all pts questions and pt voices her understanding.

## 2022-02-01 ENCOUNTER — HOSPITAL ENCOUNTER (OUTPATIENT)
Age: 57
Setting detail: OUTPATIENT SURGERY
Discharge: HOME | End: 2022-02-01
Attending: INTERNAL MEDICINE | Admitting: INTERNAL MEDICINE
Payer: COMMERCIAL

## 2022-02-01 ENCOUNTER — ANESTHESIA EVENT (OUTPATIENT)
Dept: OPERATING ROOM | Age: 57
End: 2022-02-01
Payer: COMMERCIAL

## 2022-02-01 ENCOUNTER — ANESTHESIA (OUTPATIENT)
Dept: OPERATING ROOM | Age: 57
End: 2022-02-01
Payer: COMMERCIAL

## 2022-02-01 VITALS
SYSTOLIC BLOOD PRESSURE: 97 MMHG | OXYGEN SATURATION: 100 % | RESPIRATION RATE: 12 BRPM | DIASTOLIC BLOOD PRESSURE: 60 MMHG

## 2022-02-01 VITALS
DIASTOLIC BLOOD PRESSURE: 60 MMHG | HEART RATE: 81 BPM | OXYGEN SATURATION: 100 % | WEIGHT: 116 LBS | BODY MASS INDEX: 21.9 KG/M2 | SYSTOLIC BLOOD PRESSURE: 109 MMHG | TEMPERATURE: 97.7 F | HEIGHT: 61 IN | RESPIRATION RATE: 17 BRPM

## 2022-02-01 PROCEDURE — 45330 DIAGNOSTIC SIGMOIDOSCOPY: CPT | Performed by: INTERNAL MEDICINE

## 2022-02-01 PROCEDURE — 3609027000 HC COLONOSCOPY: Performed by: INTERNAL MEDICINE

## 2022-02-01 PROCEDURE — 3700000000 HC ANESTHESIA ATTENDED CARE: Performed by: INTERNAL MEDICINE

## 2022-02-01 PROCEDURE — 3609012400 HC EGD TRANSORAL BIOPSY SINGLE/MULTIPLE: Performed by: INTERNAL MEDICINE

## 2022-02-01 PROCEDURE — 6360000002 HC RX W HCPCS: Performed by: NURSE ANESTHETIST, CERTIFIED REGISTERED

## 2022-02-01 PROCEDURE — 2709999900 HC NON-CHARGEABLE SUPPLY: Performed by: INTERNAL MEDICINE

## 2022-02-01 PROCEDURE — 2580000003 HC RX 258: Performed by: ANESTHESIOLOGY

## 2022-02-01 PROCEDURE — 2500000003 HC RX 250 WO HCPCS: Performed by: NURSE ANESTHETIST, CERTIFIED REGISTERED

## 2022-02-01 PROCEDURE — 3700000001 HC ADD 15 MINUTES (ANESTHESIA): Performed by: INTERNAL MEDICINE

## 2022-02-01 PROCEDURE — 88305 TISSUE EXAM BY PATHOLOGIST: CPT

## 2022-02-01 PROCEDURE — 43239 EGD BIOPSY SINGLE/MULTIPLE: CPT | Performed by: INTERNAL MEDICINE

## 2022-02-01 PROCEDURE — 7100000011 HC PHASE II RECOVERY - ADDTL 15 MIN: Performed by: INTERNAL MEDICINE

## 2022-02-01 PROCEDURE — 7100000010 HC PHASE II RECOVERY - FIRST 15 MIN: Performed by: INTERNAL MEDICINE

## 2022-02-01 RX ORDER — ONDANSETRON 2 MG/ML
4 INJECTION INTRAMUSCULAR; INTRAVENOUS
Status: DISCONTINUED | OUTPATIENT
Start: 2022-02-01 | End: 2022-02-01 | Stop reason: HOSPADM

## 2022-02-01 RX ORDER — PROPOFOL 10 MG/ML
INJECTION, EMULSION INTRAVENOUS PRN
Status: DISCONTINUED | OUTPATIENT
Start: 2022-02-01 | End: 2022-02-01 | Stop reason: SDUPTHER

## 2022-02-01 RX ORDER — LIDOCAINE HYDROCHLORIDE 10 MG/ML
1 INJECTION, SOLUTION EPIDURAL; INFILTRATION; INTRACAUDAL; PERINEURAL
Status: DISCONTINUED | OUTPATIENT
Start: 2022-02-02 | End: 2022-02-01 | Stop reason: HOSPADM

## 2022-02-01 RX ORDER — SODIUM CHLORIDE, SODIUM LACTATE, POTASSIUM CHLORIDE, CALCIUM CHLORIDE 600; 310; 30; 20 MG/100ML; MG/100ML; MG/100ML; MG/100ML
INJECTION, SOLUTION INTRAVENOUS CONTINUOUS
Status: DISCONTINUED | OUTPATIENT
Start: 2022-02-02 | End: 2022-02-01 | Stop reason: HOSPADM

## 2022-02-01 RX ORDER — LIDOCAINE HYDROCHLORIDE 20 MG/ML
INJECTION, SOLUTION EPIDURAL; INFILTRATION; INTRACAUDAL; PERINEURAL PRN
Status: DISCONTINUED | OUTPATIENT
Start: 2022-02-01 | End: 2022-02-01 | Stop reason: SDUPTHER

## 2022-02-01 RX ADMIN — PROPOFOL 40 MG: 10 INJECTION, EMULSION INTRAVENOUS at 09:38

## 2022-02-01 RX ADMIN — SODIUM CHLORIDE, POTASSIUM CHLORIDE, SODIUM LACTATE AND CALCIUM CHLORIDE: 600; 310; 30; 20 INJECTION, SOLUTION INTRAVENOUS at 08:54

## 2022-02-01 RX ADMIN — SODIUM CHLORIDE, POTASSIUM CHLORIDE, SODIUM LACTATE AND CALCIUM CHLORIDE: 600; 310; 30; 20 INJECTION, SOLUTION INTRAVENOUS at 09:27

## 2022-02-01 RX ADMIN — PROPOFOL 30 MG: 10 INJECTION, EMULSION INTRAVENOUS at 09:42

## 2022-02-01 RX ADMIN — LIDOCAINE HYDROCHLORIDE 50 MG: 20 INJECTION, SOLUTION EPIDURAL; INFILTRATION; INTRACAUDAL; PERINEURAL at 09:28

## 2022-02-01 RX ADMIN — PROPOFOL 30 MG: 10 INJECTION, EMULSION INTRAVENOUS at 09:40

## 2022-02-01 ASSESSMENT — PULMONARY FUNCTION TESTS
PIF_VALUE: 0
PIF_VALUE: 1
PIF_VALUE: 0
PIF_VALUE: 1
PIF_VALUE: 0
PIF_VALUE: 1
PIF_VALUE: 0
PIF_VALUE: 1
PIF_VALUE: 0
PIF_VALUE: 1
PIF_VALUE: 1
PIF_VALUE: 0
PIF_VALUE: 1
PIF_VALUE: 0
PIF_VALUE: 4
PIF_VALUE: 0
PIF_VALUE: 0
PIF_VALUE: 1
PIF_VALUE: 0
PIF_VALUE: 1
PIF_VALUE: 0

## 2022-02-01 ASSESSMENT — PAIN DESCRIPTION - DESCRIPTORS: DESCRIPTORS: ACHING;CONSTANT

## 2022-02-01 ASSESSMENT — PAIN SCALES - GENERAL: PAINLEVEL_OUTOF10: 0

## 2022-02-01 ASSESSMENT — PAIN - FUNCTIONAL ASSESSMENT: PAIN_FUNCTIONAL_ASSESSMENT: 0-10

## 2022-02-01 NOTE — ANESTHESIA PRE PROCEDURE
Department of Anesthesiology  Preprocedure Note       Name:  Soledad Raza   Age:  64 y.o.  :  1965                                          MRN:  [id number]         Date:  2022      Surgeon: Fabby Page):  Sarah Sears MD    Procedure: Procedure(s):  EGD ESOPHAGOGASTRODUODENOSCOPY  COLONOSCOPY DIAGNOSTIC    Medications prior to admission:   Prior to Admission medications    Medication Sig Start Date End Date Taking? Authorizing Provider   cetirizine (ZYRTEC) 10 MG tablet take 1 tablet by mouth once daily 21   Historical Provider, MD   clonazePAM (KLONOPIN) 0.5 MG tablet take 1 AND 1/2 tablet by mouth once daily 21   Historical Provider, MD   ketotifen (ZADITOR) 0.025 % ophthalmic solution instill 1 drop into both eyes twice a day 21   Historical Provider, MD   Multiple Vitamins-Minerals ([address] FOR HER 50+) CAPS take 1 capsule by mouth once daily with food 21   Historical Provider, MD   ondansetron (ZOFRAN) 4 MG tablet TAKE ONE TABLET BY MOUTH EVERY 8 HOURS AS NEEDED 21   Historical Provider, MD   oxyCODONE-acetaminophen (PERCOCET) 5-325 MG per tablet take 1 tablet by mouth every 8 hours if needed  Patient not taking: Reported on 2021   Historical Provider, MD   tiZANidine (ZANAFLEX) 4 MG tablet  21   Historical Provider, MD   XELJANZ XR 11 MG TB24 TAKE ONE TABLET BY MOUTH EVERY DAY  Patient not taking: Reported on 2021 8/3/21   Historical Provider, MD   albuterol sulfate HFA (VENTOLIN HFA) 108 (90 Base) MCG/ACT inhaler Inhale 2 puffs into the lungs 2 times daily 9/15/21   Clydene Mess, APRN - CNP   ondansetron (ZOFRAN ODT) 4 MG disintegrating tablet Take 1 tablet by mouth every 8 hours as needed for Nausea or Vomiting 9/15/21   Clydene Mess, APRN - CNP   nystatin (MYCOSTATIN) 936818 UNIT/GM cream Apply topically 2 times daily.  9/15/21   Clydene Mess, APRN - CNP   estradiol (ESTRACE) 1 MG tablet take 1 tablet by mouth once daily 3/12/20   MARNI Ross - CNP   cloNIDine (CATAPRES) 0.1 MG tablet 0.1 mg 1/13/20   Historical Provider, MD   furosemide (LASIX) 20 MG tablet 20 mg 1/13/20   Historical Provider, MD   valACYclovir (VALTREX) 500 MG tablet Take 1 tablet by mouth daily 10/10/18   Jose Guadalupe Morris MD   estradiol (ESTRACE VAGINAL) 0.1 MG/GM vaginal cream Place 1 g vaginally daily 6/1/18   Jose Guadalupe Morris MD   RA VITAMIN B-12 TR 1000 MCG TBCR  9/29/17   Historical Provider, MD   Pyridoxine HCl (VITAMIN B-6) 25 MG tablet  9/29/17   Historical Provider, MD   ranitidine (ZANTAC) 150 MG tablet  11/4/16   Historical Provider, MD   topiramate (TOPAMAX) 50 MG tablet Take 1 tablet by mouth 2 times daily  Patient taking differently: Take 100 mg by mouth 2 times daily  9/20/16   Bunny Clayton MD   VENTOLIN  (90 BASE) MCG/ACT inhaler Inhale 2 puffs into the lungs every 4 hours as needed  8/23/16   Historical Provider, MD   lamoTRIgine (LAMICTAL) 200 MG tablet Take 1 tablet by mouth 2 times daily  10/19/15   Historical Provider, MD   methotrexate (RHEUMATREX) 2.5 MG chemo tablet Take 12.5 mg by mouth once a week Indications: (Pt takes 5 tabs = 12.5 mg every Tuesday) (Pt takes 5 tabs = 12.5 mg every Tuesday) 9/15/15   Historical Provider, MD   gabapentin (NEURONTIN) 800 MG tablet Take 1 tablet by mouth three times daily. 1/29/15   Historical Provider, MD   pantoprazole (PROTONIX) 20 MG tablet Take 1 tablet by mouth daily. 1/29/15   Historical Provider, MD   hydrochlorothiazide (HYDRODIURIL) 25 MG tablet Take 25 mg by mouth daily. 10/24/13   Historical Provider, MD   levothyroxine (SYNTHROID) 75 MCG tablet Take 1 tablet by mouth daily. On empty stomach 3/26/13   Alicia Zamudio MD   folic acid (FOLVITE) 1 MG tablet Take 1 mg by mouth daily. Nitesh Ramirez MD       Current medications:    No current facility-administered medications for this encounter. Allergies:     Allergies   Allergen Reactions    Latex     Aspirin     Cortisone     Dye Social History     Tobacco Use    Smoking status: Never Smoker    Smokeless tobacco: Never Used    Tobacco comment: x9yrs ago cigs. Substance Use Topics    Alcohol use: No     Alcohol/week: 0.0 standard drinks     Comment: recovering alch. since 2000                                Counseling given: Not Answered  Comment: x9yrs ago cigs. Vital Signs (Current):   Vitals:    02/01/22 0818   BP: 110/66   Pulse: 71   Resp: 18   Temp: 97.5 °F (36.4 °C)   TempSrc: Temporal   SpO2: 100%                                              BP Readings from Last 3 Encounters:   02/01/22 110/66   10/15/21 (!) 135/95   09/15/21 103/71       NPO Status:                                                                                 BMI:   Wt Readings from Last 3 Encounters:   12/01/21 117 lb (53.1 kg)   10/15/21 125 lb (56.7 kg)   09/15/21 122 lb (55.3 kg)     There is no height or weight on file to calculate BMI.    CBC:   Lab Results   Component Value Date    WBC 5.4 07/11/2021    RBC 3.18 07/11/2021    RBC 3.16 03/23/2012    HGB 10.3 07/11/2021    HCT 32.0 07/11/2021    .6 07/11/2021    RDW 13.3 07/11/2021     07/11/2021     03/23/2012       CMP:   Lab Results   Component Value Date     07/11/2021    K 3.8 07/11/2021     07/11/2021    CO2 24 07/11/2021    BUN 10 07/11/2021    CREATININE 0.42 07/11/2021    GFRAA >60 07/11/2021    LABGLOM >60 07/11/2021    GLUCOSE 116 07/11/2021    GLUCOSE 87 03/23/2012    PROT 6.7 01/16/2021    CALCIUM 8.6 07/11/2021    BILITOT 0.55 01/16/2021    ALKPHOS 73 01/16/2021    AST 15 01/16/2021    ALT 7 01/16/2021       POC Tests: No results for input(s): POCGLU, POCNA, POCK, POCCL, POCBUN, POCHEMO, POCHCT in the last 72 hours.     Coags:   Lab Results   Component Value Date    PROTIME 10.3 09/07/2017    INR 1.0 09/07/2017    APTT 28.5 09/07/2017       HCG (If Applicable):   Lab Results   Component Value Date    HCG NEGATIVE 08/30/2012        ABGs: No results found for: PHART, PO2ART, CED2MCJ, UQZ5OBW, BEART, S1FPXQQW     Type & Screen (If Applicable):  No results found for: LABABO, LABRH    Drug/Infectious Status (If Applicable):  No results found for: HIV, HEPCAB    COVID-19 Screening (If Applicable):   Lab Results   Component Value Date    COVID19 Not Detected 01/28/2022           Anesthesia Evaluation    Airway: Mallampati: I  TM distance: >3 FB   Neck ROM: full  Mouth opening: > = 3 FB Dental:          Pulmonary:                              Cardiovascular:                Echocardiogram reviewed  Stress test reviewed                Neuro/Psych:   (+) seizures:,             GI/Hepatic/Renal:             Endo/Other:                     Abdominal:             Vascular:           Other Findings:             Anesthesia Plan      general     ASA 3                                 Ben Deutsch MD   2/1/2022

## 2022-02-01 NOTE — H&P
History and Physical Service   [facility]    HISTORY AND PHYSICAL EXAMINATION            Date of Evaluation: 2/1/2022  Patient name:  Bryant Angel  MRN:   [id number]  YOB: 1965  PCP:    Flores MARTÍNEZ, MARNI Portillo NP    History Obtained From:     Patient, medical records     History of Present Illness: This is Bryant Angel a 64 y.o. female who presents today for a diagnostic colonoscopy and EGD by Dr. Geneva Sow for hx colon polyps, IBS, abdominal pain and GERD. The patient has seen GI in the past. She was recently evaluated by Dr Geneva Sow 12/1/21 for c/o hard stools with intermittent rectal bleeding. Previous colonoscopy with Hx tubulovillous adenoma 2016. Patient admits acid reflux with bloating and nausea and takes Zantac and Protonix. Patient was advised diagnostic testing and arrives for her scheduled procedures. She completed the prep as directed until watery clear. Admits \"I didn't sleep at all last night FH history of colon cancer or polyps. Patient c/o abdominal pain, feeling very tired, Hx IBS with loose stools alternating with hard. Denies fever, chills, night sweats, pain or unexplained weight loss.  +Hx hemorrhoids     Past Medical History:     Past Medical History:   Diagnosis Date    Anxiety     Anxiety     Asthma     Cerebral artery occlusion with cerebral infarction ([address]     Cervical pain (neck)     Depression     Depression     Epilepsy ([address]     History of blood transfusion     History of herpes genitalis 4/10/2007    see lab work scanned    Hypertension     Hypothyroidism     Insomnia     Internal hemorrhoids     Irritable bowel syndrome     Lupus ([address]     Menarche @ 14 y/o    MVP (mitral valve prolapse)     Parity     G 4 P 3  -  3 c-sections,1 ectopic    Rheumatoid arthritis ([address]     Seizures ([address] 9/20/2016    Tubulovillous adenoma     Vitamin D deficiency         Past Surgical History:     Past Surgical History: Procedure Laterality Date    CARPAL TUNNEL RELEASE      both    CERVIX BIOPSY       SECTION       X 3    COLONOSCOPY  8/29/11    3/5/12    DILATION AND CURETTAGE  7/15/2010    D/T menorrhagia per Dr Parminder Robledo  10/11/2010    laparoscopic supracervical hyst with ovarian  preservation bilaterally - per Dr Cain Covarrubias  7/15/2010    with D&C per Dr Tres Frost replacement Left/   [facility]    NECK SURGERY      disc removed  from neck  [facility]    NERVE SURGERY      both arms nerve repair    OTHER SURGICAL HISTORY      buttock proc. due to inf.  SIGMOIDOSCOPY  16    TUBULOVILLOUS ADENOMA; flexible; diminutive polypectomy, sm. int. hemorrhoids    UPPER GASTROINTESTINAL ENDOSCOPY  11    X-RAY FOOT 3+VW          Medications Prior to Admission:     Prior to Admission medications    Medication Sig Start Date End Date Taking?  Authorizing Provider   ketotifen (ZADITOR) 0.025 % ophthalmic solution instill 1 drop into both eyes twice a day 21  Yes Historical Provider, MD   VENTOLIN  (90 BASE) MCG/ACT inhaler Inhale 2 puffs into the lungs every 4 hours as needed  16  Yes Historical Provider, MD   cetirizine (ZYRTEC) 10 MG tablet take 1 tablet by mouth once daily 21   Historical Provider, MD   Multiple Vitamins-Minerals (MULTI FOR HER 50+) CAPS take 1 capsule by mouth once daily with food 21   Historical Provider, MD   ondansetron (ZOFRAN) 4 MG tablet TAKE ONE TABLET BY MOUTH EVERY 8 HOURS AS NEEDED 21   Historical Provider, MD   tiZANidine (ZANAFLEX) 4 MG tablet  21   Historical Provider, MD   albuterol sulfate HFA (VENTOLIN HFA) 108 (90 Base) MCG/ACT inhaler Inhale 2 puffs into the lungs 2 times daily 9/15/21   MARNI Simon CNP   ondansetron (ZOFRAN ODT) 4 MG disintegrating tablet Take 1 tablet by mouth every 8 hours as needed for Nausea or Vomiting 9/15/21   MARNI Simon - MORGAN   nystatin (MYCOSTATIN) 014103 UNIT/GM cream Apply topically 2 times daily. 9/15/21   MARNI Guerin CNP   estradiol (ESTRACE) 1 MG tablet take 1 tablet by mouth once daily 3/12/20   MARNI Guerin CNP   cloNIDine (CATAPRES) 0.1 MG tablet 0.1 mg 1/13/20   Historical Provider, MD   furosemide (LASIX) 20 MG tablet 20 mg 1/13/20   Historical Provider, MD   valACYclovir (VALTREX) 500 MG tablet Take 1 tablet by mouth daily 10/10/18   Julia Borden MD   estradiol (ESTRACE VAGINAL) 0.1 MG/GM vaginal cream Place 1 g vaginally daily 6/1/18   Julia Borden MD   RA VITAMIN B-12 TR 1000 MCG TBCR  9/29/17   Historical Provider, MD   Pyridoxine HCl (VITAMIN B-6) 25 MG tablet  9/29/17   Historical Provider, MD   ranitidine (ZANTAC) 150 MG tablet  11/4/16   Historical Provider, MD   topiramate (TOPAMAX) 50 MG tablet Take 1 tablet by mouth 2 times daily  Patient taking differently: Take 100 mg by mouth 2 times daily  9/20/16   Julissa Jamil MD   lamoTRIgine (LAMICTAL) 200 MG tablet Take 1 tablet by mouth 2 times daily  10/19/15   Historical Provider, MD   methotrexate (RHEUMATREX) 2.5 MG chemo tablet Take 12.5 mg by mouth once a week Indications: (Pt takes 5 tabs = 12.5 mg every Tuesday) (Pt takes 5 tabs = 12.5 mg every Tuesday) 9/15/15   Historical Provider, MD   gabapentin (NEURONTIN) 800 MG tablet Take 1 tablet by mouth three times daily. 1/29/15   Historical Provider, MD   pantoprazole (PROTONIX) 20 MG tablet Take 1 tablet by mouth daily. 1/29/15   Historical Provider, MD   hydrochlorothiazide (HYDRODIURIL) 25 MG tablet Take 25 mg by mouth daily. 10/24/13   Historical Provider, MD   levothyroxine (SYNTHROID) 75 MCG tablet Take 1 tablet by mouth daily. On empty stomach 3/26/13   Elbert Jordan MD   folic acid (FOLVITE) 1 MG tablet Take 1 mg by mouth daily.       Nick Ormond, MD        Allergies:     Latex, Aspirin, Cortisone, Dye [iodides], Ibuprofen, Influenza vaccines, and Penicillins    Social History: Tobacco:    reports that she has quit smoking. She has never used smokeless tobacco.  Alcohol:      reports no history of alcohol use. Drug Use:  reports no history of drug use. Family History:     Family History   Problem Relation Age of Onset    High Blood Pressure Other     Heart Disease Other     Emphysema Other     COPD Other     Cancer Other         colon    Heart Disease Father     Hypertension Father     Cancer Father     Cancer Mother     Cancer Paternal Uncle     Cancer Maternal Grandmother     Cancer Maternal Grandfather        Review of Systems:     Positive and Negative as described in HPI. CONSTITUTIONAL:  negative for fevers, chills, sweats, fatigue, weight loss  HEENT:  Glasses at home  Dentures negative for vision, hearing changes, runny nose, throat pain  RESPIRATORY:  negative for shortness of breath, cough, congestion, wheezing. CARDIOVASCULAR: Hx HTN treated with medication Has chest discomfort sometimes   negative for chest pain, palpitations. GASTROINTESTINAL: See HPI    GENITOURINARY:  negative for difficulty of urination, burning with urination, frequency   INTEGUMENT:  negative for rash, skin lesions, easy bruising   HEMATOLOGIC/LYMPHATIC:  negative for swelling/edema   ALLERGIC/IMMUNOLOGIC: Hx lupus and RA follows with rheumatologist at [facility]. On daily rheumatrex  negative for urticaria , itching  ENDOCRINE: Hx hypothyroid on medication and followed by  PCP negative increase in drinking, increase in urination, hot or cold intolerance  MUSCULOSKELETAL: arthralgia spine and joints negative joint pains, muscle aches, swelling of joints  NEUROLOGICAL: Hx \"three types of seizures\" Grand mal > 2months ago  Occasional headaches No dizziness, lightheadedness, numbness, pain, tingling extremities  BEHAVIOR/PSYCH: + anxiety and depression  Sleeps poorly     Physical Exam:   /66   Pulse 71   Temp 97.5 °F (36.4 °C) (Temporal)   Resp 18   Ht 5' 1\" (1.549 m)   Wt 116 lb

## 2022-02-01 NOTE — ANESTHESIA POSTPROCEDURE EVALUATION
Department of Anesthesiology  Postprocedure Note    Patient: Shelley Cunha  MRN: [id number]  YOB: 1965  Date of evaluation: 2/1/2022  Time:  12:52 PM     Procedure Summary     Date: 02/01/22 Room / Location: [place] [address] / [facility] - INPATIENT    Anesthesia Start: 8568 Anesthesia Stop: 6308    Procedures:       EGD BIOPSY (N/A )      ATTEMPTED COLONOSCOPY POOR PREP (N/A ) Diagnosis: (DX GERD   HX OF COLON POLYPS    IBS     ABDOMINAL PAIN)    Surgeons: Paula Santoro MD Responsible Provider: Misti Gutierrez MD    Anesthesia Type: general ASA Status: 3          Anesthesia Type: general    Elizabeth Phase I:      Elizabeth Phase II: Elizabeth Score: 10    Last vitals: Reviewed and per EMR flowsheets.        Anesthesia Post Evaluation    Complications: no

## 2022-02-01 NOTE — OP NOTE
PROCEDURE NOTE    DATE OF PROCEDURE: 2/1/2022    SURGEON: Osmany Umanzor MD    ASSISTANT: None    PREOPERATIVE DIAGNOSIS: IBS  SCREENING    POSTOPERATIVE DIAGNOSIS: as described below    OPERATION: FLEXIBLE SIGMOIDOSCOPY/ ATTEMPTED Total colonoscopy     ANESTHESIA: MAC PER ANESTHESIA     ESTIMATED BLOOD LOSS: less than 50     COMPLICATIONS: None. SPECIMENS:  Was Not Obtained    HISTORY: The patient is a 64y.o. year old female with history of above preop diagnosis. I recommended colonoscopy with possible biopsy or polypectomy and I explained the risk, benefits, expected outcome, and alternatives to the procedure. Risks included but are not limited to bleeding, infection, respiratory distress, hypotension, and perforation of the colon and possibility of missing a lesion. The patient understands and is in agreement. PROCEDURE: The patient was given IV conscious sedation. The patient's SPO2 remained above 90% throughout the procedure. The colonoscope was inserted per rectum and advanced under direct vision to the25 CM. The prep was poor. Findings:  Sigmoid colon: abnormal: RETAINED STOOLS    Rectum/Anus: examined in normal and retroflexed positions and was abnormal: RETAINED FORMED STOOLS        The colon was decompressed and the scope was removed. The patient tolerated the procedure well. Recommendations/Plan:   1. RE PREP AND RE SCHEDULE  2. Discussed with the family    3. POST SEDATION PATIENT WAS STABLE WITH STABLE VITAL SIGNS AND OXYGEN SATURATIONS AND WAS DISCHARGED HOME WITH RIDE IN A STABLE CONDITION.     Electronically signed by Osmany Umanzor MD  on 2/1/2022 at 9:48 AM

## 2022-02-01 NOTE — OP NOTE
PROCEDURE NOTE    DATE OF PROCEDURE: 2/1/2022     SURGEON: Sarah Sears MD    ASSISTANT: None    PREOPERATIVE DIAGNOSIS: GERD  ABD PAINS  IBS    POSTOPERATIVE DIAGNOSIS: As described below    OPERATION: Upper GI endoscopy with Biopsy    ANESTHESIA: MAC PER ANESTHESIA     ESTIMATED BLOOD LOSS: Less than 50 ml    COMPLICATIONS: None. SPECIMENS:  Was Obtained:     HISTORY: The patient is a 64y.o. year old female with history of above preop diagnosis. I recommended esophagogastroduodenoscopy with possible biopsy and I explained the risk, benefits, expected outcome, and alternatives to the procedure. Risks included but are not limited to bleeding, infection, respiratory distress, hypotension, and perforation of the esophagus, stomach, or duodenum. Patient understands and is in agreement. PROCEDURE: The patient was given IV conscious sedation. The patient's SPO2 remained above 90% throughout the procedure. The gastroscope was inserted orally and advanced under direct vision through the esophagus, through the stomach, through the pylorus, and into the descending duodenum. Findings:    Retropharyngeal area was grossly normal appearing    Esophagus: abnormal: MILD IRREGULAR SCJ WAS BIOPSIED AND PICTURES WERE TAKEN    Stomach:    Fundus: normal    Body: normal    Antrum: abnormal: MILD GASTRITIS WAS BIOPSIED    Duodenum:     Descending: normal RANDOM BIOPSIES WERE TAKEN     Bulb: normal    The scope was removed and the patient tolerated the procedure well. Recommendations/Plan:   1. F/U Biopsies  2. F/U In Office in 3-4 weeks  3. Discussed with the family  4.  Post sedation patient was stable with stable vital signs and stable O2 saturations    Electronically signed by Sarah Sears MD  on 2/1/2022 at 9:44 AM

## 2022-02-02 LAB — SURGICAL PATHOLOGY REPORT: NORMAL

## 2022-02-02 NOTE — TELEPHONE ENCOUNTER
Writer called and lvm requesting call back to discuss scheduling colon repeat. Will attempt to call pt one more time.

## 2022-03-17 ENCOUNTER — OFFICE VISIT (OUTPATIENT)
Dept: OBGYN CLINIC | Age: 57
End: 2022-03-17
Payer: COMMERCIAL

## 2022-03-17 VITALS
DIASTOLIC BLOOD PRESSURE: 78 MMHG | WEIGHT: 126 LBS | SYSTOLIC BLOOD PRESSURE: 116 MMHG | HEIGHT: 61 IN | HEART RATE: 82 BPM | BODY MASS INDEX: 23.79 KG/M2

## 2022-03-17 DIAGNOSIS — R35.0 URINARY FREQUENCY: Primary | ICD-10-CM

## 2022-03-17 DIAGNOSIS — L65.9 HAIR LOSS: ICD-10-CM

## 2022-03-17 LAB
BILIRUBIN, POC: NORMAL
BLOOD URINE, POC: NORMAL
CLARITY, POC: NORMAL
COLOR, POC: YELLOW
GLUCOSE URINE, POC: NORMAL
KETONES, POC: NORMAL
LEUKOCYTE EST, POC: NORMAL
NITRITE, POC: NORMAL
PH, POC: 6
PROTEIN, POC: NORMAL
SPECIFIC GRAVITY, POC: 1
UROBILINOGEN, POC: NORMAL

## 2022-03-17 PROCEDURE — G8427 DOCREV CUR MEDS BY ELIG CLIN: HCPCS | Performed by: SPECIALIST

## 2022-03-17 PROCEDURE — G8484 FLU IMMUNIZE NO ADMIN: HCPCS | Performed by: SPECIALIST

## 2022-03-17 PROCEDURE — 81003 URINALYSIS AUTO W/O SCOPE: CPT | Performed by: SPECIALIST

## 2022-03-17 PROCEDURE — G8420 CALC BMI NORM PARAMETERS: HCPCS | Performed by: SPECIALIST

## 2022-03-17 PROCEDURE — 3017F COLORECTAL CA SCREEN DOC REV: CPT | Performed by: SPECIALIST

## 2022-03-17 PROCEDURE — 1036F TOBACCO NON-USER: CPT | Performed by: SPECIALIST

## 2022-03-17 PROCEDURE — 99213 OFFICE O/P EST LOW 20 MIN: CPT | Performed by: SPECIALIST

## 2022-03-17 RX ORDER — NITROFURANTOIN 25; 75 MG/1; MG/1
100 CAPSULE ORAL 2 TIMES DAILY
Qty: 20 CAPSULE | Refills: 0 | Status: SHIPPED | OUTPATIENT
Start: 2022-03-17 | End: 2022-03-27

## 2022-03-17 ASSESSMENT — ENCOUNTER SYMPTOMS
ABDOMINAL PAIN: 0
NAUSEA: 0
EYE PAIN: 0
DIARRHEA: 0
COUGH: 0
VOMITING: 0
CONSTIPATION: 0
ABDOMINAL DISTENTION: 0
ROS SKIN COMMENTS: HAIR LOSS
APNEA: 0

## 2022-03-17 NOTE — PROGRESS NOTES
Subjective:      Patient ID: Alan Vazquez is a 64 y.o. female. Chief Complaint   Patient presents with    Urinary Frequency     /78 (Site: Left Upper Arm, Position: Sitting, Cuff Size: Medium Adult)   Pulse 82   Ht 5' 1\" (1.549 m)   Wt 126 lb (57.2 kg)   LMP 08/05/2007 (Within Years)   Breastfeeding No   BMI 23.81 kg/m²   Patient's last menstrual period was 08/05/2007 (within years).     [id number]    Past Medical History:   Diagnosis Date    Anxiety     Anxiety     Asthma     [facility]erebral artery occlusion with [facility] infarction ([facility]     Cervical pain (neck)     Depression     Depression     Epilepsy ([facility]     History of blood transfusion     History of herpes genitalis 4/10/2007    see lab work scanned    Hypertension     Hypothyroidism     Insomnia     Internal hemorrhoids     Irritable bowel syndrome     Lupus ([facility]     Menarche @ 14 y/o    MVP (mitral valve prolapse)     Parity     G 4 P 3  -  3 c-sections,1 ectopic    Rheumatoid arthritis ([facility]     Seizures ([facility] 9/20/2016    Tubulovillous adenoma     Vitamin D deficiency      Current Outpatient Medications Ordered in Epic   Medication Sig Dispense Refill    nitrofurantoin, macrocrystal-monohydrate, (MACROBID) 100 MG capsule Take 1 capsule by mouth 2 times daily for 20 doses 20 capsule 0    cetirizine (ZYRTEC) 10 MG tablet take 1 tablet by mouth once daily      ketotifen (ZADITOR) 0.025 % ophthalmic solution instill 1 drop into both eyes twice a day      Multiple Vitamins-Minerals (MULTI FOR HER 50+) CAPS take 1 capsule by mouth once daily with food      ondansetron (ZOFRAN) 4 MG tablet TAKE ONE TABLET BY MOUTH EVERY 8 HOURS AS NEEDED      tiZANidine (ZANAFLEX) 4 MG tablet       albuterol sulfate HFA (VENTOLIN HFA) 108 (90 Base) MCG/ACT inhaler Inhale 2 puffs into the lungs 2 times daily 54 g 0    ondansetron (ZOFRAN ODT) 4 MG disintegrating tablet Take 1 tablet by mouth every 8 hours as needed for Nausea or Vomiting 30 tablet 2    nystatin (MYCOSTATIN) 908311 UNIT/GM cream Apply topically 2 times daily. 30 g 1    estradiol (ESTRACE) 1 MG tablet take 1 tablet by mouth once daily 28 tablet 1    cloNIDine (CATAPRES) 0.1 MG tablet 0.1 mg      furosemide (LASIX) 20 MG tablet 20 mg      valACYclovir (VALTREX) 500 MG tablet Take 1 tablet by mouth daily 30 tablet 2    estradiol (ESTRACE VAGINAL) 0.1 MG/GM vaginal cream Place 1 g vaginally daily 1 Tube 3    RA VITAMIN B-12 TR 1000 MCG TBCR   0    Pyridoxine HCl (VITAMIN B-6) 25 MG tablet   0    ranitidine (ZANTAC) 150 MG tablet   0    topiramate (TOPAMAX) 50 MG tablet Take 1 tablet by mouth 2 times daily (Patient taking differently: Take 100 mg by mouth 2 times daily ) 60 tablet 3    VENTOLIN  (90 BASE) MCG/ACT inhaler Inhale 2 puffs into the lungs every 4 hours as needed   0    lamoTRIgine (LAMICTAL) 200 MG tablet Take 1 tablet by mouth 2 times daily   1    methotrexate (RHEUMATREX) 2.5 MG chemo tablet Take 12.5 mg by mouth once a week Indications: (Pt takes 5 tabs = 12.5 mg every Tuesday) (Pt takes 5 tabs = 12.5 mg every Tuesday)  0    gabapentin (NEURONTIN) 800 MG tablet Take 1 tablet by mouth three times daily.  pantoprazole (PROTONIX) 20 MG tablet Take 1 tablet by mouth daily.  hydrochlorothiazide (HYDRODIURIL) 25 MG tablet Take 25 mg by mouth daily.  levothyroxine (SYNTHROID) 75 MCG tablet Take 1 tablet by mouth daily. On empty stomach 30 tablet 5    folic acid (FOLVITE) 1 MG tablet Take 1 mg by mouth daily. No current Epic-ordered facility-administered medications on file.      Problem List Items Addressed This Visit     None      Visit Diagnoses     Urinary frequency    -  Primary    Relevant Orders    POCT Urinalysis No Micro (Auto)    Hair loss            Allergies   Allergen Reactions    Latex     Aspirin     Cortisone     Dye [Iodides] Other (See Comments)     IV dye leaked into arm and caused local swelling in that area   [place] Ibuprofen     Influenza Vaccines      On orencia, for RA    Penicillins      Orders Placed This Encounter   Procedures    POCT Urinalysis No Micro (Auto)     HPI:  Patient is here today complaining of urinary frequency and odor. She also states that her pee is cloudy. She is also complaining of hair loss. She states that her hair is falling out in \"gobs\". She states that she is taking Prednisone and the hair loss has been since she started this. She states she was tested for diabetes and does not have it. She is also under a lot of stress and her son and his family are moving into a hotel this weekend because they are about to be homeless. Patient has several family issues. Review of Systems   Constitutional: Negative for activity change, appetite change and fever. HENT: Negative for ear discharge and ear pain. Eyes: Negative for pain and visual disturbance. Respiratory: Negative for apnea and cough. Cardiovascular: Negative for chest pain, palpitations and leg swelling. Gastrointestinal: Negative for abdominal distention, abdominal pain, constipation, diarrhea, nausea and vomiting. Endocrine: Negative. Genitourinary: Positive for frequency (with odor). Negative for difficulty urinating, dysuria, menstrual problem and pelvic pain. Musculoskeletal: Negative for neck pain and neck stiffness. Skin: Negative. Hair loss   Neurological: Negative for light-headedness and numbness. Hematological: Negative. Does not bruise/bleed easily. Objective:   Physical Exam  Vitals and nursing note reviewed. Constitutional:       Appearance: She is well-developed. Skin:     General: Skin is dry. Neurological:      Mental Status: She is alert and oriented to person, place, and time. Psychiatric:         Behavior: Behavior normal.         Thought Content: Thought content normal.         Assessment:      Patient with urinary frequency and odor.   Urinalysis in office today is negative for UTI. Patient as advised to drink more water. Due to patient's symptoms, will treat clinical UTI with Macrobid. Patient with hair loss. Explained to patient that stress or the medication may be the cause of her hair loss, but not necessarily at the amount that she describes. Patient was advised to see her family physician for further evaluation and to discuss medications and possible referral to a dermatologist.    Patient post supracervical hysterectomy. Last pap smear in 2019 shows ASCUS. Patient advised to schedule an appointment for annual exam with pap smear. Plan:       Orders Placed This Encounter   Medications    nitrofurantoin, macrocrystal-monohydrate, (MACROBID) 100 MG capsule     Sig: Take 1 capsule by mouth 2 times daily for 20 doses     Dispense:  20 capsule     Refill:  0      Appointment for annual exam with pap smear. Ericka Closs, am scribing for, and in the presence of Dr. Yoana Green. Electronically signed by: Adlo Gale 3/17/22 2:03 PM       I agree to the above documentation placed by my scribe Aldo Gale. I reviewed the scribe's note and agree with the documented findings and plan of care. Any areas of disagreement are noted on the chart. I have personally evaluated this patient. Additional findings are as noted. I agree with the chief complaint, past medical history, past surgical history, allergies, medications, social and family history as documented unless otherwise noted below.      Electronically signed by Yoana Green MD on 3/18/2022 at 9:38 AM

## 2022-03-18 PROBLEM — R35.0 URINARY FREQUENCY: Status: ACTIVE | Noted: 2022-03-18

## 2022-03-18 PROBLEM — L65.9 HAIR LOSS: Status: ACTIVE | Noted: 2022-03-18

## 2022-03-23 ENCOUNTER — OFFICE VISIT (OUTPATIENT)
Dept: GASTROENTEROLOGY | Age: 57
End: 2022-03-23
Payer: COMMERCIAL

## 2022-03-23 VITALS
TEMPERATURE: 97 F | WEIGHT: 120 LBS | DIASTOLIC BLOOD PRESSURE: 68 MMHG | BODY MASS INDEX: 22.67 KG/M2 | SYSTOLIC BLOOD PRESSURE: 108 MMHG | HEART RATE: 78 BPM

## 2022-03-23 DIAGNOSIS — D36.9 TUBULOVILLOUS ADENOMA: ICD-10-CM

## 2022-03-23 DIAGNOSIS — K64.8 INTERNAL HEMORRHOIDS: ICD-10-CM

## 2022-03-23 DIAGNOSIS — K58.8 OTHER IRRITABLE BOWEL SYNDROME: ICD-10-CM

## 2022-03-23 DIAGNOSIS — K21.9 GASTROESOPHAGEAL REFLUX DISEASE, UNSPECIFIED WHETHER ESOPHAGITIS PRESENT: Primary | ICD-10-CM

## 2022-03-23 PROCEDURE — 3017F COLORECTAL CA SCREEN DOC REV: CPT | Performed by: INTERNAL MEDICINE

## 2022-03-23 PROCEDURE — G8427 DOCREV CUR MEDS BY ELIG CLIN: HCPCS | Performed by: INTERNAL MEDICINE

## 2022-03-23 PROCEDURE — 1036F TOBACCO NON-USER: CPT | Performed by: INTERNAL MEDICINE

## 2022-03-23 PROCEDURE — G8420 CALC BMI NORM PARAMETERS: HCPCS | Performed by: INTERNAL MEDICINE

## 2022-03-23 PROCEDURE — G8484 FLU IMMUNIZE NO ADMIN: HCPCS | Performed by: INTERNAL MEDICINE

## 2022-03-23 PROCEDURE — 99214 OFFICE O/P EST MOD 30 MIN: CPT | Performed by: INTERNAL MEDICINE

## 2022-03-23 ASSESSMENT — ENCOUNTER SYMPTOMS
ABDOMINAL PAIN: 1
SORE THROAT: 0
DIARRHEA: 1
CONSTIPATION: 1
RECTAL PAIN: 0
SHORTNESS OF BREATH: 0
BLOOD IN STOOL: 1
ANAL BLEEDING: 1
VOMITING: 0
COUGH: 0
VOICE CHANGE: 0
CHOKING: 0
ABDOMINAL DISTENTION: 1
WHEEZING: 0
TROUBLE SWALLOWING: 1
NAUSEA: 0

## 2022-03-23 NOTE — PROGRESS NOTES
GI CLINIC FOLLOW UP    NTERVAL HISTORY:   No referring provider defined for this encounter. Chief Complaint   Patient presents with    Follow-up     Pt is here today for a f/u on EGD proc. 1. Gastroesophageal reflux disease, unspecified whether esophagitis present    2. Internal hemorrhoids    3. Tubulovillous adenoma    4. Other irritable bowel syndrome       The patient is here as a follow up of her recent GI procedure. The results have been sent to you separately   The findings were explained to the patient in detail and biopsies were also discussed   with her    Patient seen my office as a follow-up she had a recent upper endoscopy revealing some esophagitis and gastritis    She was also scheduled for colonoscopy but she was not cleanout well    She is here to be rescheduled for that    She has history of rheumatoid arthritis of irritable bowel syndrome-like symptoms    Patient has been complaining of some abdominal pains, off and on cramping  Also complains of abdominal bloating and gas  Has off and on nausea without any sig vomiting  Has some alternating constipation and diarrhea  Has no weight loss  Has some anxiety issues      Taking pantoprazole for GERD    HISTORY OF PRESENT ILLNESS: Ms.Cheryl Joaquín Henley is a 64 y.o. female with a past history remarkable for , referred for evaluation of   Chief Complaint   Patient presents with    Follow-up     Pt is here today for a f/u on EGD proc. Meryle Sandman Past Medical,Family, and Social History reviewed and does contribute to the patient presenting condition. Patient's PMH/PSH,SH,PSYCH Hx, MEDs, ALLERGIES, and ROS were all reviewed and updated in the appropriate sections.     PAST MEDICAL HISTORY:  Past Medical History:   Diagnosis Date    Anxiety     Anxiety     Asthma     Cerebral artery occlusion with cerebral infarction ([facility] [address]     Cervical pain (neck)     Depression     Depression     Epilepsy ([facility] [address]     History of blood transfusion Disp: , Rfl:     Multiple Vitamins-Minerals (MULTI FOR HER 50+) CAPS, take 1 capsule by mouth once daily with food, Disp: , Rfl:     ondansetron (ZOFRAN) 4 MG tablet, TAKE ONE TABLET BY MOUTH EVERY 8 HOURS AS NEEDED, Disp: , Rfl:     tiZANidine (ZANAFLEX) 4 MG tablet, , Disp: , Rfl:     albuterol sulfate HFA (VENTOLIN HFA) 108 (90 Base) MCG/ACT inhaler, Inhale 2 puffs into the lungs 2 times daily, Disp: 54 g, Rfl: 0    ondansetron (ZOFRAN ODT) 4 MG disintegrating tablet, Take 1 tablet by mouth every 8 hours as needed for Nausea or Vomiting, Disp: 30 tablet, Rfl: 2    nystatin (MYCOSTATIN) 932676 UNIT/GM cream, Apply topically 2 times daily. , Disp: 30 g, Rfl: 1    estradiol (ESTRACE) 1 MG tablet, take 1 tablet by mouth once daily, Disp: 28 tablet, Rfl: 1    cloNIDine (CATAPRES) 0.1 MG tablet, 0.1 mg, Disp: , Rfl:     furosemide (LASIX) 20 MG tablet, 20 mg, Disp: , Rfl:     valACYclovir (VALTREX) 500 MG tablet, Take 1 tablet by mouth daily, Disp: 30 tablet, Rfl: 2    estradiol (ESTRACE VAGINAL) 0.1 MG/GM vaginal cream, Place 1 g vaginally daily, Disp: 1 Tube, Rfl: 3    RA VITAMIN B-12 TR 1000 MCG TBCR, , Disp: , Rfl: 0    Pyridoxine HCl (VITAMIN B-6) 25 MG tablet, , Disp: , Rfl: 0    ranitidine (ZANTAC) 150 MG tablet, , Disp: , Rfl: 0    topiramate (TOPAMAX) 50 MG tablet, Take 1 tablet by mouth 2 times daily (Patient taking differently: Take 100 mg by mouth 2 times daily ), Disp: 60 tablet, Rfl: 3    VENTOLIN  (90 BASE) MCG/ACT inhaler, Inhale 2 puffs into the lungs every 4 hours as needed , Disp: , Rfl: 0    lamoTRIgine (LAMICTAL) 200 MG tablet, Take 1 tablet by mouth 2 times daily , Disp: , Rfl: 1    methotrexate (RHEUMATREX) 2.5 MG chemo tablet, Take 12.5 mg by mouth once a week Indications: (Pt takes 5 tabs = 12.5 mg every Tuesday) (Pt takes 5 tabs = 12.5 mg every Tuesday), Disp: , Rfl: 0    gabapentin (NEURONTIN) 800 MG tablet, Take 1 tablet by mouth three times daily. , Disp: , Rfl:    pantoprazole (PROTONIX) 20 MG tablet, Take 1 tablet by mouth daily. , Disp: , Rfl:     hydrochlorothiazide (HYDRODIURIL) 25 MG tablet, Take 25 mg by mouth daily. , Disp: , Rfl:     levothyroxine (SYNTHROID) 75 MCG tablet, Take 1 tablet by mouth daily. On empty stomach, Disp: 30 tablet, Rfl: 5    folic acid (FOLVITE) 1 MG tablet, Take 1 mg by mouth daily. , Disp: , Rfl:     ALLERGIES:   Allergies   Allergen Reactions    Latex     Aspirin     Cortisone     Dye [Iodides] Other (See Comments)     IV dye leaked into arm and caused local swelling in that area    Ibuprofen     Influenza Vaccines      On orencia, for RA    Penicillins        FAMILY HISTORY:       Problem Relation Age of Onset    High Blood Pressure Other     Heart Disease Other     Emphysema Other     COPD Other     Cancer Other         colon    Heart Disease Father     Hypertension Father     Cancer Father     Cancer Mother     Cancer Paternal Uncle     Cancer Maternal Grandmother     Cancer Maternal Grandfather          SOCIAL HISTORY:   Social History     Socioeconomic History    Marital status: Single     Spouse name: Not on file    Number of children: Not on file    Years of education: Not on file    Highest education level: Not on file   Occupational History    Not on file   Tobacco Use    Smoking status: Former Smoker    Smokeless tobacco: Never Used    Tobacco comment: x9yrs ago cigs. Vaping Use    Vaping Use: Never used   Substance and Sexual Activity    Alcohol use: No     Alcohol/week: 0.0 standard drinks     Comment: recovering alch.  since 2000    Drug use: No    Sexual activity: Not Currently     Partners: Male   Other Topics Concern    Not on file   Social History Narrative    Not on file     Social Determinants of Health     Financial Resource Strain: Low Risk     Difficulty of Paying Living Expenses: Not hard at all   Food Insecurity: No Food Insecurity    Worried About [address] in the Last Year: Never true    Ran Out of Food in the Last Year: Never true   Transportation Needs: No Transportation Needs    Lack of Transportation (Medical): No    Lack of Transportation (Non-Medical): No   Physical Activity:     Days of Exercise per Week: Not on file    Minutes of Exercise per Session: Not on file   Stress:     Feeling of Stress : Not on file   Social Connections:     Frequency of Communication with Friends and Family: Not on file    Frequency of Social Gatherings with Friends and Family: Not on file    Attends Taoism Services: Not on file    Active Member of Clubs or Organizations: Not on file    Attends Club or Organization Meetings: Not on file    Marital Status: Not on file   Intimate Partner Violence:     Fear of Current or Ex-Partner: Not on file    Emotionally Abused: Not on file    Physically Abused: Not on file    Sexually Abused: Not on file   Housing Stability:     Unable to Pay for Housing in the Last Year: Not on file    Number of [place] in the Last Year: Not on file    Unstable Housing in the Last Year: Not on file         REVIEW OF SYSTEMS:         Review of Systems   Constitutional: Positive for unexpected weight change. Negative for appetite change and fatigue. HENT: Positive for trouble swallowing. Negative for sore throat and voice change. Respiratory: Negative for cough, choking, shortness of breath and wheezing. Cardiovascular: Positive for chest pain. Negative for palpitations and leg swelling. Gastrointestinal: Positive for abdominal distention, abdominal pain, anal bleeding, blood in stool, constipation and diarrhea. Negative for nausea, rectal pain and vomiting. Neurological: Positive for headaches. Negative for dizziness, weakness, light-headedness and numbness. Hematological: Does not bruise/bleed easily. Psychiatric/Behavioral: Negative for confusion and sleep disturbance. The patient is not nervous/anxious.         PHYSICAL EXAMINATION: Vital signs reviewed per the nursing documentation. /68   Pulse 78   Temp 97 °F (36.1 °C)   Wt 120 lb (54.4 kg)   LMP 08/05/2007 (Within Years)   BMI 22.67 kg/m²   Body mass index is 22.67 kg/m². Physical Exam  Nursing note reviewed. Constitutional:       Appearance: She is well-developed. Comments: Anxious    HENT:      Head: Normocephalic and atraumatic. Eyes:      Conjunctiva/sclera: Conjunctivae normal.      Pupils: Pupils are equal, round, and reactive to light. Cardiovascular:      Heart sounds: Normal heart sounds. Pulmonary:      Effort: Pulmonary effort is normal.      Breath sounds: Normal breath sounds. Abdominal:      General: Bowel sounds are normal.      Palpations: Abdomen is soft. Comments: Mild TENDER, NON DISTENTED  LIVER SPLEEN AND HERNIAS ARE NOT  PALPABLE  BOWEL SOUNDS ARE POSITIVE      Musculoskeletal:         General: Normal range of motion. Cervical back: Normal range of motion and neck supple. Skin:     General: Skin is warm. Neurological:      Mental Status: She is alert and oriented to person, place, and time.    Psychiatric:         Behavior: Behavior normal.           LABORATORY DATA: Reviewed  Lab Results   Component Value Date    WBC 5.4 07/11/2021    HGB 10.3 (L) 07/11/2021    HCT 32.0 (L) 07/11/2021    .6 07/11/2021     07/11/2021     07/11/2021    K 3.8 07/11/2021     07/11/2021    CO2 24 07/11/2021    BUN 10 07/11/2021    CREATININE 0.42 (L) 07/11/2021    LABPROT 7.2 03/23/2012    LABALBU 3.5 01/16/2021    BILITOT 0.55 01/16/2021    ALKPHOS 73 01/16/2021    AST 15 01/16/2021    ALT 7 01/16/2021    INR 1.0 09/07/2017         Lab Results   Component Value Date    RBC 3.18 (L) 07/11/2021    HGB 10.3 (L) 07/11/2021    .6 07/11/2021    MCH 32.4 07/11/2021    MCHC 32.2 07/11/2021    RDW 13.3 07/11/2021    MPV 10.7 07/11/2021    BASOPCT 1 07/11/2021    LYMPHSABS 2.22 07/11/2021    MONOSABS 0.40 07/11/2021    NEUTROABS 2.70 07/11/2021    EOSABS 0.06 07/11/2021    BASOSABS 0.03 07/11/2021         DIAGNOSTIC TESTING:     No results found. Assessment  1. Gastroesophageal reflux disease, unspecified whether esophagitis present    2. Internal hemorrhoids    3. Tubulovillous adenoma    4. Other irritable bowel syndrome        Plan    Colonoscopy with 2-day prep    The Endoscopic procedure was explained to the patient in detail  The prep and NPO were explained  All the Risks, Benefits, and Alternatives were explained  Risk of Bleeding, Perforation and Cardio Respiratory risks were explained  her questions were answered  The procedure has been scheduled with the  in the office  Patient was asked to give us a call for any questions  The patient has verbalized understanding and agreement to this plan. Pt was given instructions and advice in detail about the symptom of constipation. She was explained about avoidance of fast food, soda pops, cheese and red meat. Was also told to avoid sedatives narcotics and pain killers if possible. Pt was advised to start drinking ample amount of water and liquid. Was told to adapt and follow an exercise regimen. Instructions were given to increase the amount of fiber including dietary in terms of bran, cereals, whole wheat, brown bread etc. Was also instructed to start using supplemental fiber either Metamucil, citrucell or bennafiber with ample liquids. She was told to start drinking prune juice which is good for constipation. If symptoms don't resolve she will require medicines to assist with her symptoms    Pt has verbalized understanding and agreement to this plan. Pt seems to have signs and symptoms consistent with GERD, acid indigestion and heartburns. She was discussed  in detail about some possible life style and dietary modifications.  She was stressed about the maintenance  of appropriate weight and effect of obesity contributing to reflux symptoms. Routine exercise was streesed. Avoidance of Caffeine, nicotine and chocolate were explained. Pt was asked to avoid spices grease and fried food. Advices were also given about avoidance of any kind of fast foods, soda pops and high energy drinks. Pt was advised to place two small block under the head end of the bed which may help with night time reflux. Was advised not to eat any thin at least 2-3 hrs before going to bed and walk especially after dinner    Pt has verbalized understanding and agreement to this plan. Pt was discussed in detail about the possible side effects of proton pump inhibiter therapy. She was explained about the possibility of calcium and magnesium malabsorption and was advised to start taking calcium supplements with Vit D. Some over the counter regimens were explained to patient. Some dietary advices were also given. She has verbalized understanding and agreement to this. The patient was instructed to start taking some OTC Probiotics products   These are available over the counter at the Pharmacy stores and Grocery stores  He was explained about the beneficial effects they have in the GI track  They will help to establish the good bacterial dick and will help with the digestion and bowel movements  The patient has verbalized understanding and agreement to this plan    More than half of patient's clinic visit time was spent in counseling about lifestyle and dietary modifications  Patient's  questions were answered in this regard as well  The patient has verbalized understanding and agreement         Thank you for allowing me to participate in the care of Ms. Bill. For any further questions please do not hesitate to contact me. I have reviewed and agree with the ROS entered by the MA/Nurse.          Moises Marie MD, [facility]  Board Certified in Gastroenterology and [address] [place] [address] Gastroenterology  Office #: [phone]

## 2022-03-24 RX ORDER — POLYETHYLENE GLYCOL 3350, SODIUM SULFATE ANHYDROUS, SODIUM BICARBONATE, SODIUM CHLORIDE, POTASSIUM CHLORIDE 236; 22.74; 6.74; 5.86; 2.97 G/4L; G/4L; G/4L; G/4L; G/4L
4 POWDER, FOR SOLUTION ORAL ONCE
Qty: 4000 ML | Refills: 0 | Status: SHIPPED | OUTPATIENT
Start: 2022-03-24 | End: 2022-03-24

## 2022-04-12 ENCOUNTER — HOSPITAL ENCOUNTER (OUTPATIENT)
Dept: PAIN MANAGEMENT | Age: 57
Discharge: HOME | End: 2022-04-12
Payer: COMMERCIAL

## 2022-04-12 VITALS
TEMPERATURE: 97.6 F | DIASTOLIC BLOOD PRESSURE: 68 MMHG | OXYGEN SATURATION: 97 % | WEIGHT: 115 LBS | BODY MASS INDEX: 21.71 KG/M2 | SYSTOLIC BLOOD PRESSURE: 111 MMHG | HEIGHT: 61 IN | HEART RATE: 69 BPM

## 2022-04-12 DIAGNOSIS — G89.29 CHRONIC GENERALIZED PAIN: Chronic | ICD-10-CM

## 2022-04-12 DIAGNOSIS — R52 CHRONIC GENERALIZED PAIN: Chronic | ICD-10-CM

## 2022-04-12 DIAGNOSIS — M06.9 RHEUMATOID ARTHRITIS INVOLVING MULTIPLE SITES, UNSPECIFIED WHETHER RHEUMATOID FACTOR PRESENT (HCC): Primary | ICD-10-CM

## 2022-04-12 PROCEDURE — 99213 OFFICE O/P EST LOW 20 MIN: CPT | Performed by: ANESTHESIOLOGY

## 2022-04-12 PROCEDURE — 99203 OFFICE O/P NEW LOW 30 MIN: CPT

## 2022-04-12 ASSESSMENT — ENCOUNTER SYMPTOMS
CONSTIPATION: 1
COUGH: 0
SHORTNESS OF BREATH: 0
NAUSEA: 0
VOMITING: 0
DIARRHEA: 1
BACK PAIN: 1
WHEEZING: 0

## 2022-04-12 NOTE — PROGRESS NOTES
The patient is a 64 y. o. Non- / non  female. Chief Complaint   Patient presents with    Neck Pain    New Patient        HPI    Requesting physician for the evaluation of Vidya Krueger 1965:     Patient is a 80-year-old female with PMH of rheumatoid arthritis and multiple other comorbidities. She presents to clinic for her neck pain, was seen about 2 years ago in the same clinic for the same complaint. Patient states she has not seen any other pain management physicians over the last 2 years and has not been taking any medication for her pain throughout this time. She states the pain continues to fluctuate, currently 7/10 and can go higher in intensity. She also complains of radiation of pain down bilateral arms and up to her hands. She describes the pain as sometimes sharp and sometimes dull, denies any exacerbating or relieving factors, states that the pain can happen anywhere and at any time regardless of what she is doing. She had surgery on her left hand few months ago for carpal tunnel syndrome. She continues to regularly follow with her rheumatologist and is also planning to see her neurosurgeon. She has a protrusion in her neck at the site of her prior surgery, plans to discuss this with her surgeon. Patient is currently on prednisone therapy for her RA, started by her rheumatologist.  Cervical steroid injections were discussed at previous pain management office visit, patient states her rheumatologist does not want her to get them. Pain History  Pain score today  7  1. Location: neck   2. Radiation: into arms to hands   3. Character: aching burning throbbing stabbing   5. Duration: years  6. Onset: years  7. Did an injury cause pain: no  8. Aggravating factors: twisting , lifting things   9. Alleviating factors: nothing   10.  Associated symptoms (numbness / tingling / weakness):  yes  -Where at: legs  -Down into finger tips or toes (specify which finger or toes): fingers   -constant or intermitting:  constant  11. Red Flags: (weight loss / chills / loss of bladder or bowel control): no    Previous management history  1. Previous diagnostic workup: (Imaging/EMG)   CT, MRI, or Xray:  Xray MRI CT   What part of the body: neck  What facility did they have it at:[facility]  What year or specific date:  Not sure  EMG:  no    2. Previous non interventional treatments tried:  chiropractor or physical therapy:  PT - Chiropractor   What part of the body: Neck  What facility was it done at:  [facility]  How long ago was it last tried:year  Did it work: No  Did they complete it:Yes    3. Previous Medications tried  NSAID's: no  Neurontin: yes  Lyrica: no  Trycyclic antidepressant (Ellavil / Pamelor ): no  Cymbalta: yes  Opioids (Ultram / Vicodin / Percocet / Morphine / Dilaudid / Oramorph/ Fentanyl etc.): Percocet   Last Pain medication taken (name of med and date):    4. Previous Interventional pain procedures tried:  What kind of injection:no  Who did the injection: no  did the injection help: na  Last time injection was done:N/A    5.  Previous surgeries for pain  What part of the body did they have the surgery: yes  What physician did the surgery: Dr Andrea Cheung did they have the surgery done: [facility]  Date of Surgery: 2021    Social History:  Marital status: Single   Employment History: no  Working  No  Full time Or Part time: na  Disability  Yes   Legal Issues related to pain complaint:  no        No results found for: LABA1C  No results found for: EAG      Informant: patient        Past Medical History:   Diagnosis Date    Anxiety     Anxiety     Asthma     [facility]     Cervical pain (neck)     Depression     Depression     Epilepsy ([facility] [facility]     History of blood transfusion     History of herpes genitalis 4/10/2007    see lab work scanned    Hypertension     Hypothyroidism     Insomnia     Internal hemorrhoids     Irritable bowel syndrome     Lupus [facility] [address]     Menarche @ 14 y/o    MVP (mitral valve prolapse)     Parity     G 4 P 3  -  3 c-sections,1 ectopic    Rheumatoid arthritis ([facility] [address] [facility]     Seizures ([facility] [address] 2016    Tubulovillous adenoma     Vitamin D deficiency         Past Surgical History:   Procedure Laterality Date    CARPAL TUNNEL RELEASE      both    CERVIX BIOPSY       SECTION       X 3    COLONOSCOPY  8/29/11    3/5/12    COLONOSCOPY  2022    attempted; poor prep    COLONOSCOPY N/A 2022    ATTEMPTED COLONOSCOPY POOR PREP performed by Dejah Parada MD at [address] [place] [address]  7/15/2010    D/T menorrhagia per Dr Walker Nearing  10/11/2010    laparoscopic supracervical hyst with ovarian  preservation bilaterally - per Dr Kg Reynolds  7/15/2010    with D&C per Dr Swartz Tompkins replacement Left/   [facility]    NECK SURGERY      disc removed  from neck  [facility]    NERVE SURGERY      both arms nerve repair    OTHER SURGICAL HISTORY      buttock proc. due to inf.  SIGMOIDOSCOPY  16    TUBULOVILLOUS ADENOMA; flexible; diminutive polypectomy, sm. int. hemorrhoids    UPPER GASTROINTESTINAL ENDOSCOPY  11    UPPER GASTROINTESTINAL ENDOSCOPY  2022    UPPER GASTROINTESTINAL ENDOSCOPY N/A 2022    EGD BIOPSY performed by [facility]jah [facility]ada MD at [facility] 3+VW         Social History     Socioeconomic History    Marital status: Single     Spouse name: Not on file    Number of children: Not on file    Years of education: Not on file    Highest education level: Not on file   Occupational History    Not on file   Tobacco Use    Smoking status: Former Smoker    Smokeless tobacco: Never Used    Tobacco comment: x9yrs ago cigs. Vaping Use    Vaping Use: Never used   Substance and Sexual Activity    Alcohol use: No     Alcohol/week: 0.0 standard drinks     Comment: recovering alch.  since     Drug and caused local swelling in that area    Ibuprofen     Influenza Vaccines      On orencia, for RA    Penicillins        Vitals:    04/12/22 1054   BP: 111/68   Pulse: 69   Temp: 97.6 °F (36.4 °C)   SpO2: 97%       Current Outpatient Medications   Medication Sig Dispense Refill    magnesium citrate solution Follow instructions provided by physician's office 296 mL 0    cetirizine (ZYRTEC) 10 MG tablet take 1 tablet by mouth once daily      ketotifen (ZADITOR) 0.025 % ophthalmic solution instill 1 drop into both eyes twice a day      Multiple Vitamins-Minerals (MULTI FOR HER 50+) CAPS take 1 capsule by mouth once daily with food      ondansetron (ZOFRAN) 4 MG tablet TAKE ONE TABLET BY MOUTH EVERY 8 HOURS AS NEEDED      tiZANidine (ZANAFLEX) 4 MG tablet       albuterol sulfate HFA (VENTOLIN HFA) 108 (90 Base) MCG/ACT inhaler Inhale 2 puffs into the lungs 2 times daily 54 g 0    ondansetron (ZOFRAN ODT) 4 MG disintegrating tablet Take 1 tablet by mouth every 8 hours as needed for Nausea or Vomiting 30 tablet 2    nystatin (MYCOSTATIN) 626483 UNIT/GM cream Apply topically 2 times daily.  30 g 1    estradiol (ESTRACE) 1 MG tablet take 1 tablet by mouth once daily 28 tablet 1    cloNIDine (CATAPRES) 0.1 MG tablet 0.1 mg      furosemide (LASIX) 20 MG tablet 20 mg      valACYclovir (VALTREX) 500 MG tablet Take 1 tablet by mouth daily 30 tablet 2    estradiol (ESTRACE VAGINAL) 0.1 MG/GM vaginal cream Place 1 g vaginally daily 1 Tube 3    RA VITAMIN B-12 TR 1000 MCG TBCR   0    Pyridoxine HCl (VITAMIN B-6) 25 MG tablet   0    ranitidine (ZANTAC) 150 MG tablet   0    topiramate (TOPAMAX) 50 MG tablet Take 1 tablet by mouth 2 times daily (Patient taking differently: Take 100 mg by mouth 2 times daily ) 60 tablet 3    VENTOLIN  (90 BASE) MCG/ACT inhaler Inhale 2 puffs into the lungs every 4 hours as needed   0    lamoTRIgine (LAMICTAL) 200 MG tablet Take 1 tablet by mouth 2 times daily   1    methotrexate (RHEUMATREX) 2.5 MG chemo tablet Take 12.5 mg by mouth once a week Indications: (Pt takes 5 tabs = 12.5 mg every Tuesday) (Pt takes 5 tabs = 12.5 mg every Tuesday)  0    gabapentin (NEURONTIN) 800 MG tablet Take 1 tablet by mouth three times daily.  pantoprazole (PROTONIX) 20 MG tablet Take 1 tablet by mouth daily.  hydrochlorothiazide (HYDRODIURIL) 25 MG tablet Take 25 mg by mouth daily.  levothyroxine (SYNTHROID) 75 MCG tablet Take 1 tablet by mouth daily. On empty stomach 30 tablet 5    folic acid (FOLVITE) 1 MG tablet Take 1 mg by mouth daily. No current facility-administered medications for this encounter. Review of Systems   Constitutional: Negative for chills, fatigue and fever. Respiratory: Negative for cough, shortness of breath and wheezing. Gastrointestinal: Positive for constipation and diarrhea. Negative for nausea and vomiting. Musculoskeletal: Positive for back pain, gait problem, neck pain and neck stiffness. Neurological: Positive for dizziness, weakness, numbness and headaches. Negative for tremors. Objective:  General Appearance:  Well-appearing, in no acute distress and comfortable. Vital signs: (most recent): Blood pressure 111/68, pulse 69, temperature 97.6 °F (36.4 °C), height 5' 1\" (1.549 m), weight 115 lb (52.2 kg), last menstrual period 08/05/2007, SpO2 97 %, not currently breastfeeding. Vital signs are normal.  No fever. Output: Producing urine and producing stool. HEENT: Normal HEENT exam.    Lungs:  Normal effort and normal respiratory rate. She is not in respiratory distress. Heart: Normal rate. Extremities: Normal range of motion. There is no deformity. Neurological: Patient is alert and oriented to person, place and time. Patient has normal coordination. Pupils:  Pupils are equal, round, and reactive to light. Pupils are equal.   Skin:  Warm and dry. No rash or cyanosis.      Assessment & Plan This is a 15-year-old woman who was evaluated by me 2 years back  She is seen today again for history of chronic generalized all over the body pain  She is diagnosed with rheumatoid arthritis and is followed with rheumatology clinic care at [facility]  She had numerous surgeries  She history of cervical spine surgery and continue to have neck pain  She was seeing pain management at [facility] and was discharged for failure urine toxicology, tested positive for unprescribed opioid  She has been to several other pain practices also  On previous evaluation I explained to her that I will not be able to take over and prescribe opioids for her  She is not interested in any interventional procedure  She is on chronic prednisone therapy and will be high risk for any infection    Advised patient to continue follow-up with rheumatology  Follow-up as needed basis  1. Rheumatoid arthritis involving multiple sites, unspecified whether rheumatoid factor present ([facility] [address]    2. Chronic generalized pain        No orders of the defined types were placed in this encounter. No orders of the defined types were placed in this encounter.            Electronically signed by Pascale Mesa MD on 4/12/2022 at 4:37 PM

## 2022-05-10 NOTE — TELEPHONE ENCOUNTER
Patient called [facility]. Shayla Zazueta was on the other line.  Would like a call back to r/s

## 2022-05-12 NOTE — TELEPHONE ENCOUNTER
Pt called the office to reschedule her procedure. Pt now scheduledA HENRI Larsen Emre colon repeat Thurs 7/14/22 @ 1015am 2 day golytely/mag-cm- vacc. Reviewed bowel prep instructions with patient over phone and mailed to home address.  Pt reports she already has her bowel prep meds

## 2022-06-02 ENCOUNTER — HOSPITAL ENCOUNTER (OUTPATIENT)
Age: 57
Setting detail: SPECIMEN
Discharge: HOME | End: 2022-06-02

## 2022-06-02 ENCOUNTER — OFFICE VISIT (OUTPATIENT)
Dept: OBGYN CLINIC | Age: 57
End: 2022-06-02
Payer: COMMERCIAL

## 2022-06-02 VITALS
HEIGHT: 61 IN | WEIGHT: 133 LBS | HEART RATE: 70 BPM | SYSTOLIC BLOOD PRESSURE: 118 MMHG | BODY MASS INDEX: 25.11 KG/M2 | DIASTOLIC BLOOD PRESSURE: 78 MMHG

## 2022-06-02 DIAGNOSIS — Z01.419 PAP SMEAR, AS PART OF ROUTINE GYNECOLOGICAL EXAMINATION: Primary | ICD-10-CM

## 2022-06-02 DIAGNOSIS — Z12.31 ENCOUNTER FOR SCREENING MAMMOGRAM FOR BREAST CANCER: ICD-10-CM

## 2022-06-02 DIAGNOSIS — Z90.711 HISTORY OF HYSTERECTOMY, SUPRACERVICAL: ICD-10-CM

## 2022-06-02 DIAGNOSIS — Z11.51 SCREENING FOR HUMAN PAPILLOMAVIRUS: ICD-10-CM

## 2022-06-02 PROCEDURE — G0101 CA SCREEN;PELVIC/BREAST EXAM: HCPCS | Performed by: SPECIALIST

## 2022-06-02 ASSESSMENT — ENCOUNTER SYMPTOMS
ABDOMINAL DISTENTION: 0
COUGH: 0
VOMITING: 0
ABDOMINAL PAIN: 0
APNEA: 0
CONSTIPATION: 0
DIARRHEA: 1
NAUSEA: 0
EYE PAIN: 0

## 2022-06-02 NOTE — PROGRESS NOTES
Subjective:      Patient ID: Pierre Rodriguez is a 64 y.o. female. Chief Complaint   Patient presents with    Annual Exam     /78 (Site: Right Upper Arm, Position: Sitting, Cuff Size: Medium Adult)   Pulse 70   Ht 5' 1\" (1.549 m)   Wt 133 lb (60.3 kg)   LMP 08/05/2007 (Within Years)   Breastfeeding No   BMI 25.13 kg/m²   Patient's last menstrual period was 08/05/2007 (within years). [id number]    Past Medical History:   Diagnosis Date    Anxiety     Anxiety     Asthma     Cerebral artery occlusion with cerebral infarction ([facility] [address]     Cervical pain (neck)     Depression     Depression     Epilepsy ([facility] [address]     History of blood transfusion     History of herpes genitalis 4/10/2007    see lab work scanned    Hypertension     Hypothyroidism     Insomnia     Internal hemorrhoids     Irritable bowel syndrome     Lupus ([facility] [address]     Menarche @ 12 y/o    MVP (mitral valve prolapse)     Parity     G 4 P 3  -  3 c-sections,1 ectopic    Rheumatoid arthritis ([facility] [address]     Seizures ([facility] [address] 9/20/2016    Tubulovillous adenoma     Vitamin D deficiency      Current Outpatient Medications Ordered in Epic   Medication Sig Dispense Refill    magnesium citrate solution Follow instructions provided by physician's office 296 mL 0    cetirizine (ZYRTEC) 10 MG tablet take 1 tablet by mouth once daily      Multiple Vitamins-Minerals (MULTI FOR HER 50+) CAPS take 1 capsule by mouth once daily with food      tiZANidine (ZANAFLEX) 4 MG tablet       albuterol sulfate HFA (VENTOLIN HFA) 108 (90 Base) MCG/ACT inhaler Inhale 2 puffs into the lungs 2 times daily 54 g 0    ondansetron (ZOFRAN ODT) 4 MG disintegrating tablet Take 1 tablet by mouth every 8 hours as needed for Nausea or Vomiting 30 tablet 2    nystatin (MYCOSTATIN) 668317 UNIT/GM cream Apply topically 2 times daily.  30 g 1    estradiol (ESTRACE) 1 MG tablet take 1 tablet by mouth once daily 28 tablet 1    furosemide (LASIX) 20 MG tablet 20 mg      valACYclovir (VALTREX) 500 MG tablet Take 1 tablet by mouth daily 30 tablet 2    RA VITAMIN B-12 TR 1000 MCG TBCR   0    Pyridoxine HCl (VITAMIN B-6) 25 MG tablet   0    ranitidine (ZANTAC) 150 MG tablet   0    topiramate (TOPAMAX) 50 MG tablet Take 1 tablet by mouth 2 times daily (Patient taking differently: Take 100 mg by mouth 2 times daily ) 60 tablet 3    VENTOLIN  (90 BASE) MCG/ACT inhaler Inhale 2 puffs into the lungs every 4 hours as needed   0    lamoTRIgine (LAMICTAL) 200 MG tablet Take 1 tablet by mouth 2 times daily   1    methotrexate (RHEUMATREX) 2.5 MG chemo tablet Take 12.5 mg by mouth once a week Indications: (Pt takes 5 tabs = 12.5 mg every Tuesday) (Pt takes 5 tabs = 12.5 mg every Tuesday)  0    gabapentin (NEURONTIN) 800 MG tablet Take 1 tablet by mouth three times daily.  pantoprazole (PROTONIX) 20 MG tablet Take 1 tablet by mouth daily.  hydrochlorothiazide (HYDRODIURIL) 25 MG tablet Take 25 mg by mouth daily.  levothyroxine (SYNTHROID) 75 MCG tablet Take 1 tablet by mouth daily. On empty stomach 30 tablet 5    folic acid (FOLVITE) 1 MG tablet Take 1 mg by mouth daily. No current Epic-ordered facility-administered medications on file.      Problem List Items Addressed This Visit     Pap smear, as part of routine gynecological examination - Primary    Relevant Orders    KISHOR DIGITAL SCREEN W OR WO CAD BILATERAL    PAP SMEAR    History of hysterectomy, supracervical    Screening for human papillomavirus    Relevant Orders    PAP SMEAR        Allergies   Allergen Reactions    Latex     Aspirin     Cortisone     Dye [Iodides] Other (See Comments)     IV dye leaked into arm and caused local swelling in that area    Ibuprofen     Influenza Vaccines      On orencia, for RA    Penicillins      Orders Placed This Encounter   Procedures    KISHOR DIGITAL SCREEN W OR WO CAD BILATERAL     Standing Status:   Future     Standing Expiration Date:   8/2/2023     Order Question:   Collection Type     Answer: Thin Prep     Order Specific Question:   Prior Abnormal Pap Test     Answer:   No     Order Specific Question:   Screening or Diagnostic     Answer:   Screening     Order Specific Question:   HPV Requested? Answer:   Yes     Order Specific Question:   High Risk Patient     Answer:   N/A        HPI  Patient is here for an annual exam.  She had a supracervical hysterectomy in 2010. Her last pap smear was done 10/9/2019 and was abnormal with ASCUS and negative for HPV. Her last mammogram was on 1/7/2016 and was normal.  Patient is seeing Dr. Altaf Adames for IBS. She states that she had to prep for a colonoscopy last month and is still having frequent diarrhea. Review of Systems   Constitutional: Negative for activity change, appetite change and fever. HENT: Negative for ear discharge and ear pain. Eyes: Negative for pain and visual disturbance. Respiratory: Negative for apnea and cough. Cardiovascular: Negative for chest pain, palpitations and leg swelling. Gastrointestinal: Positive for diarrhea. Negative for abdominal distention, abdominal pain, constipation, nausea and vomiting. Endocrine: Negative. Genitourinary: Negative for difficulty urinating, dysuria, menstrual problem and pelvic pain. Musculoskeletal: Negative for neck pain and neck stiffness. Skin: Negative. Neurological: Negative for light-headedness and numbness. Hematological: Negative. Does not bruise/bleed easily. Objective:   Physical Exam  Vitals and nursing note reviewed. Exam conducted with a chaperone present. Constitutional:       Appearance: She is well-developed. HENT:      Head: Normocephalic and atraumatic. Neck:      Thyroid: No thyroid mass or thyromegaly. Cardiovascular:      Rate and Rhythm: Normal rate and regular rhythm. Pulmonary:      Effort: Pulmonary effort is normal.      Breath sounds: Normal breath sounds.    Chest:   Breasts:      Right: Normal. No inverted nipple, mass, nipple discharge, skin change or tenderness. Left: Normal. No inverted nipple, mass, nipple discharge, skin change or tenderness. Abdominal:      General: Bowel sounds are normal. There is no distension. Palpations: Abdomen is soft. There is no mass. Tenderness: There is no abdominal tenderness. There is no guarding or rebound. Hernia: There is no hernia in the left inguinal area. Genitourinary:     General: Normal vulva. Exam position: Supine. Labia:         Right: No rash or lesion. Left: No rash or lesion. Vagina: Normal. No signs of injury. No vaginal discharge or tenderness. Cervix: No cervical motion tenderness or discharge. Uterus: Absent. Adnexa: Right adnexa normal and left adnexa normal.        Right: No mass or tenderness. Left: No mass or tenderness. Rectum: Normal. No mass or anal fissure. Normal anal tone. Comments: Cervix is flush to the vagina   Musculoskeletal:         General: No tenderness. Normal range of motion. Skin:     General: Skin is warm and dry. Neurological:      Mental Status: She is alert and oriented to person, place, and time. Psychiatric:         Judgment: Judgment normal.         Assessment:      Patient post supracervical hysterectomy with normal annual exam.  Pap smear was done. Patient was advised to have mammogram done. She was also encouraged to keep appointment as scheduled with Dr. Nerissa Cheung. Plan:      Orders Placed This Encounter   Procedures    KISHOR DIGITAL SCREEN W OR WO CAD BILATERAL    PAP SMEAR     Appointment in 1 year    Mary SAUNDERS am scribing for, and in the presence of Dr. Raiza Delgado. Electronically signed by: Mary Schwab 6/2/22 2:15 PM       I agree to the above documentation placed by my scribe Mary Schwab. I reviewed the scribe's note and agree with the documented findings and plan of care.  Any areas of disagreement are noted on the chart. I have personally evaluated this patient. Additional findings are as noted. I agree with the chief complaint, past medical history, past surgical history, allergies, medications, social and family history as documented unless otherwise noted below.      Electronically signed by David Vivas MD on 6/2/2022 at 2:50 PM

## 2022-06-09 LAB — CYTOLOGY REPORT: NORMAL

## 2022-06-15 ENCOUNTER — HOSPITAL ENCOUNTER (OUTPATIENT)
Dept: WOMENS IMAGING | Age: 57
Discharge: HOME | End: 2022-06-17
Payer: COMMERCIAL

## 2022-06-15 DIAGNOSIS — Z12.31 ENCOUNTER FOR SCREENING MAMMOGRAM FOR BREAST CANCER: ICD-10-CM

## 2022-06-15 DIAGNOSIS — Z01.419 PAP SMEAR, AS PART OF ROUTINE GYNECOLOGICAL EXAMINATION: ICD-10-CM

## 2022-06-15 PROCEDURE — 77063 BREAST TOMOSYNTHESIS BI: CPT

## 2022-07-02 PROBLEM — Z01.419 PAP SMEAR, AS PART OF ROUTINE GYNECOLOGICAL EXAMINATION: Status: RESOLVED | Noted: 2022-06-02 | Resolved: 2022-07-02

## 2022-07-14 ENCOUNTER — TELEPHONE (OUTPATIENT)
Dept: GASTROENTEROLOGY | Age: 57
End: 2022-07-14

## 2022-07-14 RX ORDER — POLYETHYLENE GLYCOL 3350, SODIUM SULFATE ANHYDROUS, SODIUM BICARBONATE, SODIUM CHLORIDE, POTASSIUM CHLORIDE 236; 22.74; 6.74; 5.86; 2.97 G/4L; G/4L; G/4L; G/4L; G/4L
4 POWDER, FOR SOLUTION ORAL ONCE
Qty: 4000 ML | Refills: 0 | Status: SHIPPED | OUTPATIENT
Start: 2022-07-14 | End: 2022-07-14

## 2022-07-14 NOTE — TELEPHONE ENCOUNTER
Writer called pt to discuss rescheduling. Pt advised she will not be able to reschedule again if she does not complete this procedure. She will need office visit next time. Pt stating her ride did not show up and she already took the prep. Pt now scheduled HARMAN Muniz colon repeat Thurs 10/6/22 @ 1015am 2 day golytely/mag-cm. Reviewed bowel prep instructions with patient over phone and mailed to home address.

## 2022-07-14 NOTE — TELEPHONE ENCOUNTER
Pt called this morning to cx her colon because her ride did not come, pt would like a call back to r/s colon, writer cx'd procedure with STA.

## 2022-07-26 ENCOUNTER — OFFICE VISIT (OUTPATIENT)
Dept: OBGYN CLINIC | Age: 57
End: 2022-07-26
Payer: COMMERCIAL

## 2022-07-26 ENCOUNTER — HOSPITAL ENCOUNTER (OUTPATIENT)
Age: 57
Setting detail: SPECIMEN
Discharge: HOME | End: 2022-07-26

## 2022-07-26 VITALS
WEIGHT: 132 LBS | SYSTOLIC BLOOD PRESSURE: 110 MMHG | DIASTOLIC BLOOD PRESSURE: 82 MMHG | BODY MASS INDEX: 24.92 KG/M2 | HEART RATE: 82 BPM | HEIGHT: 61 IN

## 2022-07-26 DIAGNOSIS — R39.9 URINARY TRACT INFECTION SYMPTOMS: ICD-10-CM

## 2022-07-26 DIAGNOSIS — N76.0 ACUTE VAGINITIS: Primary | ICD-10-CM

## 2022-07-26 DIAGNOSIS — N76.0 ACUTE VAGINITIS: ICD-10-CM

## 2022-07-26 PROCEDURE — G8420 CALC BMI NORM PARAMETERS: HCPCS | Performed by: SPECIALIST

## 2022-07-26 PROCEDURE — 3017F COLORECTAL CA SCREEN DOC REV: CPT | Performed by: SPECIALIST

## 2022-07-26 PROCEDURE — G8427 DOCREV CUR MEDS BY ELIG CLIN: HCPCS | Performed by: SPECIALIST

## 2022-07-26 PROCEDURE — 1036F TOBACCO NON-USER: CPT | Performed by: SPECIALIST

## 2022-07-26 PROCEDURE — 99213 OFFICE O/P EST LOW 20 MIN: CPT | Performed by: SPECIALIST

## 2022-07-26 RX ORDER — METRONIDAZOLE 500 MG/1
500 TABLET ORAL 2 TIMES DAILY
Qty: 14 TABLET | Refills: 0 | Status: SHIPPED | OUTPATIENT
Start: 2022-07-26 | End: 2022-08-02

## 2022-07-26 RX ORDER — FLUCONAZOLE 100 MG/1
100 TABLET ORAL DAILY
Qty: 7 TABLET | Refills: 0 | Status: SHIPPED | OUTPATIENT
Start: 2022-07-26 | End: 2022-08-02

## 2022-07-26 ASSESSMENT — ENCOUNTER SYMPTOMS
ABDOMINAL PAIN: 0
ABDOMINAL DISTENTION: 0
VOMITING: 0
CONSTIPATION: 0
NAUSEA: 0
EYE PAIN: 0
DIARRHEA: 0
APNEA: 0
COUGH: 0

## 2022-07-26 NOTE — PROGRESS NOTES
Subjective:      Patient ID: River Ray is a 64 y.o. female. Chief Complaint   Patient presents with    Vaginitis     /82 (Site: Left Upper Arm, Position: Sitting, Cuff Size: Medium Adult)   Pulse 82   Ht 5' 1\" (1.549 m)   Wt 132 lb (59.9 kg)   LMP 08/05/2007 (Within Years)   BMI 24.94 kg/m²   Patient's last menstrual period was 08/05/2007 (within years). [id number]    Past Medical History:   Diagnosis Date    Anxiety     Anxiety     Asthma     Cerebral artery occlusion with cerebral infarction [facility])     Cervical pain (neck)     Depression     Depression     Epilepsy ([facility] [address]     History of blood transfusion     History of herpes genitalis 4/10/2007    see lab work scanned    Hypertension     Hypothyroidism     Insomnia     Internal hemorrhoids     Irritable bowel syndrome     Lupus ([facility] [address]     Menarche @ 14 y/o    MVP (mitral valve prolapse)     Parity     G 4 P 3  -  3 c-sections,1 ectopic    Rheumatoid arthritis ([facility] [address]     Seizures ([facility] [address] 9/20/2016    Tubulovillous adenoma     Vitamin D deficiency      Current Outpatient Medications Ordered in Epic   Medication Sig Dispense Refill    metroNIDAZOLE (FLAGYL) 500 MG tablet Take 1 tablet by mouth in the morning and 1 tablet in the evening. Do all this for 7 days. 14 tablet 0    fluconazole (DIFLUCAN) 100 MG tablet Take 1 tablet by mouth in the morning for 7 days.  7 tablet 0    magnesium citrate solution Follow instructions provided by physician's office 296 mL 0    magnesium citrate solution Follow instructions provided by physician's office 296 mL 0    cetirizine (ZYRTEC) 10 MG tablet take 1 tablet by mouth once daily      Multiple Vitamins-Minerals (MULTI FOR HER 50+) CAPS take 1 capsule by mouth once daily with food      tiZANidine (ZANAFLEX) 4 MG tablet       albuterol sulfate HFA (VENTOLIN HFA) 108 (90 Base) MCG/ACT inhaler Inhale 2 puffs into the lungs 2 times daily 54 g 0    ondansetron (ZOFRAN ODT) 4 MG disintegrating tablet Take 1 tablet by mouth every 8 hours as needed for Nausea or Vomiting 30 tablet 2    nystatin (MYCOSTATIN) 514374 UNIT/GM cream Apply topically 2 times daily. 30 g 1    estradiol (ESTRACE) 1 MG tablet take 1 tablet by mouth once daily 28 tablet 1    furosemide (LASIX) 20 MG tablet 20 mg      valACYclovir (VALTREX) 500 MG tablet Take 1 tablet by mouth daily 30 tablet 2    RA VITAMIN B-12 TR 1000 MCG TBCR   0    Pyridoxine HCl (VITAMIN B-6) 25 MG tablet   0    ranitidine (ZANTAC) 150 MG tablet   0    topiramate (TOPAMAX) 50 MG tablet Take 1 tablet by mouth 2 times daily (Patient taking differently: Take 100 mg by mouth in the morning and 100 mg before bedtime.) 60 tablet 3    VENTOLIN  (90 BASE) MCG/ACT inhaler Inhale 2 puffs into the lungs every 4 hours as needed   0    lamoTRIgine (LAMICTAL) 200 MG tablet Take 1 tablet by mouth 2 times daily   1    methotrexate (RHEUMATREX) 2.5 MG chemo tablet Take 12.5 mg by mouth once a week Indications: (Pt takes 5 tabs = 12.5 mg every Tuesday) (Pt takes 5 tabs = 12.5 mg every Tuesday)  0    gabapentin (NEURONTIN) 800 MG tablet Take 1 tablet by mouth three times daily. pantoprazole (PROTONIX) 20 MG tablet Take 1 tablet by mouth daily. hydrochlorothiazide (HYDRODIURIL) 25 MG tablet Take 25 mg by mouth daily. levothyroxine (SYNTHROID) 75 MCG tablet Take 1 tablet by mouth daily. On empty stomach 30 tablet 5    folic acid (FOLVITE) 1 MG tablet Take 1 mg by mouth daily. No current Epic-ordered facility-administered medications on file.      Problem List Items Addressed This Visit       Vaginitis - Primary    Relevant Orders    Vaginitis DNA Probe    C.trachomatis N.gonorrhoeae DNA     Other Visit Diagnoses       Urinary tract infection symptoms        Relevant Orders    Urinalysis with Reflex to Culture          Allergies   Allergen Reactions    Latex     Aspirin     Cortisone     Dye [Iodides] Other (See Comments)     IV dye leaked into arm and caused local swelling in that area    Ibuprofen     Influenza Vaccines      On orencia, for RA    Penicillins      Orders Placed This Encounter   Procedures    Vaginitis DNA Probe     Standing Status:   Future     Standing Expiration Date:   7/26/2023    C.trachomatis N.gonorrhoeae DNA     Standing Status:   Future     Standing Expiration Date:   7/26/2023    Urinalysis with Reflex to Culture     Standing Status:   Future     Standing Expiration Date:   7/26/2023     Order Specific Question:   SPECIFY(EX-CATH,MIDSTREAM,CYSTO,ETC)? Answer:   urinary odor            HPI  Patient is here today because she has a vaginal discharge with odor for the last week. Review of Systems   Constitutional:  Negative for activity change, appetite change and fever. HENT:  Negative for ear discharge and ear pain. Eyes:  Negative for pain and visual disturbance. Respiratory:  Negative for apnea and cough. Cardiovascular:  Negative for chest pain, palpitations and leg swelling. Gastrointestinal:  Negative for abdominal distention, abdominal pain, constipation, diarrhea, nausea and vomiting. Endocrine: Negative. Genitourinary:  Positive for vaginal discharge (with odor). Negative for difficulty urinating, dysuria, menstrual problem and pelvic pain. Musculoskeletal:  Negative for neck pain and neck stiffness. Skin: Negative. Neurological:  Negative for light-headedness and numbness. Hematological: Negative. Does not bruise/bleed easily. Objective:   Physical Exam  Vitals and nursing note reviewed. Exam conducted with a chaperone present. Constitutional:       Appearance: She is well-developed. Genitourinary:     Vagina: Vaginal discharge present. Comments: Patient exhibits significant pain with speculum exam  Skin:     General: Skin is dry. Neurological:      Mental Status: She is alert and oriented to person, place, and time. Psychiatric:         Behavior: Behavior normal.         Thought Content:  Thought content normal.       Assessment:      Patient with vaginitis. Cultures were done. Will treat with Flagyl and Diflucan. Patient was told if a different medication is needed the office will contact her. Plan:      Orders Placed This Encounter   Procedures    Vaginitis DNA Probe    C.trachomatis N.gonorrhoeae DNA    Urinalysis with Reflex to Culture       Orders Placed This Encounter   Medications    metroNIDAZOLE (FLAGYL) 500 MG tablet     Sig: Take 1 tablet by mouth in the morning and 1 tablet in the evening. Do all this for 7 days. Dispense:  14 tablet     Refill:  0    fluconazole (DIFLUCAN) 100 MG tablet     Sig: Take 1 tablet by mouth in the morning for 7 days. Dispense:  7 tablet     Refill:  0        Appointment as needed or for annual exam    IMadeline, am scribing for, and in the presence of Dr. Ca Tran. Electronically signed by: Madeline Perez 7/26/22 4:53 PM       I agree to the above documentation placed by my scribe Madeline Singhr. I reviewed the scribe's note and agree with the documented findings and plan of care. Any areas of disagreement are noted on the chart. I have personally evaluated this patient. Additional findings are as noted. I agree with the chief complaint, past medical history, past surgical history, allergies, medications, social and family history as documented unless otherwise noted below.      Electronically signed by Ca Tran MD on 7/28/2022 at 1:46 PM

## 2022-07-27 LAB
-: ABNORMAL
AMORPHOUS: ABNORMAL
BILIRUBIN URINE: NEGATIVE
C TRACH DNA GENITAL QL NAA+PROBE: NEGATIVE
CANDIDA SPECIES, DNA PROBE: NEGATIVE
CASTS UA: ABNORMAL /LPF (ref 0–2)
CASTS UA: ABNORMAL /LPF (ref 0–2)
COLOR: YELLOW
EPITHELIAL CELLS UA: ABNORMAL /HPF (ref 0–5)
GARDNERELLA VAGINALIS, DNA PROBE: NEGATIVE
GLUCOSE URINE: NEGATIVE
KETONES, URINE: NEGATIVE
LEUKOCYTE ESTERASE, URINE: ABNORMAL
MUCUS: ABNORMAL
N. GONORRHOEAE DNA: NEGATIVE
NITRITE, URINE: NEGATIVE
PH UA: 8 (ref 5–8)
PROTEIN UA: ABNORMAL
RBC UA: ABNORMAL /HPF (ref 0–2)
SOURCE: NORMAL
SPECIFIC GRAVITY UA: 1.02 (ref 1–1.03)
SPECIMEN DESCRIPTION: NORMAL
TRICHOMONAS VAGINALIS DNA: NEGATIVE
TURBIDITY: ABNORMAL
URINE HGB: ABNORMAL
UROBILINOGEN, URINE: NORMAL
WBC UA: ABNORMAL /HPF (ref 0–5)

## 2022-07-28 PROBLEM — R39.9 URINARY TRACT INFECTION SYMPTOMS: Status: ACTIVE | Noted: 2022-07-28

## 2022-10-04 NOTE — TELEPHONE ENCOUNTER
Patient LVM for a call back about the time of her procedure and when to start prep. Returned call and LVM that her procedure is on 10/6/22 845 am @ [facility] for arrival.  Prep is to start today.

## 2022-10-05 NOTE — TELEPHONE ENCOUNTER
Pt is calling to r/s her procedure tomorrow with Namrata Avila, it looks like it is cx'd with [facility] already, please call pt, thanks.

## 2022-11-17 ENCOUNTER — TELEPHONE (OUTPATIENT)
Dept: GASTROENTEROLOGY | Age: 57
End: 2022-11-17

## 2022-11-17 NOTE — TELEPHONE ENCOUNTER
Patient called office asking if she could get something called in for diarrhea. She is advised that because she cancelled her last few appts, he can not treat her until he sees her in office. She should attempt to call PCP and see if there is something she can do until her appt with Dr Madhuri Lawson.

## 2023-02-15 DIAGNOSIS — A60.00 GENITAL HERPES SIMPLEX, UNSPECIFIED SITE: ICD-10-CM

## 2023-02-16 RX ORDER — VALACYCLOVIR HYDROCHLORIDE 500 MG/1
TABLET, FILM COATED ORAL
Qty: 30 TABLET | Refills: 2 | Status: SHIPPED | OUTPATIENT
Start: 2023-02-16

## 2023-03-01 ENCOUNTER — OFFICE VISIT (OUTPATIENT)
Dept: GASTROENTEROLOGY | Age: 58
End: 2023-03-01
Payer: COMMERCIAL

## 2023-03-01 VITALS
DIASTOLIC BLOOD PRESSURE: 71 MMHG | TEMPERATURE: 97.7 F | SYSTOLIC BLOOD PRESSURE: 115 MMHG | WEIGHT: 120 LBS | BODY MASS INDEX: 22.67 KG/M2

## 2023-03-01 DIAGNOSIS — D36.9 TUBULOVILLOUS ADENOMA: Primary | ICD-10-CM

## 2023-03-01 DIAGNOSIS — K62.5 RECTAL BLEEDING: ICD-10-CM

## 2023-03-01 DIAGNOSIS — K64.8 INTERNAL HEMORRHOIDS: ICD-10-CM

## 2023-03-01 DIAGNOSIS — K21.9 GASTROESOPHAGEAL REFLUX DISEASE, UNSPECIFIED WHETHER ESOPHAGITIS PRESENT: ICD-10-CM

## 2023-03-01 DIAGNOSIS — R63.4 WEIGHT LOSS: ICD-10-CM

## 2023-03-01 PROCEDURE — 1036F TOBACCO NON-USER: CPT | Performed by: INTERNAL MEDICINE

## 2023-03-01 PROCEDURE — G8427 DOCREV CUR MEDS BY ELIG CLIN: HCPCS | Performed by: INTERNAL MEDICINE

## 2023-03-01 PROCEDURE — 99214 OFFICE O/P EST MOD 30 MIN: CPT | Performed by: INTERNAL MEDICINE

## 2023-03-01 PROCEDURE — G8484 FLU IMMUNIZE NO ADMIN: HCPCS | Performed by: INTERNAL MEDICINE

## 2023-03-01 PROCEDURE — G8420 CALC BMI NORM PARAMETERS: HCPCS | Performed by: INTERNAL MEDICINE

## 2023-03-01 PROCEDURE — 3017F COLORECTAL CA SCREEN DOC REV: CPT | Performed by: INTERNAL MEDICINE

## 2023-03-01 RX ORDER — GREEN TEA/HOODIA GORDONII 315-12.5MG
1 CAPSULE ORAL 2 TIMES DAILY
Qty: 60 TABLET | Refills: 0 | Status: SHIPPED | OUTPATIENT
Start: 2023-03-01 | End: 2023-03-31

## 2023-03-01 ASSESSMENT — ENCOUNTER SYMPTOMS
VOICE CHANGE: 0
RECTAL PAIN: 0
SORE THROAT: 0
CHOKING: 0
NAUSEA: 1
SHORTNESS OF BREATH: 0
ANAL BLEEDING: 1
BLOOD IN STOOL: 0
CONSTIPATION: 1
DIARRHEA: 1
COUGH: 0
WHEEZING: 0
ABDOMINAL DISTENTION: 0
ABDOMINAL PAIN: 1
TROUBLE SWALLOWING: 0
VOMITING: 1

## 2023-03-01 NOTE — PROGRESS NOTES
GI CLINIC FOLLOW UP    NTERVAL HISTORY:   No referring provider defined for this encounter. Chief Complaint   Patient presents with    Gastroesophageal Reflux     Pt is here today for a f/u last seen on 3/23/22 for GERD, Internal hemorrhoids & IBS. 1. Tubulovillous adenoma    2. Internal hemorrhoids    3. Gastroesophageal reflux disease, unspecified whether esophagitis present    4. Weight loss    5. Rectal bleeding      This patient is seen in my office after a while  She  has hx of chronic constipation related to irritable bowel syndrome-like symptoms history for GERD  She had attempted colonoscopy multiple times was terminated secondary to poor prep history for tubulovillous adenoma unfortunately missed a lot of appointments for colonoscopy  She is here for evaluation and states that she did not have a ride and now that she has arranged someone she would like to get the colonoscopy done  Patient has been complaining of some abdominal pains, off and on cramping  Also complains of abdominal bloating and gas  Has off and on nausea without any sig vomiting  Has some alternating constipation and diarrhea  Has no weight loss  Has some anxiety issues    Some nausea but also has migraine issues    Previous records were reviewed with her    HISTORY OF PRESENT ILLNESS: Ms.Cheryl Red Cardona is a 62 y.o. female with a past history remarkable for , referred for evaluation of   Chief Complaint   Patient presents with    Gastroesophageal Reflux     Pt is here today for a f/u last seen on 3/23/22 for GERD, Internal hemorrhoids & IBS. Richar Roberson Past Medical,Family, and Social History reviewed and does contribute to the patient presenting condition. Patient's PMH/PSH,SH,PSYCH Hx, MEDs, ALLERGIES, and ROS were all reviewed and updated in the appropriate sections.     PAST MEDICAL HISTORY:  Past Medical History:   Diagnosis Date    Anxiety     Anxiety     Asthma     Cerebral artery occlusion with cerebral infarction [facility])     Cervical pain (neck)     Depression     Depression     Epilepsy ([facility] [address]     History of blood transfusion     History of herpes genitalis 4/10/2007    see lab work scanned    Hypertension     Hypothyroidism     Insomnia     Internal hemorrhoids     Irritable bowel syndrome     Lupus ([facility] [address]     Menarche @ 14 y/o    MVP (mitral valve prolapse)     Parity     G 4 P 3  -  3 c-sections,1 ectopic    Rheumatoid arthritis ([facility] [address]     Seizures ([facility] [address] 2016    Tubulovillous adenoma     Vitamin D deficiency        Past Surgical History:   Procedure Laterality Date    CARPAL TUNNEL RELEASE      both    CERVIX BIOPSY       SECTION       X 3    COLONOSCOPY  8/29/11    3/5/12    COLONOSCOPY  2022    attempted; poor prep    COLONOSCOPY N/A 2022    ATTEMPTED COLONOSCOPY POOR PREP performed by Parminder Forte MD at [facility]  7/15/2010    D/T menorrhagia per Dr Jp Schwab ([address] [place] [address])  10/11/2010    laparoscopic supracervical hyst with ovarian  preservation bilaterally - per Dr August Viramontes  7/15/2010    with D&C per Dr Da Silva Carlos replacement Left/   [facility]    NECK SURGERY      disc removed  from neck  [facility]    NERVE SURGERY      both arms nerve repair    OTHER SURGICAL HISTORY      buttock proc. due to inf.     SIGMOIDOSCOPY  16    TUBULOVILLOUS ADENOMA; flexible; diminutive polypectomy, sm. int. hemorrhoids    UPPER GASTROINTESTINAL ENDOSCOPY  11    UPPER GASTROINTESTINAL ENDOSCOPY  2022    UPPER GASTROINTESTINAL ENDOSCOPY N/A 2022    EGD BIOPSY performed by Parminder Forte MD at [facility] 3+VW         CURRENT MEDICATIONS:    Current Outpatient Medications:     Probiotic Acidophilus (FLORANEX) TABS, Take 1 tablet by mouth 2 times daily, Disp: 60 tablet, Rfl: 0    valACYclovir (VALTREX) 500 MG tablet, take 1 tablet by mouth twice a day, Disp: 30 tablet, Rfl: 2    magnesium citrate solution, Follow instructions provided by physician's office, Disp: 296 mL, Rfl: 0    magnesium citrate solution, Follow instructions provided by physician's office, Disp: 296 mL, Rfl: 0    cetirizine (ZYRTEC) 10 MG tablet, take 1 tablet by mouth once daily, Disp: , Rfl:     Multiple Vitamins-Minerals (MULTI FOR HER 50+) CAPS, take 1 capsule by mouth once daily with food, Disp: , Rfl:     tiZANidine (ZANAFLEX) 4 MG tablet, , Disp: , Rfl:     albuterol sulfate HFA (VENTOLIN HFA) 108 (90 Base) MCG/ACT inhaler, Inhale 2 puffs into the lungs 2 times daily, Disp: 54 g, Rfl: 0    ondansetron (ZOFRAN ODT) 4 MG disintegrating tablet, Take 1 tablet by mouth every 8 hours as needed for Nausea or Vomiting, Disp: 30 tablet, Rfl: 2    nystatin (MYCOSTATIN) 703315 UNIT/GM cream, Apply topically 2 times daily. , Disp: 30 g, Rfl: 1    estradiol (ESTRACE) 1 MG tablet, take 1 tablet by mouth once daily, Disp: 28 tablet, Rfl: 1    furosemide (LASIX) 20 MG tablet, 20 mg, Disp: , Rfl:     RA VITAMIN B-12 TR 1000 MCG TBCR, , Disp: , Rfl: 0    Pyridoxine HCl (VITAMIN B-6) 25 MG tablet, , Disp: , Rfl: 0    ranitidine (ZANTAC) 150 MG tablet, , Disp: , Rfl: 0    topiramate (TOPAMAX) 50 MG tablet, Take 1 tablet by mouth 2 times daily (Patient taking differently: Take 100 mg by mouth 2 times daily), Disp: 60 tablet, Rfl: 3    VENTOLIN  (90 BASE) MCG/ACT inhaler, Inhale 2 puffs into the lungs every 4 hours as needed , Disp: , Rfl: 0    lamoTRIgine (LAMICTAL) 200 MG tablet, Take 1 tablet by mouth 2 times daily , Disp: , Rfl: 1    methotrexate (RHEUMATREX) 2.5 MG chemo tablet, Take 12.5 mg by mouth once a week Indications: (Pt takes 5 tabs = 12.5 mg every Tuesday) (Pt takes 5 tabs = 12.5 mg every Tuesday), Disp: , Rfl: 0    gabapentin (NEURONTIN) 800 MG tablet, Take 1 tablet by mouth three times daily. , Disp: , Rfl:     pantoprazole (PROTONIX) 20 MG tablet, Take 1 tablet by mouth daily. , Disp: , Rfl:     hydrochlorothiazide (HYDRODIURIL) 25 MG tablet, Take 25 mg by mouth daily. , Disp: , Rfl:     levothyroxine (SYNTHROID) 75 MCG tablet, Take 1 tablet by mouth daily. On empty stomach, Disp: 30 tablet, Rfl: 5    folic acid (FOLVITE) 1 MG tablet, Take 1 mg by mouth daily. , Disp: , Rfl:     ALLERGIES:   Allergies   Allergen Reactions    Latex     Aspirin     Cortisone     Dye [Iodides] Other (See Comments)     IV dye leaked into arm and caused local swelling in that area    Ibuprofen     Influenza Vaccines      On orencia, for RA    Penicillins        FAMILY HISTORY:       Problem Relation Age of Onset    High Blood Pressure Other     Heart Disease Other     Emphysema Other     COPD Other     Cancer Other         colon    Heart Disease Father     Hypertension Father     Cancer Father     Cancer Mother     Cancer Paternal Uncle     Cancer Maternal Grandmother     Cancer Maternal Grandfather          SOCIAL HISTORY:   Social History     Socioeconomic History    Marital status: Single     Spouse name: Not on file    Number of children: Not on file    Years of education: Not on file    Highest education level: Not on file   Occupational History    Not on file   Tobacco Use    Smoking status: Former    Smokeless tobacco: Never    Tobacco comments:     x9yrs ago cigs. Vaping Use    Vaping Use: Never used   Substance and Sexual Activity    Alcohol use: No     Alcohol/week: 0.0 standard drinks     Comment: recovering alch.  since 2000    Drug use: No    Sexual activity: Not Currently     Partners: Male   Other Topics Concern    Not on file   Social History Narrative    Not on file     Social Determinants of Health     Financial Resource Strain: Not on file   Food Insecurity: Not on file   Transportation Needs: Not on file   Physical Activity: Not on file   Stress: Not on file   Social Connections: Not on file   Intimate Partner Violence: Not on file   Housing Stability: Not on file         REVIEW OF SYSTEMS:         Review of Systems Constitutional:  Positive for appetite change and unexpected weight change. Negative for fatigue. HENT:  Negative for sore throat, trouble swallowing and voice change. Respiratory:  Negative for cough, choking, shortness of breath and wheezing. Cardiovascular:  Negative for chest pain, palpitations and leg swelling. Gastrointestinal:  Positive for abdominal pain, anal bleeding, constipation, diarrhea, nausea and vomiting. Negative for abdominal distention, blood in stool and rectal pain. Neurological:  Positive for light-headedness. Negative for dizziness, weakness, numbness and headaches. Hematological:  Does not bruise/bleed easily. Psychiatric/Behavioral:  Negative for confusion and sleep disturbance. The patient is not nervous/anxious. PHYSICAL EXAMINATION: Vital signs reviewed per the nursing documentation. /71   Temp 97.7 °F (36.5 °C)   Wt 120 lb (54.4 kg)   LMP 08/05/2007 (Within Years)   BMI 22.67 kg/m²   Body mass index is 22.67 kg/m². Physical Exam  Nursing note reviewed. Constitutional:       Appearance: She is well-developed. Comments: Anxious    HENT:      Head: Normocephalic and atraumatic. Eyes:      Conjunctiva/sclera: Conjunctivae normal.      Pupils: Pupils are equal, round, and reactive to light. Cardiovascular:      Heart sounds: Normal heart sounds. Pulmonary:      Effort: Pulmonary effort is normal.      Breath sounds: Rales present. Abdominal:      General: Bowel sounds are normal.      Palpations: Abdomen is soft. Comments: +  TENDER, NON DISTENTED  LIVER SPLEEN AND HERNIAS ARE NOT  PALPABLE  BOWEL SOUNDS ARE POSITIVE      Musculoskeletal:         General: Normal range of motion. Cervical back: Normal range of motion and neck supple. Skin:     General: Skin is warm. Neurological:      Mental Status: She is alert and oriented to person, place, and time.    Psychiatric:         Behavior: Behavior normal.         LABORATORY DATA: Reviewed  Lab Results   Component Value Date    WBC 5.4 07/11/2021    HGB 10.3 (L) 07/11/2021    HCT 32.0 (L) 07/11/2021    .6 07/11/2021     07/11/2021     07/11/2021    K 3.8 07/11/2021     07/11/2021    CO2 24 07/11/2021    BUN 10 07/11/2021    CREATININE 0.42 (L) 07/11/2021    LABPROT 7.2 03/23/2012    LABALBU 3.5 01/16/2021    BILITOT 0.55 01/16/2021    ALKPHOS 73 01/16/2021    AST 15 01/16/2021    ALT 7 01/16/2021    INR 1.0 09/07/2017         Lab Results   Component Value Date    RBC 3.18 (L) 07/11/2021    HGB 10.3 (L) 07/11/2021    .6 07/11/2021    MCH 32.4 07/11/2021    MCHC 32.2 07/11/2021    RDW 13.3 07/11/2021    MPV 10.7 07/11/2021    BASOPCT 1 07/11/2021    LYMPHSABS 2.22 07/11/2021    MONOSABS 0.40 07/11/2021    NEUTROABS 2.70 07/11/2021    EOSABS 0.06 07/11/2021    BASOSABS 0.03 07/11/2021         DIAGNOSTIC TESTING:     No results found. Assessment  1. Tubulovillous adenoma    2. Internal hemorrhoids    3. Gastroesophageal reflux disease, unspecified whether esophagitis present    4. Weight loss    5. Rectal bleeding        Plan    Plan  Colonoscopy   Two  day prep    The Endoscopic procedure was explained to the patient in detail  The prep and NPO were explained  All the Risks, Benefits, and Alternatives were explained  Risk of Bleeding, Perforation and Cardio Respiratory risks were explained  her questions were answered  The procedure has been scheduled with the  in the office  Patient was asked to give us a call for any questions  The patient has verbalized understanding and agreement to this plan. Pt was advised in detail about some life style and dietary modifications. She was advised about avoidance of caffeine, nicotine and chocolate. Pt was also told to stay away from any kind of fast foods, soda pops.  She was also advised to avoid lots of spices, grease and fried food etc.     Instructions were also given about trying to arrange the timing, quality and quantity of food. Instructions were given about using ample amount of fiber including dietary and supplemental fiber either metamucil, bennafiber or citrucell etc.  Pt was advised about drinking ample amount of water without any colors or chemicals. Stress was given about regular exercise. Pt has verbalized understanding and agreement to these modifications. Pt seems to have signs and symptoms consistent with GERD, acid indigestion and heartburns. She was discussed  in detail about some possible life style and dietary modifications. She was stressed about the maintenance  of appropriate weight and effect of obesity contributing to reflux symptoms. Routine exercise was streesed. Avoidance of Caffeine, nicotine and chocolate were explained. Pt was asked to avoid spices grease and fried food. Advices were also given about avoidance of any kind of fast foods, soda pops and high energy drinks. Pt was advised to place two small block under the head end of the bed which may help with night time reflux. Was advised not to eat any thin at least 2-3 hrs before going to bed and walk especially after dinner    Pt has verbalized understanding and agreement to this plan. The patient was instructed to start taking some OTC Probiotics products   These are available over the counter at the Pharmacy stores and Grocery stores  He was explained about the beneficial effects they have in the GI track  They will help to establish the good bacterial dick and will help with the digestion and bowel movements  The patient has verbalized understanding and agreement to this plan    Thank you for allowing me to participate in the care of Ms. Bill. For any further questions please do not hesitate to contact me. I have reviewed and agree with the ROS entered by the MA/Nurse.          Lenny Carias MD, [facility]  Board Certified in Gastroenterology and [address] [place] [address] Gastroenterology  Office #: [phone]

## 2023-03-02 RX ORDER — POLYETHYLENE GLYCOL 3350, SODIUM SULFATE ANHYDROUS, SODIUM BICARBONATE, SODIUM CHLORIDE, POTASSIUM CHLORIDE 236; 22.74; 6.74; 5.86; 2.97 G/4L; G/4L; G/4L; G/4L; G/4L
POWDER, FOR SOLUTION ORAL
Qty: 4000 ML | Refills: 0 | Status: SHIPPED | OUTPATIENT
Start: 2023-03-02

## 2023-03-08 ENCOUNTER — TELEPHONE (OUTPATIENT)
Dept: GASTROENTEROLOGY | Age: 58
End: 2023-03-08

## 2023-03-08 NOTE — TELEPHONE ENCOUNTER
Patient showed up at [facility] office asking for a work note for 1 1/2 weeks for rectal bleeding.  Pt is informed Dr Muniz does not write people off work for that long.  He usually only does the day of the appt. She states someone told her yesterday that the note would be ready for her to  in the office today.  Writer checked chart.  No TE entered.  Writer called and spoke with Dr Muniz's MA.  She states pt should not be taken off work.  As far as she knows, Dr Muniz never approved that.  She should just have note from day of appt. After reviewing chart further, letter is seen taking pt off work from 2/28-3/9/23.  MA agrees to give letter to pt, as she was told it would be ready.  She will speak to  about his expectations regarding time off work to share with office staff.

## 2023-04-13 ENCOUNTER — APPOINTMENT (OUTPATIENT)
Dept: CT IMAGING | Age: 58
End: 2023-04-13
Payer: COMMERCIAL

## 2023-04-13 ENCOUNTER — APPOINTMENT (OUTPATIENT)
Dept: GENERAL RADIOLOGY | Age: 58
End: 2023-04-13
Payer: COMMERCIAL

## 2023-04-13 ENCOUNTER — HOSPITAL ENCOUNTER (EMERGENCY)
Age: 58
Discharge: HOME | End: 2023-04-13
Attending: EMERGENCY MEDICINE
Payer: COMMERCIAL

## 2023-04-13 VITALS
HEART RATE: 54 BPM | WEIGHT: 105 LBS | BODY MASS INDEX: 19.84 KG/M2 | TEMPERATURE: 97.8 F | RESPIRATION RATE: 18 BRPM | DIASTOLIC BLOOD PRESSURE: 75 MMHG | SYSTOLIC BLOOD PRESSURE: 118 MMHG | OXYGEN SATURATION: 97 %

## 2023-04-13 DIAGNOSIS — R10.9 ABDOMINAL PAIN, UNSPECIFIED ABDOMINAL LOCATION: Primary | ICD-10-CM

## 2023-04-13 LAB
ABSOLUTE EOS #: 0 K/UL (ref 0–0.4)
ABSOLUTE LYMPH #: 1.4 K/UL (ref 1–4.8)
ABSOLUTE MONO #: 0.3 K/UL (ref 0.1–1.3)
ALBUMIN SERPL-MCNC: 3.8 G/DL (ref 3.5–5.2)
ALP SERPL-CCNC: 49 U/L (ref 35–104)
ALT SERPL-CCNC: 10 U/L (ref 5–33)
ANION GAP SERPL CALCULATED.3IONS-SCNC: 11 MMOL/L (ref 9–17)
AST SERPL-CCNC: 16 U/L
BACTERIA: NORMAL
BASOPHILS # BLD: 1 % (ref 0–2)
BASOPHILS ABSOLUTE: 0 K/UL (ref 0–0.2)
BILIRUB SERPL-MCNC: 0.6 MG/DL (ref 0.3–1.2)
BILIRUBIN URINE: NEGATIVE
BNP SERPL-MCNC: 146 PG/ML
BUN SERPL-MCNC: 19 MG/DL (ref 6–20)
CALCIUM SERPL-MCNC: 9 MG/DL (ref 8.6–10.4)
CASTS UA: NORMAL /LPF
CHLORIDE SERPL-SCNC: 108 MMOL/L (ref 98–107)
CO2 SERPL-SCNC: 22 MMOL/L (ref 20–31)
COLOR: YELLOW
CREAT SERPL-MCNC: 0.67 MG/DL (ref 0.5–0.9)
D DIMER BLD IA.RAPID-MCNC: 1.03 MG/L FEU (ref 0–0.59)
EOSINOPHILS RELATIVE PERCENT: 0 % (ref 0–4)
EPITHELIAL CELLS UA: NORMAL /HPF
FLUAV RNA RESP QL NAA+PROBE: NOT DETECTED
FLUBV RNA RESP QL NAA+PROBE: NOT DETECTED
GFR SERPL CREATININE-BSD FRML MDRD: >60 ML/MIN/1.73M2
GLUCOSE SERPL-MCNC: 96 MG/DL (ref 70–99)
GLUCOSE UR STRIP.AUTO-MCNC: NEGATIVE MG/DL
HCT VFR BLD AUTO: 42.1 % (ref 36–46)
HGB BLD-MCNC: 14 G/DL (ref 12–16)
KETONES UR STRIP.AUTO-MCNC: NEGATIVE MG/DL
LEUKOCYTE ESTERASE UR QL STRIP.AUTO: ABNORMAL
LIPASE SERPL-CCNC: 73 U/L (ref 13–60)
LYMPHOCYTES # BLD: 36 % (ref 24–44)
MAGNESIUM SERPL-MCNC: 1.9 MG/DL (ref 1.6–2.6)
MCH RBC QN AUTO: 32.3 PG (ref 26–34)
MCHC RBC AUTO-ENTMCNC: 33.3 G/DL (ref 31–37)
MCV RBC AUTO: 97.1 FL (ref 80–100)
MONOCYTES # BLD: 9 % (ref 1–7)
NITRITE UR QL STRIP.AUTO: NEGATIVE
PDW BLD-RTO: 13.2 % (ref 11.5–14.9)
PLATELET # BLD AUTO: 195 K/UL (ref 150–450)
PMV BLD AUTO: 8 FL (ref 6–12)
POTASSIUM SERPL-SCNC: 3.4 MMOL/L (ref 3.7–5.3)
PROT SERPL-MCNC: 6.8 G/DL (ref 6.4–8.3)
PROT UR STRIP.AUTO-MCNC: 7 MG/DL (ref 5–8)
PROT UR STRIP.AUTO-MCNC: ABNORMAL MG/DL
RBC # BLD: 4.33 M/UL (ref 4–5.2)
RBC CLUMPS #/AREA URNS AUTO: NORMAL /HPF
SARS-COV-2 RNA RESP QL NAA+PROBE: NOT DETECTED
SEG NEUTROPHILS: 54 % (ref 36–66)
SEGMENTED NEUTROPHILS ABSOLUTE COUNT: 2.1 K/UL (ref 1.3–9.1)
SODIUM SERPL-SCNC: 141 MMOL/L (ref 135–144)
SOURCE: NORMAL
SPECIFIC GRAVITY UA: 1.02 (ref 1–1.03)
SPECIMEN DESCRIPTION: NORMAL
TROPONIN I SERPL DL<=0.01 NG/ML-MCNC: 10 NG/L (ref 0–14)
TROPONIN I SERPL DL<=0.01 NG/ML-MCNC: 9 NG/L (ref 0–14)
TURBIDITY: CLEAR
URINE HGB: NEGATIVE
UROBILINOGEN, URINE: NORMAL
WBC # BLD AUTO: 4 K/UL (ref 3.5–11)
WBC UA: NORMAL /HPF

## 2023-04-13 PROCEDURE — 93005 ELECTROCARDIOGRAM TRACING: CPT | Performed by: STUDENT IN AN ORGANIZED HEALTH CARE EDUCATION/TRAINING PROGRAM

## 2023-04-13 PROCEDURE — 71260 CT THORAX DX C+: CPT | Performed by: STUDENT IN AN ORGANIZED HEALTH CARE EDUCATION/TRAINING PROGRAM

## 2023-04-13 PROCEDURE — 83690 ASSAY OF LIPASE: CPT

## 2023-04-13 PROCEDURE — 2580000003 HC RX 258: Performed by: STUDENT IN AN ORGANIZED HEALTH CARE EDUCATION/TRAINING PROGRAM

## 2023-04-13 PROCEDURE — 87636 SARSCOV2 & INF A&B AMP PRB: CPT

## 2023-04-13 PROCEDURE — 83880 ASSAY OF NATRIURETIC PEPTIDE: CPT

## 2023-04-13 PROCEDURE — 6360000002 HC RX W HCPCS: Performed by: STUDENT IN AN ORGANIZED HEALTH CARE EDUCATION/TRAINING PROGRAM

## 2023-04-13 PROCEDURE — 6360000004 HC RX CONTRAST MEDICATION: Performed by: STUDENT IN AN ORGANIZED HEALTH CARE EDUCATION/TRAINING PROGRAM

## 2023-04-13 PROCEDURE — 85379 FIBRIN DEGRADATION QUANT: CPT

## 2023-04-13 PROCEDURE — 36415 COLL VENOUS BLD VENIPUNCTURE: CPT

## 2023-04-13 PROCEDURE — 85025 COMPLETE CBC W/AUTO DIFF WBC: CPT

## 2023-04-13 PROCEDURE — 99285 EMERGENCY DEPT VISIT HI MDM: CPT

## 2023-04-13 PROCEDURE — 96374 THER/PROPH/DIAG INJ IV PUSH: CPT

## 2023-04-13 PROCEDURE — 84484 ASSAY OF TROPONIN QUANT: CPT

## 2023-04-13 PROCEDURE — 81001 URINALYSIS AUTO W/SCOPE: CPT

## 2023-04-13 PROCEDURE — 83735 ASSAY OF MAGNESIUM: CPT

## 2023-04-13 PROCEDURE — 74177 CT ABD & PELVIS W/CONTRAST: CPT

## 2023-04-13 PROCEDURE — 80053 COMPREHEN METABOLIC PANEL: CPT

## 2023-04-13 PROCEDURE — 71046 X-RAY EXAM CHEST 2 VIEWS: CPT

## 2023-04-13 PROCEDURE — 96361 HYDRATE IV INFUSION ADD-ON: CPT

## 2023-04-13 RX ORDER — MORPHINE SULFATE 4 MG/ML
4 INJECTION, SOLUTION INTRAMUSCULAR; INTRAVENOUS ONCE
Status: COMPLETED | OUTPATIENT
Start: 2023-04-13 | End: 2023-04-13

## 2023-04-13 RX ORDER — 0.9 % SODIUM CHLORIDE 0.9 %
1000 INTRAVENOUS SOLUTION INTRAVENOUS ONCE
Status: COMPLETED | OUTPATIENT
Start: 2023-04-13 | End: 2023-04-13

## 2023-04-13 RX ORDER — DICYCLOMINE HYDROCHLORIDE 10 MG/1
10 CAPSULE ORAL 4 TIMES DAILY
Qty: 20 CAPSULE | Refills: 0 | Status: SHIPPED | OUTPATIENT
Start: 2023-04-13 | End: 2023-04-18

## 2023-04-13 RX ORDER — 0.9 % SODIUM CHLORIDE 0.9 %
100 INTRAVENOUS SOLUTION INTRAVENOUS ONCE
Status: COMPLETED | OUTPATIENT
Start: 2023-04-13 | End: 2023-04-13

## 2023-04-13 RX ORDER — SODIUM CHLORIDE 0.9 % (FLUSH) 0.9 %
10 SYRINGE (ML) INJECTION PRN
Status: DISCONTINUED | OUTPATIENT
Start: 2023-04-13 | End: 2023-04-13 | Stop reason: HOSPADM

## 2023-04-13 RX ADMIN — SODIUM CHLORIDE 100 ML: 9 INJECTION, SOLUTION INTRAVENOUS at 18:25

## 2023-04-13 RX ADMIN — SODIUM CHLORIDE, PRESERVATIVE FREE 10 ML: 5 INJECTION INTRAVENOUS at 18:25

## 2023-04-13 RX ADMIN — MORPHINE SULFATE 4 MG: 4 INJECTION, SOLUTION INTRAMUSCULAR; INTRAVENOUS at 17:13

## 2023-04-13 RX ADMIN — SODIUM CHLORIDE 1000 ML: 9 INJECTION, SOLUTION INTRAVENOUS at 17:03

## 2023-04-13 RX ADMIN — IOPAMIDOL 100 ML: 755 INJECTION, SOLUTION INTRAVENOUS at 18:24

## 2023-04-13 ASSESSMENT — ENCOUNTER SYMPTOMS
SORE THROAT: 0
NAUSEA: 1
ABDOMINAL PAIN: 1
CONSTIPATION: 0
CHEST TIGHTNESS: 0
VOMITING: 1
TROUBLE SWALLOWING: 0
DIARRHEA: 1
SHORTNESS OF BREATH: 1
COUGH: 0
ABDOMINAL DISTENTION: 0
BACK PAIN: 0

## 2023-04-13 ASSESSMENT — PAIN SCALES - GENERAL
PAINLEVEL_OUTOF10: 9

## 2023-04-13 ASSESSMENT — PAIN DESCRIPTION - LOCATION: LOCATION: BACK;ABDOMEN

## 2023-04-13 ASSESSMENT — PAIN DESCRIPTION - ORIENTATION: ORIENTATION: LOWER

## 2023-04-13 NOTE — ED NOTES
Report given to JEN Vargas from ED. Report method in person   The following was reviewed with receiving RN:   Current vital signs:  /74   Pulse 53   Temp 97.8 °F (36.6 °C) (Temporal)   Resp 16   Wt 105 lb (47.6 kg)   LMP 08/05/2007 (Within Years)   SpO2 96%   BMI 19.84 kg/m²                      Any medication or safety alerts were reviewed. Any pending diagnostics and notifications were also reviewed, as well as any safety concerns or issues, abnormal labs, abnormal imaging, and abnormal assessment findings. Questions were answered.           Tray Richter RN  04/13/23 2650

## 2023-04-13 NOTE — ED PROVIDER NOTES
Physician who either signs or Co-signs this chart in the absence of a cardiologist.    EMERGENCY DEPARTMENT COURSE:      ED Course as of 04/14/23 0058   Thu Apr 13, 2023   1811 CXR unremarkable  [SS]   1927 1. No evidence of pulmonary embolism or acute pulmonary abnormality. CT Abdomen/Pelvis:     1. Small air-fluid level present in the colon; correlate for diarrhea. 2. Trace right-sided pelvic free fluid present of unclear etiology or  clinical significance. [SS]   1927 Unremarkable, likely IBS flare. Will discharge. [SS]      ED Course User Index  [SS] Jaimee Wheeler MD       PROCEDURES:  None     CONSULTS:  None    CRITICAL CARE:  There was significant risk of life threatening deterioration of patient's condition requiring my direct management. Please MDM for Critical care time. FINAL IMPRESSION      1.  Abdominal pain, unspecified abdominal location          DISPOSITION / PLAN     DISPOSITION Decision To Discharge 04/13/2023 07:28:27 PM      PATIENT REFERRED TO:  Hao Walton, APRN - NP  [address]  [phone]          [facility] [address] [facility] ED  [facility] [address] [phone]          DISCHARGE MEDICATIONS:  Discharge Medication List as of 4/13/2023  7:29 PM        START taking these medications    Details   dicyclomine (BENTYL) 10 MG capsule Take 1 capsule by mouth 4 times daily for 5 days, Disp-20 capsule, R-0Normal             Jaimee Wheeler MD  Emergency Medicine Resident    (Please note that portions of thisnote were completed with a voice recognition program.  Efforts were made to edit the dictations but occasionally words are mis-transcribed.)        Jaimee Wheeler MD  Resident  04/14/23           EMERGENCY [address]     Pt Name: Sam Solis  MRN: [id number]  [place] 1965  Date of evaluation: 4/15/23       Sam Solis is a 62 y.o. female who presents with Abdominal Pain and Generalized Body

## 2023-04-14 LAB
EKG ATRIAL RATE: 65 BPM
EKG P AXIS: 49 DEGREES
EKG P-R INTERVAL: 136 MS
EKG Q-T INTERVAL: 384 MS
EKG QRS DURATION: 80 MS
EKG QTC CALCULATION (BAZETT): 399 MS
EKG R AXIS: -32 DEGREES
EKG T AXIS: 37 DEGREES
EKG VENTRICULAR RATE: 65 BPM

## 2023-04-14 PROCEDURE — 93010 ELECTROCARDIOGRAM REPORT: CPT | Performed by: INTERNAL MEDICINE

## 2023-05-30 ENCOUNTER — HOSPITAL ENCOUNTER (OUTPATIENT)
Dept: MRI IMAGING | Age: 58
Discharge: HOME | End: 2023-06-01
Payer: COMMERCIAL

## 2023-05-30 DIAGNOSIS — R11.2 NAUSEA AND VOMITING, UNSPECIFIED VOMITING TYPE: ICD-10-CM

## 2023-05-30 PROCEDURE — 76377 3D RENDER W/INTRP POSTPROCES: CPT

## 2023-06-06 ENCOUNTER — ANESTHESIA (OUTPATIENT)
Dept: OPERATING ROOM | Age: 58
End: 2023-06-06
Payer: COMMERCIAL

## 2023-06-06 ENCOUNTER — ANESTHESIA EVENT (OUTPATIENT)
Dept: OPERATING ROOM | Age: 58
End: 2023-06-06
Payer: COMMERCIAL

## 2023-06-06 ENCOUNTER — HOSPITAL ENCOUNTER (OUTPATIENT)
Age: 58
Setting detail: OUTPATIENT SURGERY
Discharge: HOME | End: 2023-06-06
Attending: INTERNAL MEDICINE | Admitting: INTERNAL MEDICINE
Payer: COMMERCIAL

## 2023-06-06 VITALS
WEIGHT: 105 LBS | HEART RATE: 82 BPM | DIASTOLIC BLOOD PRESSURE: 70 MMHG | HEIGHT: 61 IN | TEMPERATURE: 97.2 F | SYSTOLIC BLOOD PRESSURE: 119 MMHG | BODY MASS INDEX: 19.83 KG/M2 | OXYGEN SATURATION: 100 % | RESPIRATION RATE: 15 BRPM

## 2023-06-06 DIAGNOSIS — D36.9 TUBULOVILLOUS ADENOMA: ICD-10-CM

## 2023-06-06 DIAGNOSIS — R63.4 WEIGHT LOSS: ICD-10-CM

## 2023-06-06 DIAGNOSIS — K64.8 INTERNAL HEMORRHOIDS: ICD-10-CM

## 2023-06-06 DIAGNOSIS — K62.5 RECTAL BLEEDING: ICD-10-CM

## 2023-06-06 PROCEDURE — 2580000003 HC RX 258: Performed by: STUDENT IN AN ORGANIZED HEALTH CARE EDUCATION/TRAINING PROGRAM

## 2023-06-06 PROCEDURE — 2500000003 HC RX 250 WO HCPCS: Performed by: NURSE ANESTHETIST, CERTIFIED REGISTERED

## 2023-06-06 PROCEDURE — 2580000003 HC RX 258: Performed by: NURSE ANESTHETIST, CERTIFIED REGISTERED

## 2023-06-06 PROCEDURE — 2709999900 HC NON-CHARGEABLE SUPPLY: Performed by: INTERNAL MEDICINE

## 2023-06-06 PROCEDURE — 6360000002 HC RX W HCPCS: Performed by: NURSE ANESTHETIST, CERTIFIED REGISTERED

## 2023-06-06 PROCEDURE — 88305 TISSUE EXAM BY PATHOLOGIST: CPT

## 2023-06-06 PROCEDURE — 7100000010 HC PHASE II RECOVERY - FIRST 15 MIN: Performed by: INTERNAL MEDICINE

## 2023-06-06 PROCEDURE — 3700000001 HC ADD 15 MINUTES (ANESTHESIA): Performed by: INTERNAL MEDICINE

## 2023-06-06 PROCEDURE — 7100000011 HC PHASE II RECOVERY - ADDTL 15 MIN: Performed by: INTERNAL MEDICINE

## 2023-06-06 PROCEDURE — 3700000000 HC ANESTHESIA ATTENDED CARE: Performed by: INTERNAL MEDICINE

## 2023-06-06 PROCEDURE — 3609010300 HC COLONOSCOPY W/BIOPSY SINGLE/MULTIPLE: Performed by: INTERNAL MEDICINE

## 2023-06-06 RX ORDER — SODIUM CHLORIDE 9 MG/ML
INJECTION, SOLUTION INTRAVENOUS PRN
Status: CANCELLED | OUTPATIENT
Start: 2023-06-06

## 2023-06-06 RX ORDER — SODIUM CHLORIDE 0.9 % (FLUSH) 0.9 %
5-40 SYRINGE (ML) INJECTION EVERY 12 HOURS SCHEDULED
Status: CANCELLED | OUTPATIENT
Start: 2023-06-06

## 2023-06-06 RX ORDER — LIDOCAINE HYDROCHLORIDE 10 MG/ML
INJECTION, SOLUTION EPIDURAL; INFILTRATION; INTRACAUDAL; PERINEURAL PRN
Status: DISCONTINUED | OUTPATIENT
Start: 2023-06-06 | End: 2023-06-06 | Stop reason: SDUPTHER

## 2023-06-06 RX ORDER — AMITRIPTYLINE HYDROCHLORIDE 25 MG/1
25 TABLET, FILM COATED ORAL DAILY
COMMUNITY
Start: 2023-05-30

## 2023-06-06 RX ORDER — PROPOFOL 10 MG/ML
INJECTION, EMULSION INTRAVENOUS PRN
Status: DISCONTINUED | OUTPATIENT
Start: 2023-06-06 | End: 2023-06-06 | Stop reason: SDUPTHER

## 2023-06-06 RX ORDER — SODIUM CHLORIDE, SODIUM LACTATE, POTASSIUM CHLORIDE, CALCIUM CHLORIDE 600; 310; 30; 20 MG/100ML; MG/100ML; MG/100ML; MG/100ML
INJECTION, SOLUTION INTRAVENOUS CONTINUOUS
Status: DISCONTINUED | OUTPATIENT
Start: 2023-06-06 | End: 2023-06-06 | Stop reason: HOSPADM

## 2023-06-06 RX ORDER — SODIUM CHLORIDE, SODIUM LACTATE, POTASSIUM CHLORIDE, CALCIUM CHLORIDE 600; 310; 30; 20 MG/100ML; MG/100ML; MG/100ML; MG/100ML
INJECTION, SOLUTION INTRAVENOUS CONTINUOUS PRN
Status: DISCONTINUED | OUTPATIENT
Start: 2023-06-06 | End: 2023-06-06 | Stop reason: SDUPTHER

## 2023-06-06 RX ORDER — SODIUM CHLORIDE 0.9 % (FLUSH) 0.9 %
5-40 SYRINGE (ML) INJECTION PRN
Status: CANCELLED | OUTPATIENT
Start: 2023-06-06

## 2023-06-06 RX ADMIN — LIDOCAINE HYDROCHLORIDE 50 MG: 10 INJECTION, SOLUTION EPIDURAL; INFILTRATION; INTRACAUDAL; PERINEURAL at 10:20

## 2023-06-06 RX ADMIN — PROPOFOL 100 MG: 10 INJECTION, EMULSION INTRAVENOUS at 08:20

## 2023-06-06 RX ADMIN — SODIUM CHLORIDE, POTASSIUM CHLORIDE, SODIUM LACTATE AND CALCIUM CHLORIDE: 600; 310; 30; 20 INJECTION, SOLUTION INTRAVENOUS at 09:48

## 2023-06-06 RX ADMIN — PROPOFOL 100 MG: 10 INJECTION, EMULSION INTRAVENOUS at 10:20

## 2023-06-06 RX ADMIN — PROPOFOL 50 MG: 10 INJECTION, EMULSION INTRAVENOUS at 10:37

## 2023-06-06 RX ADMIN — SODIUM CHLORIDE, POTASSIUM CHLORIDE, SODIUM LACTATE AND CALCIUM CHLORIDE: 600; 310; 30; 20 INJECTION, SOLUTION INTRAVENOUS at 10:17

## 2023-06-06 RX ADMIN — PROPOFOL 100 MG: 10 INJECTION, EMULSION INTRAVENOUS at 10:32

## 2023-06-06 ASSESSMENT — PAIN - FUNCTIONAL ASSESSMENT: PAIN_FUNCTIONAL_ASSESSMENT: 0-10

## 2023-06-06 NOTE — DISCHARGE INSTRUCTIONS
Colonoscopy: What to Expect at Home  Your Recovery  Your doctor will talk to you about when you will need your next colonoscopy. The results of your test and your risk for colorectal cancer will help your doctor decide how often you need to be checked. After the test, you may be bloated or have gas pains. You may need to pass gas. If a biopsy was done or a polyp was removed, you may have streaks of blood in your stool (feces) for a few days. How can you care for yourself at home? Activity  Rest as much as you need to after you go home. You should be able to go back to your usual activities the day after the test.  Diet  Follow your doctors directions for eating. Drink plenty of fluids (unless your doctor has told you not to) to replace the fluids that were lost during the colon prep. Medicines  If polyps were removed or a biopsy was done during the test, your doctor may tell you not to take aspirin or other anti-inflammatory medicines, such as ibuprofen (Advil, Motrin) and naproxen (Aleve), for a few days. Follow-up care is a key part of your treatment and safety. Be sure to make and go to all appointments, and call your doctor if you are having problems. When should you call for help? Call 911 anytime you think you may need emergency care. For example, call if:  You passed out (lost consciousness). You pass maroon or bloody stools. You have severe belly pain. Call your doctor now or seek immediate medical care if:  Your stools are black and tarlike. Your stools have streaks of blood, but you did not have a biopsy or any polyps removed. You have belly pain, or your belly is swollen and firm. You vomit. You have a fever. You are very dizzy. Watch closely for changes in your health, and be sure to contact your doctor if you have any problems. Where can you learn more? Go to https://chapis.Queue-it. org and sign in to your [employer] account.  Enter E264 in the [address]

## 2023-06-06 NOTE — ANESTHESIA POSTPROCEDURE EVALUATION
Department of Anesthesiology  Postprocedure Note    Patient: Neville Pavon  MRN: [id number]  YOB: 1965  Date of evaluation: 6/6/2023      Procedure Summary     Date: 06/06/23 Room / Location: [facility] M1 / [facility] - INPATIENT    Anesthesia Start: 1017 Anesthesia Stop: 1335    Procedure: COLONOSCOPY WITH BIOPSY Diagnosis:       Internal hemorrhoids      Weight loss      Rectal bleeding      Tubulovillous adenoma      (Internal hemorrhoids [K64.8])      (Weight loss [R63.4])      (Rectal bleeding [K62.5])      (Tubulovillous adenoma [D36.9])    Surgeons: Zoraida Maldonado MD Responsible Provider: Librado Artis MD    Anesthesia Type: General ASA Status: 3          Anesthesia Type: General    Elizabeth Phase I: Elizabeth Score: 10    Elizabeth Phase II: Elizabeth Score: 8      Anesthesia Post Evaluation    Patient location during evaluation: PACU  Patient participation: complete - patient participated  Level of consciousness: awake  Airway patency: patent  Nausea & Vomiting: no nausea and no vomiting  Complications: no  Cardiovascular status: hemodynamically stable  Respiratory status: acceptable  Hydration status: stable  There was medical reason for not using a multimodal analgesia pain management approach.

## 2023-06-06 NOTE — ANESTHESIA PRE PROCEDURE
Tests: No results for input(s): POCGLU, POCNA, POCK, POCCL, POCBUN, POCHEMO, POCHCT in the last 72 hours. Coags:   Lab Results   Component Value Date/Time    PROTIME 10.3 09/07/2017 03:10 PM    INR 1.0 09/07/2017 03:10 PM    APTT 28.5 09/07/2017 03:10 PM       HCG (If Applicable):   Lab Results   Component Value Date    HCG NEGATIVE 08/30/2012        ABGs: No results found for: PHART, PO2ART, WVU3LAX, GOD1SUG, BEART, R2OCQYLX     Type & Screen (If Applicable):  No results found for: LABABO, LABRH    Drug/Infectious Status (If Applicable):  No results found for: HIV, HEPCAB    COVID-19 Screening (If Applicable):   Lab Results   Component Value Date/Time    COVID19 Not Detected 04/13/2023 04:17 PM           Anesthesia Evaluation  Patient summary reviewed and Nursing notes reviewed no history of anesthetic complications:   Airway: Mallampati: II  TM distance: >3 FB   Neck ROM: limited  Mouth opening: > = 3 FB   Dental:    (+) upper dentures and edentulous      Pulmonary:   (+) sleep apnea:  asthma:                            Cardiovascular:  Exercise tolerance: no interval change,   (+) hypertension:, valvular problems/murmurs: MVP,     (-) CABG/stent    ECG reviewed               Beta Blocker:  Not on Beta Blocker         Neuro/Psych:   (+) seizures: well controlled, CVA (2014):,             GI/Hepatic/Renal:   (+) GERD: well controlled,      (-) liver disease and no renal disease       Endo/Other:    (+) hypothyroidism: arthritis: rheumatoid. , .                 Abdominal:             Vascular:     - DVT and PE. Other Findings:           Anesthesia Plan      MAC     ASA 3       Induction: intravenous. MIPS: prophylactic pharmacologic antiemetic agents not administered perioperatively for documented reasons. Anesthetic plan and risks discussed with patient. Plan discussed with CRNA.     Attending anesthesiologist reviewed and agrees with Kennedi Burch MD

## 2023-06-06 NOTE — OP NOTE
PROCEDURE NOTE    DATE OF PROCEDURE: 6/6/2023    SURGEON: Rachel Hillman MD    ASSISTANT: None    PREOPERATIVE DIAGNOSIS: SCREENING  HX OF COLON POLYPS  IBS C    POSTOPERATIVE DIAGNOSIS: as described below    OPERATION: Total colonoscopy     ANESTHESIA: MAC PER ANESTHESIA     ESTIMATED BLOOD LOSS: less than 50     COMPLICATIONS: None. SPECIMENS:  Was Obtained:     HISTORY: The patient is a 62y.o. year old female with history of above preop diagnosis. I recommended colonoscopy with possible biopsy or polypectomy and I explained the risk, benefits, expected outcome, and alternatives to the procedure. Risks included but are not limited to bleeding, infection, respiratory distress, hypotension, and perforation of the colon and possibility of missing a lesion. The patient understands and is in agreement. PROCEDURE: The patient was given IV conscious sedation. The patient's SPO2 remained above 90% throughout the procedure. The colonoscope was inserted per rectum and advanced under direct vision to the cecum without difficulty. The prep was good. STICKY PASTY STOOLS WITH SPASTIC CONTRACTIONS   Findings:  Terminal ileum: normal    Cecum/Ascending colon: abnormal: A DIMINUTIVE POLYP WAS BIOPSIED A COLON     Transverse colon: normal    Descending/Sigmoid colon: abnormal: FEW DIVERTICULIS  SPASTIC COLON     Rectum/Anus: examined in normal and retroflexed positions and was abnormal: INT HEMORRHOIDS    Withdrawal Time was (minutes): 10    The colon was decompressed and the scope was removed. The patient tolerated the procedure well. Recommendations/Plan:   Lifestyle and dietary modifications as discussed  F/U Biopsies  F/U In Office in 3-4 weeks  Discussed with the family  Colonoscopy Recall :5 year  POST SEDATION PATIENT WAS STABLE WITH STABLE VITAL SIGNS AND OXYGEN SATURATIONS AND WAS DISCHARGED HOME WITH RIDE IN A STABLE CONDITION.     Electronically signed by Rachel Hillman MD  on 6/6/2023 at 10:42 AM

## 2023-06-08 LAB — SURGICAL PATHOLOGY REPORT: NORMAL

## 2023-06-15 DIAGNOSIS — K62.5 RECTAL BLEEDING: ICD-10-CM

## 2023-06-15 DIAGNOSIS — K21.9 GASTROESOPHAGEAL REFLUX DISEASE, UNSPECIFIED WHETHER ESOPHAGITIS PRESENT: ICD-10-CM

## 2023-06-15 DIAGNOSIS — K64.8 INTERNAL HEMORRHOIDS: ICD-10-CM

## 2023-06-15 DIAGNOSIS — R63.4 WEIGHT LOSS: ICD-10-CM

## 2023-06-15 DIAGNOSIS — D36.9 TUBULOVILLOUS ADENOMA: ICD-10-CM

## 2023-06-26 ENCOUNTER — OFFICE VISIT (OUTPATIENT)
Dept: OBGYN CLINIC | Age: 58
End: 2023-06-26
Payer: COMMERCIAL

## 2023-06-26 VITALS
DIASTOLIC BLOOD PRESSURE: 72 MMHG | HEIGHT: 61 IN | HEART RATE: 88 BPM | WEIGHT: 117 LBS | BODY MASS INDEX: 22.09 KG/M2 | SYSTOLIC BLOOD PRESSURE: 110 MMHG

## 2023-06-26 DIAGNOSIS — Z12.31 ENCOUNTER FOR SCREENING MAMMOGRAM FOR MALIGNANT NEOPLASM OF BREAST: ICD-10-CM

## 2023-06-26 DIAGNOSIS — Z01.419 WELL WOMAN EXAM WITH ROUTINE GYNECOLOGICAL EXAM: Primary | ICD-10-CM

## 2023-06-26 DIAGNOSIS — N95.2 ATROPHIC VAGINITIS: ICD-10-CM

## 2023-06-26 DIAGNOSIS — N94.10 DYSPAREUNIA IN FEMALE: ICD-10-CM

## 2023-06-26 DIAGNOSIS — R60.0 EDEMA OF BOTH LEGS: ICD-10-CM

## 2023-06-26 DIAGNOSIS — Z90.711 HISTORY OF HYSTERECTOMY, SUPRACERVICAL: ICD-10-CM

## 2023-06-26 PROCEDURE — G0101 CA SCREEN;PELVIC/BREAST EXAM: HCPCS | Performed by: SPECIALIST

## 2023-06-26 RX ORDER — ESTRADIOL 0.1 MG/G
1 CREAM VAGINAL
Qty: 42.5 G | Refills: 2 | Status: SHIPPED | OUTPATIENT
Start: 2023-06-26

## 2023-06-26 SDOH — ECONOMIC STABILITY: INCOME INSECURITY: HOW HARD IS IT FOR YOU TO PAY FOR THE VERY BASICS LIKE FOOD, HOUSING, MEDICAL CARE, AND HEATING?: NOT HARD AT ALL

## 2023-06-26 SDOH — ECONOMIC STABILITY: HOUSING INSECURITY
IN THE LAST 12 MONTHS, WAS THERE A TIME WHEN YOU DID NOT HAVE A STEADY PLACE TO SLEEP OR SLEPT IN A SHELTER (INCLUDING NOW)?: NO

## 2023-06-26 SDOH — ECONOMIC STABILITY: FOOD INSECURITY: WITHIN THE PAST 12 MONTHS, YOU WORRIED THAT YOUR FOOD WOULD RUN OUT BEFORE YOU GOT MONEY TO BUY MORE.: NEVER TRUE

## 2023-06-26 SDOH — ECONOMIC STABILITY: FOOD INSECURITY: WITHIN THE PAST 12 MONTHS, THE FOOD YOU BOUGHT JUST DIDN'T LAST AND YOU DIDN'T HAVE MONEY TO GET MORE.: NEVER TRUE

## 2023-06-26 ASSESSMENT — PATIENT HEALTH QUESTIONNAIRE - PHQ9
SUM OF ALL RESPONSES TO PHQ9 QUESTIONS 1 & 2: 2
SUM OF ALL RESPONSES TO PHQ QUESTIONS 1-9: 2
SUM OF ALL RESPONSES TO PHQ QUESTIONS 1-9: 2
1. LITTLE INTEREST OR PLEASURE IN DOING THINGS: 1
SUM OF ALL RESPONSES TO PHQ QUESTIONS 1-9: 2
2. FEELING DOWN, DEPRESSED OR HOPELESS: 1
SUM OF ALL RESPONSES TO PHQ QUESTIONS 1-9: 2

## 2023-06-26 ASSESSMENT — ENCOUNTER SYMPTOMS
ABDOMINAL PAIN: 0
NAUSEA: 0
EYE PAIN: 0
VOMITING: 0
DIARRHEA: 0
APNEA: 0
COUGH: 0
ABDOMINAL DISTENTION: 0
CONSTIPATION: 0

## 2023-06-27 PROBLEM — N94.10 DYSPAREUNIA IN FEMALE: Status: ACTIVE | Noted: 2023-06-27

## 2023-06-27 PROBLEM — R60.0 EDEMA OF BOTH LEGS: Status: ACTIVE | Noted: 2023-06-27

## 2023-07-03 ENCOUNTER — TELEPHONE (OUTPATIENT)
Dept: GASTROENTEROLOGY | Age: 58
End: 2023-07-03

## 2023-08-01 ENCOUNTER — OFFICE VISIT (OUTPATIENT)
Dept: GASTROENTEROLOGY | Age: 58
End: 2023-08-01
Payer: COMMERCIAL

## 2023-08-01 VITALS
TEMPERATURE: 97.5 F | WEIGHT: 114.8 LBS | BODY MASS INDEX: 21.69 KG/M2 | DIASTOLIC BLOOD PRESSURE: 64 MMHG | SYSTOLIC BLOOD PRESSURE: 100 MMHG

## 2023-08-01 DIAGNOSIS — K58.1 IRRITABLE BOWEL SYNDROME WITH CONSTIPATION: Primary | ICD-10-CM

## 2023-08-01 DIAGNOSIS — R63.4 WEIGHT LOSS: ICD-10-CM

## 2023-08-01 DIAGNOSIS — K21.9 GASTROESOPHAGEAL REFLUX DISEASE, UNSPECIFIED WHETHER ESOPHAGITIS PRESENT: ICD-10-CM

## 2023-08-01 DIAGNOSIS — R10.84 ABDOMINAL PAIN, GENERALIZED: ICD-10-CM

## 2023-08-01 PROCEDURE — 3017F COLORECTAL CA SCREEN DOC REV: CPT | Performed by: INTERNAL MEDICINE

## 2023-08-01 PROCEDURE — G8420 CALC BMI NORM PARAMETERS: HCPCS | Performed by: INTERNAL MEDICINE

## 2023-08-01 PROCEDURE — 1036F TOBACCO NON-USER: CPT | Performed by: INTERNAL MEDICINE

## 2023-08-01 PROCEDURE — G8427 DOCREV CUR MEDS BY ELIG CLIN: HCPCS | Performed by: INTERNAL MEDICINE

## 2023-08-01 PROCEDURE — 99214 OFFICE O/P EST MOD 30 MIN: CPT | Performed by: INTERNAL MEDICINE

## 2023-08-01 RX ORDER — PSYLLIUM HUSK/CALCIUM CARB 1 G-60 MG
1 CAPSULE ORAL DAILY
Qty: 120 CAPSULE | Refills: 11 | Status: SHIPPED | OUTPATIENT
Start: 2023-08-01

## 2023-08-01 ASSESSMENT — ENCOUNTER SYMPTOMS
NAUSEA: 0
WHEEZING: 0
RECTAL PAIN: 0
CONSTIPATION: 0
VOICE CHANGE: 0
ABDOMINAL DISTENTION: 0
BLOOD IN STOOL: 0
SHORTNESS OF BREATH: 0
DIARRHEA: 0
COUGH: 0
ANAL BLEEDING: 0
SORE THROAT: 0
CHOKING: 0
ABDOMINAL PAIN: 0
TROUBLE SWALLOWING: 0
VOMITING: 1

## 2023-08-01 NOTE — PROGRESS NOTES
to establish the good bacterial dick and will help with the digestion and bowel movements  The patient has verbalized understanding and agreement to this plan        Thank you for allowing me to participate in the care of Ms. Bill. For any further questions please do not hesitate to contact me. I have reviewed and agree with the ROS entered by the MA/Nurse. This note is created with the assistance of the speech recognition program.  While intending to generate a document that actually reflects the content of the visit, document can still have some errors including those of syntax and sound like substitutions which may escape proof reading. Actual meaning can be extrapolated by contextual diversion.      Mackenzie Cornelius MD, [facility]  Board Certified in Gastroenterology and [facility]  Office #: [phone]

## 2023-08-31 RX ORDER — DICYCLOMINE HYDROCHLORIDE 10 MG/1
CAPSULE ORAL
Qty: 20 CAPSULE | Refills: 0 | OUTPATIENT
Start: 2023-08-31

## 2023-09-12 ENCOUNTER — HOSPITAL ENCOUNTER (EMERGENCY)
Age: 58
Discharge: HOME | End: 2023-09-13
Attending: STUDENT IN AN ORGANIZED HEALTH CARE EDUCATION/TRAINING PROGRAM
Payer: OTHER MISCELLANEOUS

## 2023-09-12 ENCOUNTER — APPOINTMENT (OUTPATIENT)
Dept: CT IMAGING | Age: 58
End: 2023-09-12
Payer: OTHER MISCELLANEOUS

## 2023-09-12 ENCOUNTER — APPOINTMENT (OUTPATIENT)
Dept: GENERAL RADIOLOGY | Age: 58
End: 2023-09-12
Payer: OTHER MISCELLANEOUS

## 2023-09-12 DIAGNOSIS — S22.31XA CLOSED FRACTURE OF ONE RIB OF RIGHT SIDE, INITIAL ENCOUNTER: ICD-10-CM

## 2023-09-12 DIAGNOSIS — S22.20XA CLOSED FRACTURE OF STERNUM, UNSPECIFIED PORTION OF STERNUM, INITIAL ENCOUNTER: ICD-10-CM
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
## 2023-09-12 LAB
ABO + RH BLD: NORMAL
ANION GAP SERPL CALCULATED.3IONS-SCNC: 8 MMOL/L (ref 9–17)
ARM BAND NUMBER: NORMAL
ARTERIAL PATENCY WRIST A: ABNORMAL
BDY SITE: ABNORMAL
BLOOD BANK SAMPLE EXPIRATION: NORMAL
BLOOD BANK SPECIMEN: ABNORMAL
BLOOD GROUP ANTIBODIES SERPL: NEGATIVE
BODY TEMPERATURE: ABNORMAL
BREATHS.MECHANICAL ON VENT: ABNORMAL
BUN SERPL-MCNC: 12 MG/DL (ref 6–20)
CHLORIDE SERPL-SCNC: 100 MMOL/L (ref 98–107)
CO2 SERPL-SCNC: 27 MMOL/L (ref 20–31)
COHGB MFR BLD: ABNORMAL %
CREAT SERPL-MCNC: 0.5 MG/DL (ref 0.5–0.9)
ERYTHROCYTE [DISTWIDTH] IN BLOOD BY AUTOMATED COUNT: 13.1 % (ref 11.5–14.9)
ETHANOL PERCENT: <0.01 %
ETHANOLAMINE SERPL-MCNC: <10 MG/DL
FIO2 ON VENT: ABNORMAL %
GAS FLOW.O2 O2 DELIVERY SYS: ABNORMAL L/MIN
GAS FLOW.O2 SETTING OXYMISER: ABNORMAL L/MIN
GFR SERPL CREATININE-BSD FRML MDRD: >60 ML/MIN/1.73M2
GLUCOSE SERPL-MCNC: 117 MG/DL (ref 70–99)
HCG SERPL QL: NEGATIVE
HCO3 VENOUS: ABNORMAL MMOL/L (ref 24–30)
HCT VFR BLD AUTO: 36.5 % (ref 36–46)
HGB BLD-MCNC: 12 G/DL (ref 12–16)
HGB BLDMV-MCNC: ABNORMAL G/DL (ref 12–16)
INR PPP: 1.1
MCH RBC QN AUTO: 31.8 PG (ref 26–34)
MCHC RBC AUTO-ENTMCNC: 32.8 G/DL (ref 31–37)
MCV RBC AUTO: 97.1 FL (ref 80–100)
METHEMOGLOBIN: ABNORMAL %
NEGATIVE BASE EXCESS, VEN: ABNORMAL MMOL/L (ref 0–2)
NOTIFICATION TIME: ABNORMAL
NOTIFICATION: ABNORMAL
O2 SAT, VEN: ABNORMAL %
OXYHGB MFR BLD: ABNORMAL % (ref 95–98)
PARTIAL THROMBOPLASTIN TIME: 30.4 SEC (ref 24–36)
PCO2, VEN, TEMP ADJ: ABNORMAL MMHG (ref 39–55)
PCO2, VEN: ABNORMAL MM HG (ref 39–55)
PEEP RESPIRATORY: ABNORMAL CM[H2O]
PH VENOUS: ABNORMAL (ref 7.32–7.42)
PH, VEN, TEMP ADJ: ABNORMAL (ref 7.32–7.42)
PLATELET # BLD AUTO: 155 K/UL (ref 150–450)
PMV BLD AUTO: 8.3 FL (ref 6–12)
PO2, VEN, TEMP ADJ: ABNORMAL MMHG (ref 30–50)
PO2, VEN: ABNORMAL MM HG (ref 30–50)
POSITIVE BASE EXCESS, VEN: ABNORMAL MMOL/L (ref 0–2)
POTASSIUM SERPL-SCNC: 3.6 MMOL/L (ref 3.7–5.3)
PROTHROMBIN TIME: 14 SEC (ref 11.8–14.6)
PT. POSITION: ABNORMAL
RBC # BLD AUTO: 3.76 M/UL (ref 4–5.2)
RESPIRATORY RATE: ABNORMAL
SODIUM SERPL-SCNC: 135 MMOL/L (ref 135–144)
TEXT FOR RESPIRATORY: ABNORMAL
TOTAL RATE: ABNORMAL
VENTILATION MODE VENT: ABNORMAL
VT: ABNORMAL
WBC OTHER # BLD: 3.3 K/UL (ref 3.5–11)

## 2023-09-12 PROCEDURE — 86901 BLOOD TYPING SEROLOGIC RH(D): CPT

## 2023-09-12 PROCEDURE — 96374 THER/PROPH/DIAG INJ IV PUSH: CPT

## 2023-09-12 PROCEDURE — 86900 BLOOD TYPING SEROLOGIC ABO: CPT

## 2023-09-12 PROCEDURE — 99285 EMERGENCY DEPT VISIT HI MDM: CPT

## 2023-09-12 PROCEDURE — 84703 CHORIONIC GONADOTROPIN ASSAY: CPT

## 2023-09-12 PROCEDURE — 36415 COLL VENOUS BLD VENIPUNCTURE: CPT

## 2023-09-12 PROCEDURE — 73562 X-RAY EXAM OF KNEE 3: CPT

## 2023-09-12 PROCEDURE — 73110 X-RAY EXAM OF WRIST: CPT

## 2023-09-12 PROCEDURE — G0480 DRUG TEST DEF 1-7 CLASSES: HCPCS

## 2023-09-12 PROCEDURE — 85730 THROMBOPLASTIN TIME PARTIAL: CPT

## 2023-09-12 PROCEDURE — 80051 ELECTROLYTE PANEL: CPT

## 2023-09-12 PROCEDURE — 82805 BLOOD GASES W/O2 SATURATION: CPT

## 2023-09-12 PROCEDURE — 85027 COMPLETE CBC AUTOMATED: CPT

## 2023-09-12 PROCEDURE — 86850 RBC ANTIBODY SCREEN: CPT

## 2023-09-12 PROCEDURE — 85610 PROTHROMBIN TIME: CPT

## 2023-09-12 PROCEDURE — 84520 ASSAY OF UREA NITROGEN: CPT

## 2023-09-12 PROCEDURE — 82565 ASSAY OF CREATININE: CPT

## 2023-09-12 PROCEDURE — 82947 ASSAY GLUCOSE BLOOD QUANT: CPT

## 2023-09-12 RX ORDER — FENTANYL CITRATE 0.05 MG/ML
50 INJECTION, SOLUTION INTRAMUSCULAR; INTRAVENOUS ONCE
Status: COMPLETED | OUTPATIENT
Start: 2023-09-12 | End: 2023-09-13

## 2023-09-12 RX ORDER — 0.9 % SODIUM CHLORIDE 0.9 %
1000 INTRAVENOUS SOLUTION INTRAVENOUS ONCE
Status: COMPLETED | OUTPATIENT
Start: 2023-09-12 | End: 2023-09-13

## 2023-09-12 ASSESSMENT — PAIN DESCRIPTION - LOCATION: LOCATION: BACK

## 2023-09-12 ASSESSMENT — PAIN DESCRIPTION - ORIENTATION: ORIENTATION: LOWER

## 2023-09-12 ASSESSMENT — ENCOUNTER SYMPTOMS
SORE THROAT: 0
SHORTNESS OF BREATH: 0
ABDOMINAL PAIN: 0
BACK PAIN: 1
RHINORRHEA: 0
EYE REDNESS: 0
NAUSEA: 0
VOMITING: 0
EYE DISCHARGE: 0
DIARRHEA: 0

## 2023-09-12 ASSESSMENT — PAIN SCALES - GENERAL: PAINLEVEL_OUTOF10: 8

## 2023-09-12 ASSESSMENT — PAIN - FUNCTIONAL ASSESSMENT: PAIN_FUNCTIONAL_ASSESSMENT: 0-10

## 2023-09-12 ASSESSMENT — PAIN DESCRIPTION - PAIN TYPE: TYPE: ACUTE PAIN

## 2023-09-12 ASSESSMENT — LIFESTYLE VARIABLES
HOW OFTEN DO YOU HAVE A DRINK CONTAINING ALCOHOL: NEVER
HOW MANY STANDARD DRINKS CONTAINING ALCOHOL DO YOU HAVE ON A TYPICAL DAY: PATIENT DOES NOT DRINK

## 2023-09-13 ENCOUNTER — APPOINTMENT (OUTPATIENT)
Dept: CT IMAGING | Age: 58
End: 2023-09-13
Payer: OTHER MISCELLANEOUS

## 2023-09-13 VITALS
TEMPERATURE: 97 F | RESPIRATION RATE: 12 BRPM | BODY MASS INDEX: 21.71 KG/M2 | HEART RATE: 63 BPM | SYSTOLIC BLOOD PRESSURE: 113 MMHG | HEIGHT: 61 IN | OXYGEN SATURATION: 95 % | WEIGHT: 115 LBS | DIASTOLIC BLOOD PRESSURE: 71 MMHG

## 2023-09-13 LAB — TROPONIN I SERPL HS-MCNC: 9 NG/L (ref 0–14)

## 2023-09-13 PROCEDURE — 84484 ASSAY OF TROPONIN QUANT: CPT

## 2023-09-13 PROCEDURE — 93005 ELECTROCARDIOGRAM TRACING: CPT

## 2023-09-13 PROCEDURE — 72131 CT LUMBAR SPINE W/O DYE: CPT

## 2023-09-13 PROCEDURE — 93005 ELECTROCARDIOGRAM TRACING: CPT | Performed by: STUDENT IN AN ORGANIZED HEALTH CARE EDUCATION/TRAINING PROGRAM

## 2023-09-13 PROCEDURE — 82800 BLOOD PH: CPT

## 2023-09-13 PROCEDURE — 6360000002 HC RX W HCPCS: Performed by: STUDENT IN AN ORGANIZED HEALTH CARE EDUCATION/TRAINING PROGRAM

## 2023-09-13 PROCEDURE — 72128 CT CHEST SPINE W/O DYE: CPT

## 2023-09-13 PROCEDURE — 72125 CT NECK SPINE W/O DYE: CPT

## 2023-09-13 PROCEDURE — 6360000004 HC RX CONTRAST MEDICATION: Performed by: STUDENT IN AN ORGANIZED HEALTH CARE EDUCATION/TRAINING PROGRAM

## 2023-09-13 PROCEDURE — 70450 CT HEAD/BRAIN W/O DYE: CPT

## 2023-09-13 PROCEDURE — 2580000003 HC RX 258: Performed by: STUDENT IN AN ORGANIZED HEALTH CARE EDUCATION/TRAINING PROGRAM

## 2023-09-13 PROCEDURE — 71260 CT THORAX DX C+: CPT

## 2023-09-13 PROCEDURE — 36415 COLL VENOUS BLD VENIPUNCTURE: CPT

## 2023-09-13 PROCEDURE — 96374 THER/PROPH/DIAG INJ IV PUSH: CPT

## 2023-09-13 RX ORDER — LIDOCAINE 50 MG/G
1 PATCH TOPICAL DAILY
Qty: 10 PATCH | Refills: 0 | Status: SHIPPED | OUTPATIENT
Start: 2023-09-13 | End: 2023-09-23

## 2023-09-13 RX ORDER — SODIUM CHLORIDE 0.9 % (FLUSH) 0.9 %
10 SYRINGE (ML) INJECTION PRN
Status: DISCONTINUED | OUTPATIENT
Start: 2023-09-13 | End: 2023-09-13 | Stop reason: HOSPADM

## 2023-09-13 RX ORDER — HYDROCODONE BITARTRATE AND ACETAMINOPHEN 5; 325 MG/1; MG/1
1 TABLET ORAL EVERY 6 HOURS PRN
Qty: 10 TABLET | Refills: 0 | Status: SHIPPED | OUTPATIENT
Start: 2023-09-13 | End: 2023-09-16

## 2023-09-13 RX ORDER — 0.9 % SODIUM CHLORIDE 0.9 %
100 INTRAVENOUS SOLUTION INTRAVENOUS ONCE
Status: COMPLETED | OUTPATIENT
Start: 2023-09-13 | End: 2023-09-13

## 2023-09-13 RX ADMIN — SODIUM CHLORIDE 100 ML: 9 INJECTION, SOLUTION INTRAVENOUS at 00:36

## 2023-09-13 RX ADMIN — SODIUM CHLORIDE, PRESERVATIVE FREE 10 ML: 5 INJECTION INTRAVENOUS at 00:36

## 2023-09-13 RX ADMIN — FENTANYL CITRATE 50 MCG: 50 INJECTION INTRAMUSCULAR; INTRAVENOUS at 00:54

## 2023-09-13 RX ADMIN — IOPAMIDOL 75 ML: 755 INJECTION, SOLUTION INTRAVENOUS at 00:36

## 2023-09-13 RX ADMIN — SODIUM CHLORIDE 1000 ML: 9 INJECTION, SOLUTION INTRAVENOUS at 00:55

## 2023-09-13 ASSESSMENT — PAIN SCALES - GENERAL: PAINLEVEL_OUTOF10: 8

## 2023-09-13 NOTE — ED NOTES
Discharge instructions reviewed with patient, noting all directions and education by provider. Patient verbalizes understanding of all information reviewed, gathered personal items, and transferred under own power off unit to lobby without incident.       Melida Bullock RN  23/61/89 0065

## 2023-09-13 NOTE — PROGRESS NOTES
PANEL CLOSED, UNABLE TO ENTER VBG RESULTS.   1053 pm  VpH 7.410  VpCO2 44.5  VpO2 192  VHCO3 28.2  VBE 3.5
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
## 2023-09-13 NOTE — ED NOTES
Instructed on how to use the incentive spirometer. Patient verbalized understanding.        Hugh Mera RN  [phone]

## 2023-09-21 RX ORDER — ESTRADIOL 0.1 MG/G
CREAM VAGINAL
Qty: 42.5 G | Refills: 2 | Status: SHIPPED | OUTPATIENT
Start: 2023-09-21

## 2023-09-26 ENCOUNTER — OFFICE VISIT (OUTPATIENT)
Dept: OBGYN CLINIC | Age: 58
End: 2023-09-26
Payer: COMMERCIAL

## 2023-09-26 ENCOUNTER — TELEPHONE (OUTPATIENT)
Dept: DERMATOLOGY | Age: 58
End: 2023-09-26

## 2023-09-26 VITALS
WEIGHT: 115 LBS | DIASTOLIC BLOOD PRESSURE: 78 MMHG | HEART RATE: 70 BPM | HEIGHT: 61 IN | BODY MASS INDEX: 21.71 KG/M2 | SYSTOLIC BLOOD PRESSURE: 110 MMHG

## 2023-09-26 DIAGNOSIS — L98.9 SKIN LESIONS: Primary | ICD-10-CM

## 2023-09-26 PROCEDURE — G8420 CALC BMI NORM PARAMETERS: HCPCS | Performed by: SPECIALIST

## 2023-09-26 PROCEDURE — G8427 DOCREV CUR MEDS BY ELIG CLIN: HCPCS | Performed by: SPECIALIST

## 2023-09-26 PROCEDURE — 3017F COLORECTAL CA SCREEN DOC REV: CPT | Performed by: SPECIALIST

## 2023-09-26 PROCEDURE — 99213 OFFICE O/P EST LOW 20 MIN: CPT | Performed by: SPECIALIST

## 2023-09-26 PROCEDURE — 1036F TOBACCO NON-USER: CPT | Performed by: SPECIALIST

## 2023-09-26 ASSESSMENT — ENCOUNTER SYMPTOMS
ABDOMINAL DISTENTION: 0
COUGH: 0
CONSTIPATION: 0
APNEA: 0
NAUSEA: 0
ABDOMINAL PAIN: 0
VOMITING: 0
DIARRHEA: 0
EYE PAIN: 0

## 2023-09-26 NOTE — PROGRESS NOTES
2 times daily. 30 g 1    estradiol (ESTRACE) 1 MG tablet take 1 tablet by mouth once daily 28 tablet 1    furosemide (LASIX) 20 MG tablet 1 tablet      RA VITAMIN B-12 TR 1000 MCG TBCR   0    ranitidine (ZANTAC) 150 MG tablet   0    topiramate (TOPAMAX) 50 MG tablet Take 1 tablet by mouth 2 times daily (Patient taking differently: Take 2 tablets by mouth 2 times daily) 60 tablet 3    lamoTRIgine (LAMICTAL) 200 MG tablet Take 1 tablet by mouth 2 times daily  1    gabapentin (NEURONTIN) 800 MG tablet Take 1 tablet by mouth three times daily. pantoprazole (PROTONIX) 20 MG tablet Take 1 tablet by mouth daily      hydrochlorothiazide (HYDRODIURIL) 25 MG tablet Take 1 tablet by mouth daily      levothyroxine (SYNTHROID) 75 MCG tablet Take 1 tablet by mouth daily. On empty stomach 30 tablet 5    folic acid (FOLVITE) 1 MG tablet Take 1 tablet by mouth daily      dicyclomine (BENTYL) 10 MG capsule Take 1 capsule by mouth 4 times daily for 5 days 20 capsule 0     No current Epic-ordered facility-administered medications on file. Problem List Items Addressed This Visit       Skin lesions - Primary    Relevant Orders    Taina Coreas MD, Dermatology, [facility]     Allergies   Allergen Reactions    Latex     Aspirin     Cortisone     Dye [Iodides] Other (See Comments)     IV dye leaked into arm and caused local swelling in that area    Ibuprofen     Influenza Vaccines      On orencia, for RA    Penicillins      Orders Placed This Encounter   Procedures    Taina Coreas MD, Dermatology, [facility]     Referral Priority:   Routine     Referral Type:   Eval and Treat     Referral Reason:   Specialty Services Required     Referred to Provider:   Pau Schwab MD     Requested Specialty:   Dermatology     Number of Visits Requested:   1          HPI  Patient is here today because she has a rash between her legs. She thinks it may be due to fleas. She states that the rash is also on her arms and legs.   She has

## 2023-09-27 LAB
EKG ATRIAL RATE: 84 BPM
EKG P AXIS: 65 DEGREES
EKG P-R INTERVAL: 152 MS
EKG Q-T INTERVAL: 364 MS
EKG QRS DURATION: 84 MS
EKG QTC CALCULATION (BAZETT): 430 MS
EKG R AXIS: -31 DEGREES
EKG T AXIS: 39 DEGREES
EKG VENTRICULAR RATE: 84 BPM

## 2023-11-10 DIAGNOSIS — A60.00 GENITAL HERPES SIMPLEX, UNSPECIFIED SITE: ICD-10-CM

## 2023-11-10 RX ORDER — VALACYCLOVIR HYDROCHLORIDE 500 MG/1
TABLET, FILM COATED ORAL
Qty: 30 TABLET | Refills: 2 | Status: SHIPPED | OUTPATIENT
Start: 2023-11-10

## 2023-12-02 RX ORDER — ESTRADIOL 0.1 MG/G
CREAM VAGINAL
Qty: 42.5 G | Refills: 2 | Status: SHIPPED | OUTPATIENT
Start: 2023-12-02

## 2023-12-07 ENCOUNTER — OFFICE VISIT (OUTPATIENT)
Dept: GASTROENTEROLOGY | Age: 58
End: 2023-12-07
Payer: MEDICARE

## 2023-12-07 VITALS
BODY MASS INDEX: 21.92 KG/M2 | TEMPERATURE: 98.6 F | DIASTOLIC BLOOD PRESSURE: 67 MMHG | WEIGHT: 116 LBS | SYSTOLIC BLOOD PRESSURE: 104 MMHG

## 2023-12-07 DIAGNOSIS — R15.1 FECAL SMEARING: ICD-10-CM

## 2023-12-07 DIAGNOSIS — R10.84 ABDOMINAL PAIN, GENERALIZED: Primary | ICD-10-CM

## 2023-12-07 DIAGNOSIS — D36.9 TUBULOVILLOUS ADENOMA: ICD-10-CM

## 2023-12-07 DIAGNOSIS — K58.8 OTHER IRRITABLE BOWEL SYNDROME: ICD-10-CM

## 2023-12-07 DIAGNOSIS — K21.9 GASTROESOPHAGEAL REFLUX DISEASE, UNSPECIFIED WHETHER ESOPHAGITIS PRESENT: ICD-10-CM

## 2023-12-07 PROCEDURE — 1036F TOBACCO NON-USER: CPT | Performed by: INTERNAL MEDICINE

## 2023-12-07 PROCEDURE — 3017F COLORECTAL CA SCREEN DOC REV: CPT | Performed by: INTERNAL MEDICINE

## 2023-12-07 PROCEDURE — G8420 CALC BMI NORM PARAMETERS: HCPCS | Performed by: INTERNAL MEDICINE

## 2023-12-07 PROCEDURE — G8427 DOCREV CUR MEDS BY ELIG CLIN: HCPCS | Performed by: INTERNAL MEDICINE

## 2023-12-07 PROCEDURE — G8484 FLU IMMUNIZE NO ADMIN: HCPCS | Performed by: INTERNAL MEDICINE

## 2023-12-07 PROCEDURE — 99214 OFFICE O/P EST MOD 30 MIN: CPT | Performed by: INTERNAL MEDICINE

## 2023-12-07 RX ORDER — DOCUSATE SODIUM 100 MG/1
100 CAPSULE, LIQUID FILLED ORAL 2 TIMES DAILY
Qty: 60 CAPSULE | Refills: 0 | Status: SHIPPED | OUTPATIENT
Start: 2023-12-07 | End: 2024-01-06

## 2023-12-07 RX ORDER — PSYLLIUM HUSK/CALCIUM CARB 1 G-60 MG
2 CAPSULE ORAL 2 TIMES DAILY
Qty: 120 CAPSULE | Refills: 11 | Status: SHIPPED | OUTPATIENT
Start: 2023-12-07

## 2023-12-07 ASSESSMENT — ENCOUNTER SYMPTOMS
VOICE CHANGE: 0
VOMITING: 0
ABDOMINAL PAIN: 0
ANAL BLEEDING: 0
COUGH: 0
WHEEZING: 0
CONSTIPATION: 0
BLOOD IN STOOL: 0
TROUBLE SWALLOWING: 0
NAUSEA: 0
RECTAL PAIN: 0
SHORTNESS OF BREATH: 0
DIARRHEA: 0
ABDOMINAL DISTENTION: 0
SORE THROAT: 0
CHOKING: 0
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
## 2023-12-27 ENCOUNTER — OFFICE VISIT (OUTPATIENT)
Dept: DERMATOLOGY | Age: 58
End: 2023-12-27
Payer: MEDICARE

## 2023-12-27 VITALS
BODY MASS INDEX: 23.79 KG/M2 | OXYGEN SATURATION: 99 % | WEIGHT: 126 LBS | HEART RATE: 67 BPM | SYSTOLIC BLOOD PRESSURE: 87 MMHG | DIASTOLIC BLOOD PRESSURE: 55 MMHG | HEIGHT: 61 IN | TEMPERATURE: 98.2 F

## 2023-12-27 DIAGNOSIS — L82.1 SEBORRHEIC KERATOSES: Primary | ICD-10-CM

## 2023-12-27 DIAGNOSIS — I87.2 VENOUS STASIS DERMATITIS OF BOTH LOWER EXTREMITIES: ICD-10-CM

## 2023-12-27 PROCEDURE — 99202 OFFICE O/P NEW SF 15 MIN: CPT | Performed by: PHYSICIAN ASSISTANT

## 2023-12-27 PROCEDURE — G8484 FLU IMMUNIZE NO ADMIN: HCPCS | Performed by: PHYSICIAN ASSISTANT

## 2023-12-27 PROCEDURE — 3017F COLORECTAL CA SCREEN DOC REV: CPT | Performed by: PHYSICIAN ASSISTANT

## 2023-12-27 PROCEDURE — G8420 CALC BMI NORM PARAMETERS: HCPCS | Performed by: PHYSICIAN ASSISTANT

## 2023-12-27 PROCEDURE — G8427 DOCREV CUR MEDS BY ELIG CLIN: HCPCS | Performed by: PHYSICIAN ASSISTANT

## 2023-12-27 PROCEDURE — 1036F TOBACCO NON-USER: CPT | Performed by: PHYSICIAN ASSISTANT

## 2023-12-27 NOTE — PATIENT INSTRUCTIONS
- recommended the patient use compression stockings for swelling of legs  -------------------------------------------------------------------------  Seborrheic Keratosis  Seborrheic keratoses are common benign growths of unknown cause seen in adults due to a thickening of an area of the top skin layer. Who's At Risk  Although they can occur anytime after puberty, almost everyone over 48 has one or more of these and they increase in number with age. Some families have an inherited tendency to grow multiple lesions. Men and women are equally as likely to develop seborrheic keratoses. Dark-skinned people are less affected than those with light skin; a variant seen in blacks is called dermatosis papulosa nigra. Signs & Symptoms  One or more spots can occur anywhere on the body, except for palms, soles, and mucous membranes (eg, in the mouth or rectum). They do not go away. They do not turn into cancers, but some cancers resemble seborrheic keratosis. They start as light brown to skin-colored, flat areas, which are round to oval and of varying size (usually less than a half inch, but sometimes much larger). As they grow thicker and rise above the skin surface, seborrheic keratoses may become dark brown to almost black with a \"stuck on\" appearance. The surface may feel smooth or rough. Self-Care Guidelines  No treatment is needed unless there is irritation from clothing with itching or bleeding. There is no way to prevent new spots from forming. Some lotions with alpha hydroxyl acids may make the areas feel smoother with regular use but will not eliminate them. OTC freezing techniques are available but usually not effective. When to [facility]  If a spot on the skin is growing, bleeding, painful, or itchy, or any other concerning changes, then see your doctor.

## 2024-01-18 ENCOUNTER — HOSPITAL ENCOUNTER (OUTPATIENT)
Age: 59
Discharge: HOME | End: 2024-01-18
Payer: MEDICARE

## 2024-01-18 ENCOUNTER — HOSPITAL ENCOUNTER (OUTPATIENT)
Dept: GENERAL RADIOLOGY | Age: 59
Discharge: HOME | End: 2024-01-20
Payer: MEDICARE

## 2024-01-18 ENCOUNTER — HOSPITAL ENCOUNTER (OUTPATIENT)
Age: 59
Discharge: HOME | End: 2024-01-20
Payer: MEDICARE

## 2024-01-18 DIAGNOSIS — Z01.811 PRE-OP CHEST EXAM: ICD-10-CM

## 2024-01-18 PROCEDURE — 71046 X-RAY EXAM CHEST 2 VIEWS: CPT

## 2024-01-18 PROCEDURE — 93005 ELECTROCARDIOGRAM TRACING: CPT | Performed by: NURSE PRACTITIONER

## 2024-01-19 LAB
EKG ATRIAL RATE: 70 BPM
EKG P AXIS: 77 DEGREES
EKG P-R INTERVAL: 158 MS
EKG Q-T INTERVAL: 380 MS
EKG QRS DURATION: 82 MS
EKG QTC CALCULATION (BAZETT): 410 MS
EKG R AXIS: 79 DEGREES
EKG T AXIS: 71 DEGREES
EKG VENTRICULAR RATE: 70 BPM

## 2024-03-01 RX ORDER — ESTRADIOL 0.1 MG/G
CREAM VAGINAL
Qty: 42.5 G | Refills: 2 | Status: SHIPPED | OUTPATIENT
Start: 2024-03-01

## 2024-06-21 ENCOUNTER — TELEPHONE (OUTPATIENT)
Dept: GASTROENTEROLOGY | Age: 59
End: 2024-06-21

## 2024-06-21 NOTE — TELEPHONE ENCOUNTER
Patient called back and stated she is having a severe IBS flare up and was seen at her PCP's office yesterday.  Patient was prescribed Flagyl.  Per patient they ordered some lab work and a stool panel.  Patient states she is going to these drawn and done today at [facility].  Awaiting results.     Writer did advise patient to follow up with office once results are available and if symptoms get worse, to please go to the ER.    Patient advised Dr. Muniz is on call this week.       Thank you    Karis

## 2024-06-21 NOTE — TELEPHONE ENCOUNTER
Patient called in and LVM stating she is having a severe IBS flare up.      Writer called patient back and was speaking with her about her symptoms when phone call dropped. Writer attempted to call patient back and it goes straight to .      Pending call back from patient.      Thank you!    Karis

## 2025-01-03 ENCOUNTER — TRANSCRIBE ORDERS (OUTPATIENT)
Dept: ADMINISTRATIVE | Age: 60
End: 2025-01-03

## 2025-01-03 DIAGNOSIS — R60.0 BILATERAL LOWER EXTREMITY EDEMA: Primary | ICD-10-CM

## 2025-02-18 ENCOUNTER — HOSPITAL ENCOUNTER (OUTPATIENT)
Dept: VASCULAR LAB | Age: 60
Discharge: HOME | End: 2025-02-20
Payer: MEDICARE

## 2025-02-18 ENCOUNTER — HOSPITAL ENCOUNTER (OUTPATIENT)
Age: 60
Discharge: HOME | End: 2025-02-20
Payer: MEDICARE

## 2025-02-18 VITALS
HEIGHT: 61 IN | SYSTOLIC BLOOD PRESSURE: 87 MMHG | HEART RATE: 67 BPM | BODY MASS INDEX: 23.79 KG/M2 | DIASTOLIC BLOOD PRESSURE: 55 MMHG | WEIGHT: 126 LBS

## 2025-02-18 DIAGNOSIS — R60.0 BILATERAL LOWER EXTREMITY EDEMA: ICD-10-CM

## 2025-02-18 LAB
ECHO AO ROOT DIAM: 2.9 CM
ECHO AO ROOT INDEX: 1.87 CM/M2
ECHO AV AREA PEAK VELOCITY: 1.9 CM2
ECHO AV AREA VTI: 1.9 CM2
ECHO AV AREA/BSA PEAK VELOCITY: 1.2 CM2/M2
ECHO AV AREA/BSA VTI: 1.2 CM2/M2
ECHO AV MEAN GRADIENT: 4 MMHG
ECHO AV MEAN VELOCITY: 0.9 M/S
ECHO AV PEAK GRADIENT: 7 MMHG
ECHO AV PEAK VELOCITY: 1.3 M/S
ECHO AV VELOCITY RATIO: 0.77
ECHO AV VTI: 33.3 CM
ECHO BSA: 1.57 M2
ECHO EST RA PRESSURE: 3 MMHG
ECHO LA AREA 2C: 13.1 CM2
ECHO LA AREA 4C: 13.3 CM2
ECHO LA DIAMETER INDEX: 1.74 CM/M2
ECHO LA DIAMETER: 2.7 CM
ECHO LA MAJOR AXIS: 5.3 CM
ECHO LA MINOR AXIS: 4.6 CM
ECHO LA TO AORTIC ROOT RATIO: 0.93
ECHO LA VOL BP: 30 ML (ref 22–52)
ECHO LA VOL MOD A2C: 30 ML (ref 22–52)
ECHO LA VOL MOD A4C: 26 ML (ref 22–52)
ECHO LA VOL/BSA BIPLANE: 19 ML/M2 (ref 16–34)
ECHO LA VOLUME INDEX MOD A2C: 19 ML/M2 (ref 16–34)
ECHO LA VOLUME INDEX MOD A4C: 17 ML/M2 (ref 16–34)
ECHO LV E' LATERAL VELOCITY: 9.14 CM/S
ECHO LV E' SEPTAL VELOCITY: 8.16 CM/S
ECHO LV EDV A2C: 62 ML
ECHO LV EDV A4C: 62 ML
ECHO LV EDV INDEX A4C: 40 ML/M2
ECHO LV EDV NDEX A2C: 40 ML/M2
ECHO LV EF PHYSICIAN: 60 %
ECHO LV EJECTION FRACTION A2C: 61 %
ECHO LV EJECTION FRACTION A4C: 57 %
ECHO LV EJECTION FRACTION BIPLANE: 60 % (ref 55–100)
ECHO LV ESV A2C: 24 ML
ECHO LV ESV A4C: 27 ML
ECHO LV ESV INDEX A2C: 15 ML/M2
ECHO LV ESV INDEX A4C: 17 ML/M2
ECHO LV FRACTIONAL SHORTENING: 30 % (ref 28–44)
ECHO LV INTERNAL DIMENSION DIASTOLE INDEX: 3.03 CM/M2
ECHO LV INTERNAL DIMENSION DIASTOLIC: 4.7 CM (ref 3.9–5.3)
ECHO LV INTERNAL DIMENSION SYSTOLIC INDEX: 2.13 CM/M2
ECHO LV INTERNAL DIMENSION SYSTOLIC: 3.3 CM
ECHO LV IVSD: 0.8 CM (ref 0.6–0.9)
ECHO LV MASS 2D: 122.3 G (ref 67–162)
ECHO LV MASS INDEX 2D: 78.9 G/M2 (ref 43–95)
ECHO LV POSTERIOR WALL DIASTOLIC: 0.8 CM (ref 0.6–0.9)
ECHO LV RELATIVE WALL THICKNESS RATIO: 0.34
ECHO LVOT AREA: 2.5 CM2
ECHO LVOT AV VTI INDEX: 0.73
ECHO LVOT DIAM: 1.8 CM
ECHO LVOT MEAN GRADIENT: 2 MMHG
ECHO LVOT PEAK GRADIENT: 4 MMHG
ECHO LVOT PEAK VELOCITY: 1 M/S
ECHO LVOT STROKE VOLUME INDEX: 39.7 ML/M2
ECHO LVOT SV: 61.6 ML
ECHO LVOT VTI: 24.2 CM
ECHO MV A VELOCITY: 0.66 M/S
ECHO MV AREA VTI: 1.8 CM2
ECHO MV E DECELERATION TIME (DT): 187 MS
ECHO MV E VELOCITY: 1.01 M/S
ECHO MV E/A RATIO: 1.53
ECHO MV E/E' LATERAL: 11.05
ECHO MV E/E' RATIO (AVERAGED): 11.71
ECHO MV E/E' SEPTAL: 12.38
ECHO MV LVOT VTI INDEX: 1.43
ECHO MV MAX VELOCITY: 1.1 M/S
ECHO MV MEAN GRADIENT: 2 MMHG
ECHO MV MEAN VELOCITY: 0.6 M/S
ECHO MV PEAK GRADIENT: 5 MMHG
ECHO MV VTI: 34.6 CM
ECHO RA AREA 4C: 11.1 CM2
ECHO RA END SYSTOLIC VOLUME APICAL 4 CHAMBER INDEX BSA: 17 ML/M2
ECHO RA VOLUME: 26 ML
ECHO RIGHT VENTRICULAR SYSTOLIC PRESSURE (RVSP): 31 MMHG
ECHO RV BASAL DIMENSION: 3.1 CM
ECHO RV TAPSE: 1.8 CM (ref 1.7–?)
ECHO TV REGURGITANT MAX VELOCITY: 2.66 M/S
ECHO TV REGURGITANT PEAK GRADIENT: 28 MMHG

## 2025-02-18 PROCEDURE — 93970 EXTREMITY STUDY: CPT

## 2025-02-18 PROCEDURE — 93306 TTE W/DOPPLER COMPLETE: CPT

## 2025-02-18 PROCEDURE — 93306 TTE W/DOPPLER COMPLETE: CPT | Performed by: INTERNAL MEDICINE

## 2025-03-05 ENCOUNTER — OFFICE VISIT (OUTPATIENT)
Dept: OBGYN CLINIC | Age: 60
End: 2025-03-05
Payer: MEDICARE

## 2025-03-05 ENCOUNTER — HOSPITAL ENCOUNTER (OUTPATIENT)
Age: 60
Setting detail: SPECIMEN
Discharge: HOME | End: 2025-03-05

## 2025-03-05 VITALS
HEIGHT: 61 IN | DIASTOLIC BLOOD PRESSURE: 78 MMHG | HEART RATE: 95 BPM | SYSTOLIC BLOOD PRESSURE: 120 MMHG | BODY MASS INDEX: 23.79 KG/M2 | WEIGHT: 126 LBS

## 2025-03-05 DIAGNOSIS — A60.00 GENITAL HERPES SIMPLEX, UNSPECIFIED SITE: ICD-10-CM

## 2025-03-05 DIAGNOSIS — Z90.710 STATUS POST HYSTERECTOMY: ICD-10-CM

## 2025-03-05 DIAGNOSIS — Z01.419 WELL WOMAN EXAM: Primary | ICD-10-CM

## 2025-03-05 DIAGNOSIS — Z11.51 SCREENING FOR HPV (HUMAN PAPILLOMAVIRUS): ICD-10-CM

## 2025-03-05 DIAGNOSIS — R30.0 BURNING WITH URINATION: ICD-10-CM

## 2025-03-05 DIAGNOSIS — Z12.31 SCREENING MAMMOGRAM FOR BREAST CANCER: ICD-10-CM

## 2025-03-05 PROCEDURE — G8420 CALC BMI NORM PARAMETERS: HCPCS | Performed by: CLINICAL NURSE SPECIALIST

## 2025-03-05 PROCEDURE — G0101 CA SCREEN;PELVIC/BREAST EXAM: HCPCS | Performed by: CLINICAL NURSE SPECIALIST

## 2025-03-05 PROCEDURE — G8427 DOCREV CUR MEDS BY ELIG CLIN: HCPCS | Performed by: CLINICAL NURSE SPECIALIST

## 2025-03-05 RX ORDER — VALACYCLOVIR HYDROCHLORIDE 500 MG/1
500 TABLET, FILM COATED ORAL 2 TIMES DAILY
Qty: 30 TABLET | Refills: 11 | Status: SHIPPED | OUTPATIENT
Start: 2025-03-05

## 2025-03-05 RX ORDER — ESTRADIOL 0.1 MG/G
1 CREAM VAGINAL
Qty: 42.5 G | Refills: 2 | Status: SHIPPED | OUTPATIENT
Start: 2025-03-06

## 2025-03-05 SDOH — ECONOMIC STABILITY: FOOD INSECURITY: WITHIN THE PAST 12 MONTHS, YOU WORRIED THAT YOUR FOOD WOULD RUN OUT BEFORE YOU GOT MONEY TO BUY MORE.: NEVER TRUE

## 2025-03-05 SDOH — ECONOMIC STABILITY: FOOD INSECURITY: WITHIN THE PAST 12 MONTHS, THE FOOD YOU BOUGHT JUST DIDN'T LAST AND YOU DIDN'T HAVE MONEY TO GET MORE.: NEVER TRUE

## 2025-03-05 ASSESSMENT — PATIENT HEALTH QUESTIONNAIRE - PHQ9
SUM OF ALL RESPONSES TO PHQ QUESTIONS 1-9: 0
SUM OF ALL RESPONSES TO PHQ QUESTIONS 1-9: 0
1. LITTLE INTEREST OR PLEASURE IN DOING THINGS: NOT AT ALL
SUM OF ALL RESPONSES TO PHQ QUESTIONS 1-9: 0
SUM OF ALL RESPONSES TO PHQ QUESTIONS 1-9: 0
2. FEELING DOWN, DEPRESSED OR HOPELESS: NOT AT ALL

## 2025-03-06 LAB
MICROORGANISM SPEC CULT: ABNORMAL
SERVICE CMNT-IMP: ABNORMAL
SPECIMEN DESCRIPTION: ABNORMAL

## 2025-03-06 RX ORDER — SULFAMETHOXAZOLE AND TRIMETHOPRIM 800; 160 MG/1; MG/1
1 TABLET ORAL 2 TIMES DAILY
Qty: 20 TABLET | Refills: 0 | Status: SHIPPED | OUTPATIENT
Start: 2025-03-06 | End: 2025-03-16

## 2025-03-07 ENCOUNTER — TELEPHONE (OUTPATIENT)
Dept: OBGYN CLINIC | Age: 60
End: 2025-03-07

## 2025-03-07 LAB
HPV I/H RISK 4 DNA CVX QL NAA+PROBE: NOT DETECTED
HPV SAMPLE: NORMAL
HPV, INTERPRETATION: NORMAL
HPV16 DNA CVX QL NAA+PROBE: NOT DETECTED
HPV18 DNA CVX QL NAA+PROBE: NOT DETECTED
SPECIMEN DESCRIPTION: NORMAL

## 2025-03-07 NOTE — TELEPHONE ENCOUNTER
----- Message from MARNI Salcido - CNP sent at 3/6/2025  9:01 PM EST -----  I sent bactrim for strep in urine

## 2025-03-10 ENCOUNTER — RESULTS FOLLOW-UP (OUTPATIENT)
Dept: OBGYN CLINIC | Age: 60
End: 2025-03-10

## 2025-03-12 LAB — CYTOLOGY REPORT: NORMAL

## 2025-05-06 ENCOUNTER — TELEPHONE (OUTPATIENT)
Dept: GASTROENTEROLOGY | Age: 60
End: 2025-05-06

## 2025-05-06 NOTE — TELEPHONE ENCOUNTER
Called pt back LVM, not clear on what Pt was calling about. No answer LVM for Pt to call the office.    Thank You.

## 2025-05-14 RX ORDER — ESTRADIOL 0.1 MG/G
1 CREAM VAGINAL
Qty: 42.5 G | Refills: 2 | Status: SHIPPED | OUTPATIENT
Start: 2025-05-15

## 2025-05-23 ENCOUNTER — TELEPHONE (OUTPATIENT)
Dept: GASTROENTEROLOGY | Age: 60
End: 2025-05-23

## 2025-05-23 NOTE — TELEPHONE ENCOUNTER
Pt returned phone call and said she had a missed call. Writer asked Jackie about her f/u appt scheduled with Dr. Prakash. Pt previously saw Dr. HENRI Muniz. Pt said she has no idea why she is scheduled with Dr. Prakash because Dr. Guido Muniz is her doctor and she doesn't want to change providers. Pt was actually calling for her fiance when writer caught the scheduling error. Per pt, OK to cancel appt scheduled with Dr. Prakash and r/s appt with Dr. Muniz at [place] location.

## 2025-05-23 NOTE — TELEPHONE ENCOUNTER
Pt left Gibberin message on 05/23 at 12:53 pm. Pt is requesting a call back.    Writer returned pt's phone call. No answer. Call rang and rang, then went to \"were sorry your call cannot be completed as dialed, please hang up and try your call again...\"

## 2025-06-26 RX ORDER — ESTRADIOL 0.1 MG/G
CREAM VAGINAL
Qty: 42.5 G | Refills: 2 | OUTPATIENT
Start: 2025-06-26

## 2025-06-30 ENCOUNTER — INITIAL CONSULT (OUTPATIENT)
Dept: VASCULAR SURGERY | Age: 60
End: 2025-06-30
Payer: MEDICARE

## 2025-06-30 VITALS
DIASTOLIC BLOOD PRESSURE: 65 MMHG | WEIGHT: 135 LBS | HEART RATE: 65 BPM | OXYGEN SATURATION: 96 % | SYSTOLIC BLOOD PRESSURE: 96 MMHG | BODY MASS INDEX: 25.49 KG/M2 | HEIGHT: 61 IN

## 2025-06-30 DIAGNOSIS — I87.393 VENOUS HYPERTENSION, CHRONIC, WITH COMPLICATION, BILATERAL: ICD-10-CM

## 2025-06-30 DIAGNOSIS — L30.9 DERMATITIS: Primary | ICD-10-CM

## 2025-06-30 PROCEDURE — 3017F COLORECTAL CA SCREEN DOC REV: CPT | Performed by: SURGERY

## 2025-06-30 PROCEDURE — 99203 OFFICE O/P NEW LOW 30 MIN: CPT | Performed by: SURGERY

## 2025-06-30 PROCEDURE — G8419 CALC BMI OUT NRM PARAM NOF/U: HCPCS | Performed by: SURGERY

## 2025-06-30 PROCEDURE — 1036F TOBACCO NON-USER: CPT | Performed by: SURGERY

## 2025-06-30 PROCEDURE — G8427 DOCREV CUR MEDS BY ELIG CLIN: HCPCS | Performed by: SURGERY

## 2025-06-30 NOTE — PROGRESS NOTES
[facility] HEART AND VASCULAR  [address] [facility] [address]  Dept: [phone]     Patient: Jackie Bill  : 1965  MRN: [id number]  DOS: 2025    Referring provider:  Olga Villa APRN *         HPI:  Jackie Bill is a 59 y.o. female who comes to the office for the first time regarding some punctate ulcerations with surrounding erythema on the left lower extremity.  She has had multiple operations of the left lower extremity including knee and foot procedures.  She now has a small 1 to 2 mm punctate sores which are extremely \"itchy\".  She continues to scratch while in my office encounter.  She explains that she has varicosities as well.  The right lower extremity is uninvolved.  She denies claudication rest pain or ulceration.  She has no significant edema.  Past Medical History:   Diagnosis Date    Anxiety     Anxiety     Asthma     Cerebral artery occlusion with cerebral infarction (HCC)     Cervical pain (neck)     Depression     Depression     Epilepsy (HCC)     History of blood transfusion     History of herpes genitalis 4/10/2007    see lab work scanned    Hypertension     Hypothyroidism     Insomnia     Internal hemorrhoids     Irritable bowel syndrome     Lupus     Menarche @ 15 y/o    MVP (mitral valve prolapse)     Parity     G 4 P 3  -  3 c-sections,1 ectopic    Rheumatoid arthritis (HCC)     Seizures (HCC) 2016    Tubulovillous adenoma     Vitamin D deficiency      Family History   Problem Relation Age of Onset    High Blood Pressure Other     Heart Disease Other     Emphysema Other     COPD Other     Cancer Other         colon    Heart Disease Father     Hypertension Father     Cancer Father     Cancer Mother     Cancer Paternal Uncle     Cancer Maternal Grandmother     Cancer Maternal Grandfather       Social History     Socioeconomic History    Marital status: Single     Spouse name: Not on file

## 2025-07-03 ENCOUNTER — HOSPITAL ENCOUNTER (OUTPATIENT)
Dept: VASCULAR LAB | Age: 60
Discharge: HOME | End: 2025-07-05
Attending: SURGERY
Payer: MEDICARE

## 2025-07-03 DIAGNOSIS — L30.9 DERMATITIS: ICD-10-CM

## 2025-07-03 DIAGNOSIS — I87.393 VENOUS HYPERTENSION, CHRONIC, WITH COMPLICATION, BILATERAL: ICD-10-CM

## 2025-07-03 PROCEDURE — 93970 EXTREMITY STUDY: CPT

## 2025-07-04 LAB
VAS LEFT CFV DIAM: 11.7 MM
VAS LEFT CFV RFX: 0.4 S
VAS LEFT FV DIST DIAM: 4 MM
VAS LEFT FV MID DIAM: 4.9 MM
VAS LEFT FV MID RFX: 0.3 S
VAS LEFT FV PROX DIAM: 5.9 MM
VAS LEFT GSV AT KNEE DIAM: 1.7 MM
VAS LEFT GSV AT KNEE RFX: 0.3 S
VAS LEFT GSV BK MID DIAM: 1.4 MM
VAS LEFT GSV BK MID RFX: 0.2 S
VAS LEFT GSV JUNC DIAM: 7.7 MM
VAS LEFT GSV JUNC RFX: 0.3 S
VAS LEFT GSV THIGH MID DIAM: 1.6 MM
VAS LEFT GSV THIGH PROX DIAM: 2.8 MM
VAS LEFT GSV THIGH PROX RFX: 0.2 S
VAS LEFT GSV THIGHT MID RFX: 0.3 S
VAS LEFT PERFORATOR RFX: 0.4 S
VAS LEFT POP RFX: 0.3 S
VAS LEFT POPLITEAL VEIN DIAM: 6.3 MM
VAS LEFT SSV MID DIAM: 1.8 MM
VAS LEFT SSV MID RFX: 0.2 S
VAS LEFT SSV PROX DIAM: 1.5 MM
VAS LEFT SSV PROX RFX: 0.3 S
VAS RIGHT CFV DIAM: 11 MM
VAS RIGHT CFV RFX: 0.2 S
VAS RIGHT FV DIST DIAM: 6.9 MM
VAS RIGHT FV MID DIAM: 4.6 MM
VAS RIGHT FV MID RFX: 0.2 S
VAS RIGHT FV PROX DIAM: 6.6 MM
VAS RIGHT GSV AK RFX: 0.2 S
VAS RIGHT GSV AT KNEE DIAM: 1.8 MM
VAS RIGHT GSV BK DIST DIAM: 1.3 MM
VAS RIGHT GSV BK DIST RFX: 0.1 S
VAS RIGHT GSV BK MID DIAM: 1.5 MM
VAS RIGHT GSV BK MID RFX: 0.1 S
VAS RIGHT GSV JUNC DIAM: 4.8 MM
VAS RIGHT GSV JUNC RFX: 0.2 S
VAS RIGHT GSV THIGH MID DIAM: 2.2 MM
VAS RIGHT GSV THIGH MID RFX: 0.2 S
VAS RIGHT GSV THIGH PROX DIAM: 2.2 MM
VAS RIGHT GSV THIGH PROX RFX: 0.1 S
VAS RIGHT POP RFX: 0.2 S
VAS RIGHT POPLITEAL VEIN DIAM: 6.5 MM
VAS RIGHT SSV MID DIAM: 1.9 MM
VAS RIGHT SSV MID RFX: 0.2 S
VAS RIGHT SSV PROX DIAM: 1.8 MM
VAS RIGHT SSV PROX RFX: 0.1 S

## 2025-07-04 PROCEDURE — 93970 EXTREMITY STUDY: CPT | Performed by: STUDENT IN AN ORGANIZED HEALTH CARE EDUCATION/TRAINING PROGRAM

## (undated) DEVICE — GAUZE,SPONGE,4"X4",16PLY,STRL,LF,10/TRAY: Brand: MEDLINE

## (undated) DEVICE — YANKAUER,FLEXIBLE HANDLE,REGLR CAPACITY: Brand: MEDLINE INDUSTRIES, INC.

## (undated) DEVICE — SYRINGE MED 50ML LUERLOCK TIP

## (undated) DEVICE — BASIN EMSIS 700ML GRAPHITE PLAS KID SHP GRAD

## (undated) DEVICE — JELLY,LUBE,STERILE,FLIP TOP,TUBE,2-OZ: Brand: MEDLINE

## (undated) DEVICE — BLOCK BITE 60FR RUBBER ADLT DENTAL

## (undated) DEVICE — MEDICINE CUP, GRADUATED, STER: Brand: MEDLINE

## (undated) DEVICE — ADAPTER TBNG LUER STUB 15 GA INTMED

## (undated) DEVICE — GOWN,AURORA,NONREINFORCED,LARGE: Brand: MEDLINE

## (undated) DEVICE — CO2 CANNULA,SUPERSOFT, ADLT,7'O2,7'CO2: Brand: MEDLINE

## (undated) DEVICE — GLOVE SURG 8 11.7IN BEAD CUF LIGHT BRN SENSICARE LTX FREE

## (undated) DEVICE — Device: Brand: DEFENDO VALVE AND CONNECTOR KIT

## (undated) DEVICE — TUBING, SUCTION, 1/4" X 12', STRAIGHT: Brand: MEDLINE

## (undated) DEVICE — ELECTRODE PT RET AD L9FT HI MOIST COND ADH HYDRGEL CORDED

## (undated) DEVICE — SINGLE-USE BIOPSY FORCEPS: Brand: RADIAL JAW 4

## (undated) DEVICE — CUP MED 1OZ CLR POLYPR FEED GRAD W/O LID

## (undated) DEVICE — GOWN POLY REINF SONT XLG: Brand: MEDLINE INDUSTRIES, INC.